# Patient Record
Sex: FEMALE | Race: WHITE | NOT HISPANIC OR LATINO | Employment: FULL TIME | URBAN - METROPOLITAN AREA
[De-identification: names, ages, dates, MRNs, and addresses within clinical notes are randomized per-mention and may not be internally consistent; named-entity substitution may affect disease eponyms.]

---

## 2019-04-15 ENCOUNTER — APPOINTMENT (OUTPATIENT)
Dept: PREADMISSION TESTING | Facility: HOSPITAL | Age: 59
End: 2019-04-15

## 2019-04-15 VITALS
BODY MASS INDEX: 34.98 KG/M2 | RESPIRATION RATE: 16 BRPM | OXYGEN SATURATION: 96 % | HEIGHT: 61 IN | TEMPERATURE: 97.8 F | WEIGHT: 185.25 LBS | HEART RATE: 74 BPM | SYSTOLIC BLOOD PRESSURE: 151 MMHG | DIASTOLIC BLOOD PRESSURE: 95 MMHG

## 2019-04-15 LAB
ALBUMIN SERPL-MCNC: 4.4 G/DL (ref 3.5–5.2)
ALBUMIN/GLOB SERPL: 1.6 G/DL
ALP SERPL-CCNC: 67 U/L (ref 39–117)
ALT SERPL W P-5'-P-CCNC: 11 U/L (ref 1–33)
ANION GAP SERPL CALCULATED.3IONS-SCNC: 13.1 MMOL/L
AST SERPL-CCNC: 10 U/L (ref 1–32)
BILIRUB SERPL-MCNC: 0.3 MG/DL (ref 0.2–1.2)
BUN BLD-MCNC: 11 MG/DL (ref 8–23)
BUN/CREAT SERPL: 15.5 (ref 7–25)
CALCIUM SPEC-SCNC: 9.4 MG/DL (ref 8.6–10.5)
CHLORIDE SERPL-SCNC: 102 MMOL/L (ref 98–107)
CO2 SERPL-SCNC: 27.9 MMOL/L (ref 22–29)
CREAT BLD-MCNC: 0.71 MG/DL (ref 0.57–1)
DEPRECATED RDW RBC AUTO: 38.9 FL (ref 37–54)
ERYTHROCYTE [DISTWIDTH] IN BLOOD BY AUTOMATED COUNT: 12.1 % (ref 12.3–15.4)
GFR SERPL CREATININE-BSD FRML MDRD: 84 ML/MIN/1.73
GLOBULIN UR ELPH-MCNC: 2.7 GM/DL
GLUCOSE BLD-MCNC: 110 MG/DL (ref 65–99)
HCT VFR BLD AUTO: 43.8 % (ref 34–46.6)
HGB BLD-MCNC: 14.7 G/DL (ref 12–15.9)
MCH RBC QN AUTO: 29.6 PG (ref 26.6–33)
MCHC RBC AUTO-ENTMCNC: 33.6 G/DL (ref 31.5–35.7)
MCV RBC AUTO: 88.1 FL (ref 79–97)
PLATELET # BLD AUTO: 195 10*3/MM3 (ref 140–450)
PMV BLD AUTO: 11.8 FL (ref 6–12)
POTASSIUM BLD-SCNC: 3.9 MMOL/L (ref 3.5–5.2)
PROT SERPL-MCNC: 7.1 G/DL (ref 6–8.5)
RBC # BLD AUTO: 4.97 10*6/MM3 (ref 3.77–5.28)
SODIUM BLD-SCNC: 143 MMOL/L (ref 136–145)
WBC NRBC COR # BLD: 7.91 10*3/MM3 (ref 3.4–10.8)

## 2019-04-15 PROCEDURE — 93010 ELECTROCARDIOGRAM REPORT: CPT | Performed by: INTERNAL MEDICINE

## 2019-04-15 PROCEDURE — 93005 ELECTROCARDIOGRAM TRACING: CPT

## 2019-04-15 PROCEDURE — 80053 COMPREHEN METABOLIC PANEL: CPT | Performed by: ORTHOPAEDIC SURGERY

## 2019-04-15 PROCEDURE — 85027 COMPLETE CBC AUTOMATED: CPT | Performed by: ORTHOPAEDIC SURGERY

## 2019-04-15 PROCEDURE — 36415 COLL VENOUS BLD VENIPUNCTURE: CPT

## 2019-04-15 RX ORDER — MELOXICAM 15 MG/1
15 TABLET ORAL EVERY MORNING
COMMUNITY

## 2019-04-15 RX ORDER — BENAZEPRIL HYDROCHLORIDE 40 MG/1
40 TABLET, FILM COATED ORAL EVERY MORNING
COMMUNITY

## 2019-04-15 RX ORDER — ACETAMINOPHEN 500 MG
500 TABLET ORAL EVERY 6 HOURS PRN
COMMUNITY

## 2019-04-15 NOTE — DISCHARGE INSTRUCTIONS
Take the following medications the morning of surgery with a small sip of water:    LOTENSIN    General Instructions:  • Do not eat solid food after midnight the night before surgery.  • You may drink clear liquids day of surgery but must stop at least one hour before your hospital arrival time.  • It is beneficial for you to have a clear drink that contains carbohydrates the day of surgery.  We suggest a 12 to 20 ounce bottle of Gatorade or Powerade for non-diabetic patients     Clear liquids are liquids you can see through.  Nothing red in color.     Plain water                               Sports drinks  Sodas                                   Gelatin (Jell-O)  Fruit juices without pulp such as white grape juice and apple juice  Popsicles that contain no fruit or yogurt  Tea or coffee (no cream or milk added)  Gatorade / Powerade  G2 / Powerade Zero    • Bring any papers given to you in the doctor’s office.  • Wear clean comfortable clothes and socks.  • Do not wear contact lenses, false eyelashes or make-up.  Bring a case for your glasses.   • Bring crutches or walker if applicable.  • Remove all piercings.  Leave jewelry and any other valuables at home.  • Hair extensions with metal clips must be removed prior to surgery.  • The Pre-Admission Testing nurse will instruct you to bring medications if unable to obtain an accurate list in Pre-Admission Testing.  • REPORT TO OUTPATIENT SURGERY ON 4-17-19          Preventing a Surgical Site Infection:  • For 2 to 3 days before surgery, avoid shaving with a razor because the razor can irritate skin and make it easier to develop an infection.    • Any areas of open skin can increase the risk of a post-operative wound infection by allowing bacteria to enter and travel throughout the body.  Notify your surgeon if you have any skin wounds / rashes even if it is not near the expected surgical site.  The area will need assessed to determine if surgery should be delayed  until it is healed.  • The night prior to surgery sleep in a clean bed with clean clothing.  Do not allow pets to sleep with you.  • Shower on the morning of surgery using a fresh bar of anti-bacterial soap (such as Dial) and clean washcloth.  Dry with a clean towel and dress in clean clothing.  • Ask your surgeon if you will be receiving antibiotics prior to surgery.  • Make sure you, your family, and all healthcare providers clean their hands with soap and water or an alcohol based hand  before caring for you or your wound.    Day of surgery:  Upon arrival, a Pre-op nurse and Anesthesiologist will review your health history, obtain vital signs, and answer questions you may have.  The only belongings needed at this time will be your home medications and if applicable your C-PAP/BI-PAP machine.  If you are staying overnight your family can leave the rest of your belongings in the car and bring them to your room later.  A Pre-op nurse will start an IV and you may receive medication in preparation for surgery, including something to help you relax.  Your family will be able to see you in the Pre-op area.  While you are in surgery your family should notify the waiting room  if they leave the waiting room area and provide a contact phone number.    Please be aware that surgery does come with discomfort.  We want to make every effort to control your discomfort so please discuss any uncontrolled symptoms with your nurse.   Your doctor will most likely have prescribed pain medications.      If you are going home after surgery you will receive individualized written care instructions before being discharged.  A responsible adult must drive you to and from the hospital on the day of your surgery and stay with you for 24 hours.        You have received a list of surgical assistants for your reference.  If you have any questions please call Pre-Admission Testing at [phone].  Deductibles and co-payments  are collected on the day of service. Please be prepared to pay the required co-pay, deductible or deposit on the day of service as defined by your plan.

## 2019-04-17 ENCOUNTER — APPOINTMENT (OUTPATIENT)
Dept: GENERAL RADIOLOGY | Facility: HOSPITAL | Age: 59
End: 2019-04-17

## 2019-04-17 ENCOUNTER — ANESTHESIA (OUTPATIENT)
Dept: PERIOP | Facility: HOSPITAL | Age: 59
End: 2019-04-17

## 2019-04-17 ENCOUNTER — ANESTHESIA EVENT (OUTPATIENT)
Dept: PERIOP | Facility: HOSPITAL | Age: 59
End: 2019-04-17

## 2019-04-17 ENCOUNTER — HOSPITAL ENCOUNTER (OUTPATIENT)
Facility: HOSPITAL | Age: 59
Setting detail: HOSPITAL OUTPATIENT SURGERY
Discharge: HOME | End: 2019-04-17
Attending: ORTHOPAEDIC SURGERY | Admitting: ORTHOPAEDIC SURGERY

## 2019-04-17 VITALS
RESPIRATION RATE: 16 BRPM | DIASTOLIC BLOOD PRESSURE: 75 MMHG | TEMPERATURE: 98 F | OXYGEN SATURATION: 94 % | HEART RATE: 81 BPM | SYSTOLIC BLOOD PRESSURE: 114 MMHG

## 2019-04-17 PROCEDURE — 25010000002 DEXAMETHASONE PER 1 MG: Performed by: NURSE ANESTHETIST, CERTIFIED REGISTERED

## 2019-04-17 PROCEDURE — 25010000002 FENTANYL CITRATE (PF) 100 MCG/2ML SOLUTION: Performed by: NURSE ANESTHETIST, CERTIFIED REGISTERED

## 2019-04-17 PROCEDURE — 76000 FLUOROSCOPY <1 HR PHYS/QHP: CPT

## 2019-04-17 PROCEDURE — 25010000002 ONDANSETRON PER 1 MG: Performed by: NURSE ANESTHETIST, CERTIFIED REGISTERED

## 2019-04-17 PROCEDURE — 25010000002 PROPOFOL 10 MG/ML EMULSION: Performed by: NURSE ANESTHETIST, CERTIFIED REGISTERED

## 2019-04-17 PROCEDURE — C1713 ANCHOR/SCREW BN/BN,TIS/BN: HCPCS | Performed by: ORTHOPAEDIC SURGERY

## 2019-04-17 PROCEDURE — 25010000002 MIDAZOLAM PER 1 MG: Performed by: ANESTHESIOLOGY

## 2019-04-17 PROCEDURE — 25010000002 FENTANYL CITRATE (PF) 100 MCG/2ML SOLUTION: Performed by: ANESTHESIOLOGY

## 2019-04-17 PROCEDURE — 73560 X-RAY EXAM OF KNEE 1 OR 2: CPT

## 2019-04-17 PROCEDURE — 25010000002 FENTANYL CITRATE (PF) 100 MCG/2ML SOLUTION

## 2019-04-17 PROCEDURE — 25010000003 CEFAZOLIN IN DEXTROSE 2-4 GM/100ML-% SOLUTION: Performed by: ORTHOPAEDIC SURGERY

## 2019-04-17 PROCEDURE — 25010000002 KETOROLAC TROMETHAMINE PER 15 MG: Performed by: NURSE ANESTHETIST, CERTIFIED REGISTERED

## 2019-04-17 DEVICE — CMT BONE 5CC: Type: IMPLANTABLE DEVICE | Site: KNEE | Status: FUNCTIONAL

## 2019-04-17 RX ORDER — PROMETHAZINE HYDROCHLORIDE 25 MG/ML
6.25 INJECTION, SOLUTION INTRAMUSCULAR; INTRAVENOUS ONCE AS NEEDED
Status: DISCONTINUED | OUTPATIENT
Start: 2019-04-17 | End: 2019-04-17 | Stop reason: HOSPADM

## 2019-04-17 RX ORDER — EPHEDRINE SULFATE 50 MG/ML
5 INJECTION, SOLUTION INTRAVENOUS ONCE AS NEEDED
Status: DISCONTINUED | OUTPATIENT
Start: 2019-04-17 | End: 2019-04-17 | Stop reason: HOSPADM

## 2019-04-17 RX ORDER — KETOROLAC TROMETHAMINE 30 MG/ML
INJECTION, SOLUTION INTRAMUSCULAR; INTRAVENOUS AS NEEDED
Status: DISCONTINUED | OUTPATIENT
Start: 2019-04-17 | End: 2019-04-17 | Stop reason: SURG

## 2019-04-17 RX ORDER — PROMETHAZINE HYDROCHLORIDE 25 MG/1
25 SUPPOSITORY RECTAL ONCE AS NEEDED
Status: DISCONTINUED | OUTPATIENT
Start: 2019-04-17 | End: 2019-04-17 | Stop reason: HOSPADM

## 2019-04-17 RX ORDER — FENTANYL CITRATE 50 UG/ML
INJECTION, SOLUTION INTRAMUSCULAR; INTRAVENOUS
Status: COMPLETED
Start: 2019-04-17 | End: 2019-04-17

## 2019-04-17 RX ORDER — HYDROCODONE BITARTRATE AND ACETAMINOPHEN 7.5; 325 MG/1; MG/1
TABLET ORAL
Status: COMPLETED
Start: 2019-04-17 | End: 2019-04-17

## 2019-04-17 RX ORDER — DEXAMETHASONE SODIUM PHOSPHATE 10 MG/ML
INJECTION INTRAMUSCULAR; INTRAVENOUS AS NEEDED
Status: DISCONTINUED | OUTPATIENT
Start: 2019-04-17 | End: 2019-04-17 | Stop reason: SURG

## 2019-04-17 RX ORDER — PROMETHAZINE HYDROCHLORIDE 25 MG/1
25 TABLET ORAL ONCE AS NEEDED
Status: DISCONTINUED | OUTPATIENT
Start: 2019-04-17 | End: 2019-04-17 | Stop reason: HOSPADM

## 2019-04-17 RX ORDER — SODIUM CHLORIDE 0.9 % (FLUSH) 0.9 %
1-10 SYRINGE (ML) INJECTION AS NEEDED
Status: DISCONTINUED | OUTPATIENT
Start: 2019-04-17 | End: 2019-04-17 | Stop reason: HOSPADM

## 2019-04-17 RX ORDER — PROPOFOL 10 MG/ML
VIAL (ML) INTRAVENOUS AS NEEDED
Status: DISCONTINUED | OUTPATIENT
Start: 2019-04-17 | End: 2019-04-17 | Stop reason: SURG

## 2019-04-17 RX ORDER — SODIUM CHLORIDE, SODIUM LACTATE, POTASSIUM CHLORIDE, CALCIUM CHLORIDE 600; 310; 30; 20 MG/100ML; MG/100ML; MG/100ML; MG/100ML
9 INJECTION, SOLUTION INTRAVENOUS CONTINUOUS
Status: DISCONTINUED | OUTPATIENT
Start: 2019-04-17 | End: 2019-04-17 | Stop reason: HOSPADM

## 2019-04-17 RX ORDER — LIDOCAINE HYDROCHLORIDE 10 MG/ML
0.5 INJECTION, SOLUTION EPIDURAL; INFILTRATION; INTRACAUDAL; PERINEURAL ONCE AS NEEDED
Status: COMPLETED | OUTPATIENT
Start: 2019-04-17 | End: 2019-04-17

## 2019-04-17 RX ORDER — LIDOCAINE HYDROCHLORIDE 20 MG/ML
INJECTION, SOLUTION INFILTRATION; PERINEURAL AS NEEDED
Status: DISCONTINUED | OUTPATIENT
Start: 2019-04-17 | End: 2019-04-17 | Stop reason: SURG

## 2019-04-17 RX ORDER — SODIUM CHLORIDE, SODIUM LACTATE, POTASSIUM CHLORIDE, AND CALCIUM CHLORIDE .6; .31; .03; .02 G/100ML; G/100ML; G/100ML; G/100ML
IRRIGANT IRRIGATION AS NEEDED
Status: DISCONTINUED | OUTPATIENT
Start: 2019-04-17 | End: 2019-04-17 | Stop reason: HOSPADM

## 2019-04-17 RX ORDER — HYDROMORPHONE HYDROCHLORIDE 1 MG/ML
0.5 INJECTION, SOLUTION INTRAMUSCULAR; INTRAVENOUS; SUBCUTANEOUS
Status: DISCONTINUED | OUTPATIENT
Start: 2019-04-17 | End: 2019-04-17 | Stop reason: HOSPADM

## 2019-04-17 RX ORDER — ONDANSETRON 2 MG/ML
INJECTION INTRAMUSCULAR; INTRAVENOUS AS NEEDED
Status: DISCONTINUED | OUTPATIENT
Start: 2019-04-17 | End: 2019-04-17 | Stop reason: SURG

## 2019-04-17 RX ORDER — FAMOTIDINE 10 MG/ML
20 INJECTION, SOLUTION INTRAVENOUS ONCE
Status: COMPLETED | OUTPATIENT
Start: 2019-04-17 | End: 2019-04-17

## 2019-04-17 RX ORDER — FENTANYL CITRATE 50 UG/ML
100 INJECTION, SOLUTION INTRAMUSCULAR; INTRAVENOUS
Status: DISCONTINUED | OUTPATIENT
Start: 2019-04-17 | End: 2019-04-17 | Stop reason: HOSPADM

## 2019-04-17 RX ORDER — SODIUM CHLORIDE 0.9 % (FLUSH) 0.9 %
3 SYRINGE (ML) INJECTION EVERY 12 HOURS SCHEDULED
Status: DISCONTINUED | OUTPATIENT
Start: 2019-04-17 | End: 2019-04-17 | Stop reason: HOSPADM

## 2019-04-17 RX ORDER — ONDANSETRON 2 MG/ML
4 INJECTION INTRAMUSCULAR; INTRAVENOUS ONCE AS NEEDED
Status: DISCONTINUED | OUTPATIENT
Start: 2019-04-17 | End: 2019-04-17 | Stop reason: HOSPADM

## 2019-04-17 RX ORDER — FENTANYL CITRATE 50 UG/ML
INJECTION, SOLUTION INTRAMUSCULAR; INTRAVENOUS AS NEEDED
Status: DISCONTINUED | OUTPATIENT
Start: 2019-04-17 | End: 2019-04-17 | Stop reason: SURG

## 2019-04-17 RX ORDER — MIDAZOLAM HYDROCHLORIDE 1 MG/ML
2 INJECTION INTRAMUSCULAR; INTRAVENOUS
Status: DISCONTINUED | OUTPATIENT
Start: 2019-04-17 | End: 2019-04-17 | Stop reason: HOSPADM

## 2019-04-17 RX ORDER — HYDROCODONE BITARTRATE AND ACETAMINOPHEN 7.5; 325 MG/1; MG/1
1 TABLET ORAL ONCE AS NEEDED
Status: COMPLETED | OUTPATIENT
Start: 2019-04-17 | End: 2019-04-17

## 2019-04-17 RX ORDER — OXYCODONE AND ACETAMINOPHEN 7.5; 325 MG/1; MG/1
1 TABLET ORAL EVERY 4 HOURS PRN
Status: DISCONTINUED | OUTPATIENT
Start: 2019-04-17 | End: 2019-04-17 | Stop reason: HOSPADM

## 2019-04-17 RX ORDER — FLUMAZENIL 0.1 MG/ML
0.2 INJECTION INTRAVENOUS AS NEEDED
Status: DISCONTINUED | OUTPATIENT
Start: 2019-04-17 | End: 2019-04-17 | Stop reason: HOSPADM

## 2019-04-17 RX ORDER — CEFAZOLIN SODIUM 2 G/100ML
2 INJECTION, SOLUTION INTRAVENOUS ONCE
Status: COMPLETED | OUTPATIENT
Start: 2019-04-17 | End: 2019-04-17

## 2019-04-17 RX ORDER — MIDAZOLAM HYDROCHLORIDE 1 MG/ML
1 INJECTION INTRAMUSCULAR; INTRAVENOUS
Status: DISCONTINUED | OUTPATIENT
Start: 2019-04-17 | End: 2019-04-17 | Stop reason: HOSPADM

## 2019-04-17 RX ORDER — EPHEDRINE SULFATE 50 MG/ML
INJECTION, SOLUTION INTRAVENOUS AS NEEDED
Status: DISCONTINUED | OUTPATIENT
Start: 2019-04-17 | End: 2019-04-17 | Stop reason: SURG

## 2019-04-17 RX ORDER — FENTANYL CITRATE 50 UG/ML
50 INJECTION, SOLUTION INTRAMUSCULAR; INTRAVENOUS
Status: DISCONTINUED | OUTPATIENT
Start: 2019-04-17 | End: 2019-04-17 | Stop reason: HOSPADM

## 2019-04-17 RX ORDER — HYDROCODONE BITARTRATE AND ACETAMINOPHEN 5; 325 MG/1; MG/1
1-2 TABLET ORAL EVERY 4 HOURS PRN
Qty: 40 TABLET | Refills: 0 | Status: SHIPPED | OUTPATIENT
Start: 2019-04-17 | End: 2019-09-24

## 2019-04-17 RX ORDER — BUPIVACAINE HYDROCHLORIDE AND EPINEPHRINE 5; 5 MG/ML; UG/ML
INJECTION, SOLUTION PERINEURAL AS NEEDED
Status: DISCONTINUED | OUTPATIENT
Start: 2019-04-17 | End: 2019-04-17 | Stop reason: HOSPADM

## 2019-04-17 RX ADMIN — FENTANYL CITRATE 50 MCG: 50 INJECTION, SOLUTION INTRAMUSCULAR; INTRAVENOUS at 09:36

## 2019-04-17 RX ADMIN — LIDOCAINE HYDROCHLORIDE 0.5 ML: 10 INJECTION, SOLUTION EPIDURAL; INFILTRATION; INTRACAUDAL; PERINEURAL at 07:16

## 2019-04-17 RX ADMIN — KETOROLAC TROMETHAMINE 30 MG: 30 INJECTION, SOLUTION INTRAMUSCULAR; INTRAVENOUS at 08:40

## 2019-04-17 RX ADMIN — PROPOFOL 200 MG: 10 INJECTION, EMULSION INTRAVENOUS at 08:02

## 2019-04-17 RX ADMIN — SODIUM CHLORIDE, POTASSIUM CHLORIDE, SODIUM LACTATE AND CALCIUM CHLORIDE 9 ML/HR: 600; 310; 30; 20 INJECTION, SOLUTION INTRAVENOUS at 07:16

## 2019-04-17 RX ADMIN — MIDAZOLAM 2 MG: 1 INJECTION INTRAMUSCULAR; INTRAVENOUS at 07:29

## 2019-04-17 RX ADMIN — FAMOTIDINE 20 MG: 10 INJECTION INTRAVENOUS at 07:28

## 2019-04-17 RX ADMIN — FENTANYL CITRATE 50 MCG: 50 INJECTION, SOLUTION INTRAMUSCULAR; INTRAVENOUS at 09:31

## 2019-04-17 RX ADMIN — ONDANSETRON 4 MG: 2 INJECTION INTRAMUSCULAR; INTRAVENOUS at 08:40

## 2019-04-17 RX ADMIN — HYDROCODONE BITARTRATE AND ACETAMINOPHEN 1 TABLET: 7.5; 325 TABLET ORAL at 09:32

## 2019-04-17 RX ADMIN — FENTANYL CITRATE 50 MCG: 50 INJECTION INTRAMUSCULAR; INTRAVENOUS at 08:01

## 2019-04-17 RX ADMIN — DEXAMETHASONE SODIUM PHOSPHATE 8 MG: 10 INJECTION INTRAMUSCULAR; INTRAVENOUS at 08:10

## 2019-04-17 RX ADMIN — LIDOCAINE HYDROCHLORIDE 100 MG: 20 INJECTION, SOLUTION INFILTRATION; PERINEURAL at 08:02

## 2019-04-17 RX ADMIN — CEFAZOLIN SODIUM 2 G: 2 INJECTION, SOLUTION INTRAVENOUS at 08:10

## 2019-04-17 RX ADMIN — EPHEDRINE SULFATE 10 MG: 50 INJECTION INTRAMUSCULAR; INTRAVENOUS; SUBCUTANEOUS at 08:22

## 2019-04-17 NOTE — OP NOTE
KNEE ARTHROSCOPY  Procedure Note    Moriah Weeks  4/17/2019    Pre-op Diagnosis:   1.  Left medial and lateral meniscal tears  2.  Insufficiency fracture of left medial tibial plateau  3.     Post-op Diagnosis:     1.  Same  2.   3.     Procedure(s):  1.  Left knee arthroscopy with partial medial and lateral meniscectomy  2.  Arthroscopic chondroplasty  3.  Internal fixation of insufficiency fracture of medial tibial plateau  4.     Surgeon(s):  Aaron Fleming MD    Anesthesia: General  Anesthesiologist: Jeremiah Valles MD  CRNA: Jeana Connor CRNA    Staff:   Circulator: Beth Dupree RN; Tressa Degroot RN  Radiology Technologist: Mattingly-Epley, Alyna D.  Scrub Person: Essence Davis  Vendor Representative: Steve Chambers      Drains: None    Estimated Blood Loss: minimal    Specimen: * No orders in the log *    Description of Procedure:  I.  Following induction of anesthesia a tourniquet was placed on the left upper thigh and the leg was placed in the leg collado.  The leg was then prepped and draped in a sterile fashion.  I wrapped the leg with the elastic bandage and inflated the tourniquet to 250 mmHg pressure.  We first proceed with internal fixation of the fracture.  Using the C arm in the AP and lateral plane I located a position on the skin just overlying the medial tibial plateau.  I made a small stab incision and placed the trocar into the plateau around 2 cm distal to the joint line.  I then injected a total of 3 cc of bone cement  rotating the trocar 90 degrees between each of the 3 injections.  C-arm was used to confirm the position of the cement.  There was no extravasation.    II.  I then created the lateral arthroscopic portal and inserted the cannula into the joint.  She was found to have a very fibrotic medial plical shelf and chondromalacia of the patella.  I created the medial portal and inserted the shaver and performed extensive debridement of the plica as well as  a chondroplasty of the patella and femur.  She was also noted to have significant tearing of both the medial and lateral meniscus.  I used a small incising forcep to morselized unstable inner margin of the medial meniscus then used a shaver to vacuum out these fragments were further contoured back to stable tissue.  I then used the same biter to morselized unstable inner margin of the lateral meniscus and used a shaver to vacuum out these fragments and further contoured back to stable tissue.  We then flushed the knee and removed our cannulas and closed the portals with a 3-0 nylon.  I then injected the knee with a solution of Marcaine and morphine.  She was then placed into a soft sterile dressing.  She will be discharged home following recovery and prognosis is good.    Findings: See Dictation    Complications: None      Aaron Fleming MD     Date: 4/17/2019  Time: 8:48 AM

## 2019-04-17 NOTE — ANESTHESIA POSTPROCEDURE EVALUATION
Patient: Moriah Wekes    Procedure Summary     Date:  04/17/19 Room / Location:  [facility]  /  [facility]    Anesthesia Start:  0759 Anesthesia Stop:  0859    Procedure:  LEFT KNEE ARTHROSCOPY WITH PARTIAL MEDIAL MENISCETOMY, CHONDROPLASTY AND INTERNAL FIXATION TIBIAL PLATEAU INSUFFICIENCY FRACTURE (Left Knee) Diagnosis:      Surgeon:  Aaron Fleming MD Provider:  Jeremiah Valles MD    Anesthesia Type:  general ASA Status:  3          Anesthesia Type: general  Last vitals  BP   106/75 (04/17/19 1005)   Temp   36.7 °C (98 °F) (04/17/19 1005)   Pulse   88 (04/17/19 1005)   Resp   16 (04/17/19 1005)     SpO2   98 % (04/17/19 1005)     Post Anesthesia Care and Evaluation    Patient location during evaluation: bedside  Patient participation: complete - patient participated  Level of consciousness: awake  Pain score: 1  Pain management: adequate  Airway patency: patent  Anesthetic complications: No anesthetic complications    Cardiovascular status: acceptable  Respiratory status: acceptable  Hydration status: acceptable    Comments: -------------------------              04/17/19                    1005        -------------------------   BP:         106/75        Pulse:        88          Resp:         16          Temp:   36.7 °C (98 °F)   SpO2:         98%        -------------------------

## 2019-04-17 NOTE — ANESTHESIA PREPROCEDURE EVALUATION
Anesthesia Evaluation     Patient summary reviewed and Nursing notes reviewed   NPO Solid Status: > 8 hours             Airway   Mallampati: II  TM distance: >3 FB  Neck ROM: full  no difficulty expected  Dental - normal exam     Pulmonary - normal exam   (+) sleep apnea,   Cardiovascular - normal exam    (+) hypertension,       Neuro/Psych  (+) psychiatric history Depression,     GI/Hepatic/Renal/Endo - negative ROS     Musculoskeletal (-) negative ROS    Abdominal  - normal exam   Substance History - negative use     OB/GYN negative ob/gyn ROS         Other                        Anesthesia Plan    ASA 3     general     intravenous induction   Anesthetic plan, all risks, benefits, and alternatives have been provided, discussed and informed consent has been obtained with: patient.    Plan discussed with CRNA.

## 2019-04-17 NOTE — ANESTHESIA PROCEDURE NOTES
Airway  Urgency: elective    Airway not difficult    General Information and Staff    Patient location during procedure: OR  Anesthesiologist: Jeremiah Valles MD  CRNA: Jeana Connor CRNA    Indications and Patient Condition  Indications for airway management: airway protection    Preoxygenated: yes (Pt pre-O2 with 100% O2)  Mask difficulty assessment: 0 - not attempted    Final Airway Details  Final airway type: supraglottic airway      Successful airway: classic  Size 4    Number of attempts at approach: 1    Additional Comments  Appears ATOLMA x1. No change in dentition. + ETCO2. Airway seal pressure <20cm H2O.

## 2019-09-24 ENCOUNTER — APPOINTMENT (OUTPATIENT)
Dept: PREADMISSION TESTING | Facility: HOSPITAL | Age: 59
End: 2019-09-24

## 2019-09-24 ENCOUNTER — HOSPITAL ENCOUNTER (OUTPATIENT)
Dept: GENERAL RADIOLOGY | Facility: HOSPITAL | Age: 59
Discharge: HOME | End: 2019-09-24

## 2019-09-24 ENCOUNTER — HOSPITAL ENCOUNTER (OUTPATIENT)
Dept: GENERAL RADIOLOGY | Facility: HOSPITAL | Age: 59
Discharge: HOME | End: 2019-09-24
Admitting: ORTHOPAEDIC SURGERY

## 2019-09-24 ENCOUNTER — TRANSCRIBE ORDERS (OUTPATIENT)
Dept: ADMINISTRATIVE | Facility: HOSPITAL | Age: 59
End: 2019-09-24

## 2019-09-24 ENCOUNTER — LAB (OUTPATIENT)
Dept: LAB | Facility: HOSPITAL | Age: 59
End: 2019-09-24

## 2019-09-24 VITALS
HEART RATE: 59 BPM | RESPIRATION RATE: 20 BRPM | DIASTOLIC BLOOD PRESSURE: 89 MMHG | BODY MASS INDEX: 35.95 KG/M2 | OXYGEN SATURATION: 96 % | HEIGHT: 61 IN | SYSTOLIC BLOOD PRESSURE: 132 MMHG | WEIGHT: 190.4 LBS | TEMPERATURE: 98.1 F

## 2019-09-24 DIAGNOSIS — Z00.00 ROUTINE GENERAL MEDICAL EXAMINATION AT A HEALTH CARE FACILITY: ICD-10-CM

## 2019-09-24 DIAGNOSIS — Z00.00 ROUTINE GENERAL MEDICAL EXAMINATION AT A HEALTH CARE FACILITY: Primary | ICD-10-CM

## 2019-09-24 LAB
ALBUMIN SERPL-MCNC: 4.7 G/DL (ref 3.5–5.2)
ALBUMIN/GLOB SERPL: 1.6 G/DL
ALP SERPL-CCNC: 71 U/L (ref 39–117)
ALT SERPL W P-5'-P-CCNC: 12 U/L (ref 1–33)
ANION GAP SERPL CALCULATED.3IONS-SCNC: 11.5 MMOL/L (ref 5–15)
APTT PPP: 29.5 SECONDS (ref 22.7–35.4)
AST SERPL-CCNC: 13 U/L (ref 1–32)
BACTERIA UR QL AUTO: ABNORMAL /HPF
BASOPHILS # BLD AUTO: 0.04 10*3/MM3 (ref 0–0.2)
BASOPHILS NFR BLD AUTO: 0.4 % (ref 0–1.5)
BILIRUB SERPL-MCNC: 0.4 MG/DL (ref 0.2–1.2)
BILIRUB UR QL STRIP: NEGATIVE
BUN BLD-MCNC: 11 MG/DL (ref 8–23)
BUN/CREAT SERPL: 17.2 (ref 7–25)
CALCIUM SPEC-SCNC: 9.8 MG/DL (ref 8.6–10.5)
CHLORIDE SERPL-SCNC: 101 MMOL/L (ref 98–107)
CHOLEST SERPL-MCNC: 182 MG/DL (ref 0–200)
CLARITY UR: ABNORMAL
CO2 SERPL-SCNC: 31.5 MMOL/L (ref 22–29)
COLOR UR: YELLOW
CREAT BLD-MCNC: 0.64 MG/DL (ref 0.57–1)
DEPRECATED RDW RBC AUTO: 40.5 FL (ref 37–54)
EOSINOPHIL # BLD AUTO: 0.11 10*3/MM3 (ref 0–0.4)
EOSINOPHIL NFR BLD AUTO: 1.2 % (ref 0.3–6.2)
ERYTHROCYTE [DISTWIDTH] IN BLOOD BY AUTOMATED COUNT: 12.8 % (ref 12.3–15.4)
GFR SERPL CREATININE-BSD FRML MDRD: 95 ML/MIN/1.73
GLOBULIN UR ELPH-MCNC: 2.9 GM/DL
GLUCOSE BLD-MCNC: 99 MG/DL (ref 65–99)
GLUCOSE UR STRIP-MCNC: NEGATIVE MG/DL
HCT VFR BLD AUTO: 46.1 % (ref 34–46.6)
HDLC SERPL-MCNC: 71 MG/DL (ref 40–60)
HGB BLD-MCNC: 15.5 G/DL (ref 12–15.9)
HGB UR QL STRIP.AUTO: NEGATIVE
HYALINE CASTS UR QL AUTO: ABNORMAL /LPF
IMM GRANULOCYTES # BLD AUTO: 0.05 10*3/MM3 (ref 0–0.05)
IMM GRANULOCYTES NFR BLD AUTO: 0.5 % (ref 0–0.5)
INR PPP: 1.12 (ref 0.9–1.1)
KETONES UR QL STRIP: NEGATIVE
LDLC SERPL CALC-MCNC: 90 MG/DL (ref 0–100)
LDLC/HDLC SERPL: 1.27 {RATIO}
LEUKOCYTE ESTERASE UR QL STRIP.AUTO: ABNORMAL
LYMPHOCYTES # BLD AUTO: 1.87 10*3/MM3 (ref 0.7–3.1)
LYMPHOCYTES NFR BLD AUTO: 19.9 % (ref 19.6–45.3)
MCH RBC QN AUTO: 29.4 PG (ref 26.6–33)
MCHC RBC AUTO-ENTMCNC: 33.6 G/DL (ref 31.5–35.7)
MCV RBC AUTO: 87.3 FL (ref 79–97)
MONOCYTES # BLD AUTO: 0.4 10*3/MM3 (ref 0.1–0.9)
MONOCYTES NFR BLD AUTO: 4.3 % (ref 5–12)
NEUTROPHILS # BLD AUTO: 6.92 10*3/MM3 (ref 1.7–7)
NEUTROPHILS NFR BLD AUTO: 73.7 % (ref 42.7–76)
NITRITE UR QL STRIP: NEGATIVE
NRBC BLD AUTO-RTO: 0 /100 WBC (ref 0–0.2)
PH UR STRIP.AUTO: <=5 [PH] (ref 5–8)
PLATELET # BLD AUTO: 247 10*3/MM3 (ref 140–450)
PMV BLD AUTO: 11.7 FL (ref 6–12)
POTASSIUM BLD-SCNC: 4.1 MMOL/L (ref 3.5–5.2)
PROT SERPL-MCNC: 7.6 G/DL (ref 6–8.5)
PROT UR QL STRIP: NEGATIVE
PROTHROMBIN TIME: 14.1 SECONDS (ref 11.7–14.2)
RBC # BLD AUTO: 5.28 10*6/MM3 (ref 3.77–5.28)
RBC # UR: ABNORMAL /HPF
REF LAB TEST METHOD: ABNORMAL
SODIUM BLD-SCNC: 144 MMOL/L (ref 136–145)
SP GR UR STRIP: 1.01 (ref 1–1.03)
SQUAMOUS #/AREA URNS HPF: ABNORMAL /HPF
TRIGL SERPL-MCNC: 103 MG/DL (ref 0–150)
TSH SERPL DL<=0.05 MIU/L-ACNC: 1.11 UIU/ML (ref 0.27–4.2)
UROBILINOGEN UR QL STRIP: ABNORMAL
VLDLC SERPL-MCNC: 20.6 MG/DL (ref 5–40)
WBC NRBC COR # BLD: 9.39 10*3/MM3 (ref 3.4–10.8)
WBC UR QL AUTO: ABNORMAL /HPF

## 2019-09-24 PROCEDURE — 73560 X-RAY EXAM OF KNEE 1 OR 2: CPT

## 2019-09-24 PROCEDURE — 71046 X-RAY EXAM CHEST 2 VIEWS: CPT

## 2019-09-24 PROCEDURE — 93010 ELECTROCARDIOGRAM REPORT: CPT | Performed by: INTERNAL MEDICINE

## 2019-09-24 PROCEDURE — 80053 COMPREHEN METABOLIC PANEL: CPT | Performed by: ORTHOPAEDIC SURGERY

## 2019-09-24 PROCEDURE — 36415 COLL VENOUS BLD VENIPUNCTURE: CPT

## 2019-09-24 PROCEDURE — 85025 COMPLETE CBC W/AUTO DIFF WBC: CPT | Performed by: ORTHOPAEDIC SURGERY

## 2019-09-24 PROCEDURE — 85730 THROMBOPLASTIN TIME PARTIAL: CPT | Performed by: ORTHOPAEDIC SURGERY

## 2019-09-24 PROCEDURE — 81001 URINALYSIS AUTO W/SCOPE: CPT | Performed by: ORTHOPAEDIC SURGERY

## 2019-09-24 PROCEDURE — 93005 ELECTROCARDIOGRAM TRACING: CPT

## 2019-09-24 PROCEDURE — 80061 LIPID PANEL: CPT

## 2019-09-24 PROCEDURE — 85610 PROTHROMBIN TIME: CPT | Performed by: ORTHOPAEDIC SURGERY

## 2019-09-24 PROCEDURE — 84443 ASSAY THYROID STIM HORMONE: CPT

## 2019-09-24 RX ORDER — CHLORHEXIDINE GLUCONATE 500 MG/1
CLOTH TOPICAL
Status: ON HOLD | COMMUNITY
End: 2019-10-11

## 2019-09-24 ASSESSMENT — KOOS JR
KOOS JR SCORE: 61.583
KOOS JR SCORE: 10

## 2019-10-07 NOTE — PROGRESS NOTES
Pre Op Ortho Assessment    [facility]     Patient Name: Moriah Weeks  MRN: [id number]  Today's Date: 10/7/2019        PRE-OPERATIVE ORTHOPEDIC ASSESSMENT     Row Name 10/07/19 1644       Question    What is your age group?  2    Gender?  1    How far on average can you walk? (a block is 200 meters)  1    Which gait aid do you use? (more often than not)  2    Do you use community supports: (home help, meals on wheels, district nursing)  1    Will you live with someone who can care for you after your operation?  3    Your Score (out of 12)  10       Discharge Disposition/Planning:    Discussed the predicted discharge disposition needed based on RAPT Assessment with the patient  Yes    Patient Selected Discharge Disposition:  Home 1 STEPS INTO THE HOUSE W/O RIAL AND NONE INSIDE    Outpatient Rehabilitation Facility of Choice:  other *see comment NO CHOICE YET    Home Health Services Preferred:  Other *see comment [facility]    Post-operative Caregiver Name and Phone Number  Spouse david - [phone]       Home Equipment    Does patient have a walker for home use?  Yes    Does patient have a 3 in 1 commode for home use?  No    Does patient have a shower chair for home use?  Yes    Does patient have an elevated commode seat for home use?  No    Does patient have a cane for home use?  Yes    Is there any other medical equipment in the home?  No       Pre-Operative Class Attendance    Attended or scheduled to attend the pre-operative class within 1 year of total joint replacement?  Yes       Patient Education    Expected time of discharge discussed  Yes    Encouraged to attend pre-operative class  Yes    Education regarding predicted discharge disposition based on RAPT score  Yes    Patient receptive and voiced understanding of information given  Yes            Gibran Mcfarland LPN

## 2019-10-08 NOTE — H&P
TRIP MCKEON is a Pleasant 59-year-old female who comes into the office today for evaluation and management of her left knee pain.  The patient's history is significant for progressively worsening pain in the left knee for the past 6 months.  Initially she presented to see Dr. Aaron Fleming in our office with onset of pain in the left knee which was worse with kneeling walking taking stairs and turning over in bed.  She was evaluated with an MRI.  Initially she was treated with intra-articular injections which did not give her any significant relief.  Subsequently she underwent a left knee arthroscopic surgery with Dr. Fleming for insufficiency fracture of the left medial tibial plateau and also arthroscopic partial medial and lateral meniscectomy.  This was on April 17, 2019.  Subsequently the patient continued to have increasing pain.  She had a repeat MRI.  She presents to the office today for options for possible knee replacement surgery.  She did receive further intra-articular injections after the arthroscopic surgery with Kenalog but these are not helping.  She is accompanied by her family member to the office visit.  She does mostly desk job.  She has difficulty with activities of daily living.  She complains of stiffness and limping.  She has difficulty squatting, walking distances.  She never had any injury to the knee.  She denies any groin pain.  She has also tried meloxicam this is not helping.  She denies any  MRSA, DVT, cardiac problems.  She does have a history of depression, sleep apnea.  Review of Systems:  Positive for: Decreased Motion, Depression and Joint Pain.    Patient denies: Abdominal Pain, Bleeding, Chest Pain, Convulsions/Seizure, Difficulty Swallowing, Easy Bruisability, Emotional Disturbances, Eyes or Vision Problems, Fecal Incontinence, Fever/Chills, Headaches, Increased Thirst, Increased Hunger, Insomnia, Nausea/Vomiting, Night Sweats, Poor Balance, Persistent Cough, Rash,  Shortness of Breath, Shortness of Breath While Lying down, Skin Problems, Urinary Retention and Weakness.  Allergies:  * no known allergies  Medications:  norco 5-325 mg oral tablet (hydrocodone-acetaminophen) 1-2 tid  norco 5-325 mg oral tablet (hydrocodone-acetaminophen) 1-2 tid  citalopram hydrobromide tablet (citalopram hydrobromide tabs)   Patient History of:  DEPRESSION  SLEEP APNEA/CPAP/BIPAP  BLOOD CLOTS/EMBOLISM - NEGATIVE  HYPERTENSION  Surgical History:  Hysterectomy-Total-[[id number]]   Known Family History of:  Negative  Social History:  Social history taken on 08/21/2019 states LUTHER CLARK is a  59 year old female.  She has never used tobacco products.      Past medical, social, family histories and ROS reviewed today with the patient and changes documented in the chart (08/21/2019).    Previous doctor's notes and surgery reports were reviewed.    Physical Exam  Height:  61 in.    Weight:  188 lbs.     BMI:  35.65  Pulse:  74  Blood pressure:  120 / 78 mm Hg    Gait: antalgic                     Mental/HEENT/Cardio/Skin  The patient's general appearance is well developed, well nourished, no acute distress.  Orientation is alert and oriented x 3.  The patient's mood is normal.  A head exam reveals normocephalic/atraumatic.  An eye exam reveals pupils equal.  Pulmonary exam shows normal air exchange, no labored breathing, or shortness of breath.  A skin exam shows normal temperature and color in the area of examination.      Right Knee      Left Knee  Skin is normal.  There is quad atrophy.  Effusion is 1+.  No warmth.  No erythema.  Normal sensation.  Capillary refill is normal.  Reflexes are normal.  Range of motion of the knee is 5 to <120 degrees of flexion.  There is moderate tenderness in the medial patella femoral.  Moderate patellar crepitation.  The leg lengths are equal.  Pes planus is negative.  PTTI is negative.        Imaging/Diagnostic Studies  X-rays of the Left Knee [standing  AP;Lateral;[place];PA] were ordered and reviewed today.      X-rays of the left kneeX-rays show there is severe narrowing of the medial joint spaces.  bone on bone arthrosis.  Medial spurring.  Alignment is 0-5 degrees varus.    Left Knee MRI: 08/13/2019  Facility: [facility]  Radiologist report states: Medial compartment osteoarthritis appears progressive when compared to the prior exam 03/26/2019.  There is increased reactive marrow edema and stress reaction within the medial femoral condyle with up to full-thickness articular cartilage loss weightbearing surface.  Persistent  though improved marrow edema/stress reaction medial tibial plateau post subchondroplasty.  Radial tear/root avulsion posterior root medial meniscus without change.  Joint effusion with Baker's cyst and extracapsular extension of fluid along the posteromedial knee.       Impression  Left knee primary osteoarthritis    Plan  Options and alternatives were discussed in detail with the patient.  Repeat MRI for long leg alignment.  The patient has reached a point of disability and has failed nonoperative management.  The patient is indicated for a LEFT  total knee arthroplasty.  Likely,  Risks and benefits of the procedure including but not limited to infection, DVT, pulmonary embolism, future loosening of the implants, possibility of injury to nerves vessels and tendons, periprosthetic fractures have been discussed in detail.  Despite the risks involved,  The patient would like to proceed.  The patient  is being scheduled for a LEFT total knee arthroplasty at [facility] on Oct 11, 2019.   I will request for Medical and cardiac clearance from Dr. Eric Lemos MD.  Postoperative DVT prophylaxis - Patient has no high risk factors  Plan for Aspirin.   Preoperative antibiotic prophylaxis - Plan for SCIP protocol with Cephazolin weight based. Will give Vancomycin in addition due to history of recent surgeries.   Surgery  will be scheduled for  inpatient status due to medical comorbidities.     We discussed the benefits of surgical intervention, as well as alternative treatments.  Potential surgical risks and complications include but are not limited to DVT, infection, neurovascular injury, fracture, implant wear, failure, possible need for revision surgery, loss of motion, dislocation, limb length changes.  Sufficient opportunity was given to discuss the condition and treatment plan with the doctor, and all questions were answered for the patient.  Nonsurgical measures such as injections, medications, or physical therapy may not offer significant relief to this patient.  The discussion lasted 30 minutes.  MORIAH agreed to proceed with the surgery.      Moriah should follow up with LAMONTE DAWSON MD post op.

## 2019-10-10 PROBLEM — M17.12 PRIMARY OSTEOARTHRITIS OF LEFT KNEE: Chronic | Status: ACTIVE | Noted: 2019-10-10

## 2019-10-11 ENCOUNTER — APPOINTMENT (OUTPATIENT)
Dept: GENERAL RADIOLOGY | Facility: HOSPITAL | Age: 59
End: 2019-10-11

## 2019-10-11 ENCOUNTER — ANESTHESIA (OUTPATIENT)
Dept: PERIOP | Facility: HOSPITAL | Age: 59
End: 2019-10-11

## 2019-10-11 ENCOUNTER — ANESTHESIA EVENT (OUTPATIENT)
Dept: PERIOP | Facility: HOSPITAL | Age: 59
End: 2019-10-11

## 2019-10-11 ENCOUNTER — HOSPITAL ENCOUNTER (INPATIENT)
Facility: HOSPITAL | Age: 59
LOS: 1 days | Discharge: HOME HEALTH | End: 2019-10-12
Attending: ORTHOPAEDIC SURGERY | Admitting: ORTHOPAEDIC SURGERY

## 2019-10-11 DIAGNOSIS — Z96.652 STATUS POST LEFT KNEE REPLACEMENT: Primary | ICD-10-CM

## 2019-10-11 PROBLEM — F32.A ANXIETY AND DEPRESSION: Status: ACTIVE | Noted: 2019-10-11

## 2019-10-11 PROBLEM — M17.9 OSTEOARTHRITIS, KNEE: Status: ACTIVE | Noted: 2019-10-11

## 2019-10-11 PROBLEM — G47.30 SLEEP APNEA: Status: ACTIVE | Noted: 2019-10-11

## 2019-10-11 PROBLEM — I10 HYPERTENSION: Status: ACTIVE | Noted: 2019-10-11

## 2019-10-11 PROBLEM — M17.12 PRIMARY OSTEOARTHRITIS OF LEFT KNEE: Chronic | Status: RESOLVED | Noted: 2019-10-10 | Resolved: 2019-10-11

## 2019-10-11 PROBLEM — M17.9 OSTEOARTHRITIS, KNEE: Status: RESOLVED | Noted: 2019-10-11 | Resolved: 2019-10-11

## 2019-10-11 PROBLEM — M19.90 ARTHRITIS: Status: ACTIVE | Noted: 2019-10-11

## 2019-10-11 PROBLEM — F41.9 ANXIETY AND DEPRESSION: Status: ACTIVE | Noted: 2019-10-11

## 2019-10-11 PROCEDURE — 25010000002 NEOSTIGMINE PER 0.5 MG: Performed by: NURSE ANESTHETIST, CERTIFIED REGISTERED

## 2019-10-11 PROCEDURE — C1776 JOINT DEVICE (IMPLANTABLE): HCPCS | Performed by: ORTHOPAEDIC SURGERY

## 2019-10-11 PROCEDURE — 25010000002 PHENYLEPHRINE PER 1 ML: Performed by: NURSE ANESTHETIST, CERTIFIED REGISTERED

## 2019-10-11 PROCEDURE — 0SRD0J9 REPLACEMENT OF LEFT KNEE JOINT WITH SYNTHETIC SUBSTITUTE, CEMENTED, OPEN APPROACH: ICD-10-PCS | Performed by: ORTHOPAEDIC SURGERY

## 2019-10-11 PROCEDURE — 25010000003 CEFAZOLIN IN DEXTROSE 2-4 GM/100ML-% SOLUTION: Performed by: ORTHOPAEDIC SURGERY

## 2019-10-11 PROCEDURE — 97162 PT EVAL MOD COMPLEX 30 MIN: CPT

## 2019-10-11 PROCEDURE — 25010000002 FENTANYL CITRATE (PF) 100 MCG/2ML SOLUTION: Performed by: ANESTHESIOLOGY

## 2019-10-11 PROCEDURE — C9290 INJ, BUPIVACAINE LIPOSOME: HCPCS | Performed by: ORTHOPAEDIC SURGERY

## 2019-10-11 PROCEDURE — 73560 X-RAY EXAM OF KNEE 1 OR 2: CPT

## 2019-10-11 PROCEDURE — C1713 ANCHOR/SCREW BN/BN,TIS/BN: HCPCS | Performed by: ORTHOPAEDIC SURGERY

## 2019-10-11 PROCEDURE — 97110 THERAPEUTIC EXERCISES: CPT

## 2019-10-11 PROCEDURE — 25010000002 MIDAZOLAM PER 1 MG: Performed by: ANESTHESIOLOGY

## 2019-10-11 PROCEDURE — 25010000002 ROPIVACAINE PER 1 MG: Performed by: ANESTHESIOLOGY

## 2019-10-11 PROCEDURE — 25010000003 BUPIVACAINE LIPOSOME 1.3 % SUSPENSION 20 ML VIAL: Performed by: ORTHOPAEDIC SURGERY

## 2019-10-11 PROCEDURE — 25010000002 FENTANYL CITRATE (PF) 100 MCG/2ML SOLUTION: Performed by: NURSE ANESTHETIST, CERTIFIED REGISTERED

## 2019-10-11 PROCEDURE — 25010000002 PROPOFOL 10 MG/ML EMULSION: Performed by: NURSE ANESTHETIST, CERTIFIED REGISTERED

## 2019-10-11 PROCEDURE — 25010000002 ONDANSETRON PER 1 MG: Performed by: NURSE ANESTHETIST, CERTIFIED REGISTERED

## 2019-10-11 PROCEDURE — 97116 GAIT TRAINING THERAPY: CPT

## 2019-10-11 PROCEDURE — 25010000002 DEXAMETHASONE PER 1 MG: Performed by: NURSE ANESTHETIST, CERTIFIED REGISTERED

## 2019-10-11 PROCEDURE — 25010000002 VANCOMYCIN 10 G RECONSTITUTED SOLUTION: Performed by: ORTHOPAEDIC SURGERY

## 2019-10-11 DEVICE — CMT BONE SIMPLEX/P 1/2DOSE 10PK: Type: IMPLANTABLE DEVICE | Site: KNEE | Status: FUNCTIONAL

## 2019-10-11 DEVICE — CAP TOTL KN POROUS/HYBRID PRIMARY: Type: IMPLANTABLE DEVICE | Site: KNEE | Status: FUNCTIONAL

## 2019-10-11 DEVICE — CAP BEAR KN VE UPCHRG: Type: IMPLANTABLE DEVICE | Site: KNEE | Status: FUNCTIONAL

## 2019-10-11 DEVICE — PAT KN PERSONA VE CRS/LNK CMT 8X29MM: Type: IMPLANTABLE DEVICE | Site: KNEE | Status: FUNCTIONAL

## 2019-10-11 DEVICE — STEM TIB/KN PERSONA TRABECULAR 2PEG SZD LT: Type: IMPLANTABLE DEVICE | Site: KNEE | Status: FUNCTIONAL

## 2019-10-11 DEVICE — CAP GUIDE PSI/SIGNATURE/I-ASSIST: Type: IMPLANTABLE DEVICE | Status: FUNCTIONAL

## 2019-10-11 DEVICE — CAP PAT KN VE/TM UPCHRG: Type: IMPLANTABLE DEVICE | Site: KNEE | Status: FUNCTIONAL

## 2019-10-11 DEVICE — ART/SRF KN PERSONA/VE PS CD/CR 6TO7 10MM LT: Type: IMPLANTABLE DEVICE | Site: KNEE | Status: FUNCTIONAL

## 2019-10-11 DEVICE — IMPLANTABLE DEVICE: Type: IMPLANTABLE DEVICE | Site: KNEE | Status: FUNCTIONAL

## 2019-10-11 RX ORDER — HYDROCODONE BITARTRATE AND ACETAMINOPHEN 7.5; 325 MG/1; MG/1
1 TABLET ORAL EVERY 4 HOURS PRN
Status: DISCONTINUED | OUTPATIENT
Start: 2019-10-11 | End: 2019-10-12 | Stop reason: HOSPADM

## 2019-10-11 RX ORDER — FENTANYL CITRATE 50 UG/ML
50 INJECTION, SOLUTION INTRAMUSCULAR; INTRAVENOUS
Status: DISCONTINUED | OUTPATIENT
Start: 2019-10-11 | End: 2019-10-11 | Stop reason: HOSPADM

## 2019-10-11 RX ORDER — BUPIVACAINE HYDROCHLORIDE 5 MG/ML
INJECTION, SOLUTION EPIDURAL; INTRACAUDAL
Status: COMPLETED | OUTPATIENT
Start: 2019-10-11 | End: 2019-10-11

## 2019-10-11 RX ORDER — ONDANSETRON 2 MG/ML
4 INJECTION INTRAMUSCULAR; INTRAVENOUS EVERY 6 HOURS PRN
Status: DISCONTINUED | OUTPATIENT
Start: 2019-10-11 | End: 2019-10-12 | Stop reason: HOSPADM

## 2019-10-11 RX ORDER — BISACODYL 10 MG
10 SUPPOSITORY, RECTAL RECTAL DAILY PRN
Status: DISCONTINUED | OUTPATIENT
Start: 2019-10-11 | End: 2019-10-12 | Stop reason: HOSPADM

## 2019-10-11 RX ORDER — HYDROCODONE BITARTRATE AND ACETAMINOPHEN 7.5; 325 MG/1; MG/1
2 TABLET ORAL EVERY 4 HOURS PRN
Status: DISCONTINUED | OUTPATIENT
Start: 2019-10-11 | End: 2019-10-12 | Stop reason: HOSPADM

## 2019-10-11 RX ORDER — FLUMAZENIL 0.1 MG/ML
0.2 INJECTION INTRAVENOUS AS NEEDED
Status: DISCONTINUED | OUTPATIENT
Start: 2019-10-11 | End: 2019-10-11 | Stop reason: HOSPADM

## 2019-10-11 RX ORDER — NALOXONE HCL 0.4 MG/ML
0.2 VIAL (ML) INJECTION AS NEEDED
Status: DISCONTINUED | OUTPATIENT
Start: 2019-10-11 | End: 2019-10-11 | Stop reason: HOSPADM

## 2019-10-11 RX ORDER — DOCUSATE SODIUM 100 MG/1
100 CAPSULE, LIQUID FILLED ORAL 2 TIMES DAILY
Status: DISCONTINUED | OUTPATIENT
Start: 2019-10-11 | End: 2019-10-12 | Stop reason: HOSPADM

## 2019-10-11 RX ORDER — PROMETHAZINE HYDROCHLORIDE 25 MG/ML
6.25 INJECTION, SOLUTION INTRAMUSCULAR; INTRAVENOUS
Status: DISCONTINUED | OUTPATIENT
Start: 2019-10-11 | End: 2019-10-11 | Stop reason: HOSPADM

## 2019-10-11 RX ORDER — LISINOPRIL 5 MG/1
5 TABLET ORAL
Status: DISCONTINUED | OUTPATIENT
Start: 2019-10-11 | End: 2019-10-12 | Stop reason: HOSPADM

## 2019-10-11 RX ORDER — MELOXICAM 15 MG/1
15 TABLET ORAL EVERY MORNING
Status: DISCONTINUED | OUTPATIENT
Start: 2019-10-11 | End: 2019-10-12 | Stop reason: HOSPADM

## 2019-10-11 RX ORDER — CITALOPRAM 20 MG/1
20 TABLET ORAL EVERY MORNING
Status: DISCONTINUED | OUTPATIENT
Start: 2019-10-11 | End: 2019-10-12 | Stop reason: HOSPADM

## 2019-10-11 RX ORDER — CEFAZOLIN SODIUM 2 G/100ML
2 INJECTION, SOLUTION INTRAVENOUS EVERY 8 HOURS
Status: COMPLETED | OUTPATIENT
Start: 2019-10-11 | End: 2019-10-11

## 2019-10-11 RX ORDER — NALOXONE HCL 0.4 MG/ML
0.1 VIAL (ML) INJECTION
Status: DISCONTINUED | OUTPATIENT
Start: 2019-10-11 | End: 2019-10-12 | Stop reason: HOSPADM

## 2019-10-11 RX ORDER — LABETALOL HYDROCHLORIDE 5 MG/ML
5 INJECTION, SOLUTION INTRAVENOUS
Status: DISCONTINUED | OUTPATIENT
Start: 2019-10-11 | End: 2019-10-11 | Stop reason: HOSPADM

## 2019-10-11 RX ORDER — BISACODYL 5 MG/1
10 TABLET, DELAYED RELEASE ORAL DAILY PRN
Status: DISCONTINUED | OUTPATIENT
Start: 2019-10-11 | End: 2019-10-12 | Stop reason: HOSPADM

## 2019-10-11 RX ORDER — MIDAZOLAM HYDROCHLORIDE 1 MG/ML
1 INJECTION INTRAMUSCULAR; INTRAVENOUS
Status: DISCONTINUED | OUTPATIENT
Start: 2019-10-11 | End: 2019-10-11 | Stop reason: HOSPADM

## 2019-10-11 RX ORDER — OXYCODONE HCL 10 MG/1
20 TABLET, FILM COATED, EXTENDED RELEASE ORAL ONCE
Status: COMPLETED | OUTPATIENT
Start: 2019-10-11 | End: 2019-10-11

## 2019-10-11 RX ORDER — ONDANSETRON 4 MG/1
4 TABLET, FILM COATED ORAL EVERY 6 HOURS PRN
Status: DISCONTINUED | OUTPATIENT
Start: 2019-10-11 | End: 2019-10-12 | Stop reason: HOSPADM

## 2019-10-11 RX ORDER — MIDAZOLAM HYDROCHLORIDE 1 MG/ML
2 INJECTION INTRAMUSCULAR; INTRAVENOUS
Status: DISCONTINUED | OUTPATIENT
Start: 2019-10-11 | End: 2019-10-11 | Stop reason: HOSPADM

## 2019-10-11 RX ORDER — SODIUM CHLORIDE 0.9 % (FLUSH) 0.9 %
3-10 SYRINGE (ML) INJECTION AS NEEDED
Status: DISCONTINUED | OUTPATIENT
Start: 2019-10-11 | End: 2019-10-11 | Stop reason: HOSPADM

## 2019-10-11 RX ORDER — ROPIVACAINE HYDROCHLORIDE 5 MG/ML
INJECTION, SOLUTION EPIDURAL; INFILTRATION; PERINEURAL
Status: COMPLETED | OUTPATIENT
Start: 2019-10-11 | End: 2019-10-11

## 2019-10-11 RX ORDER — EPHEDRINE SULFATE 50 MG/ML
5 INJECTION, SOLUTION INTRAVENOUS ONCE AS NEEDED
Status: DISCONTINUED | OUTPATIENT
Start: 2019-10-11 | End: 2019-10-11 | Stop reason: HOSPADM

## 2019-10-11 RX ORDER — TRANEXAMIC ACID 100 MG/ML
INJECTION, SOLUTION INTRAVENOUS AS NEEDED
Status: DISCONTINUED | OUTPATIENT
Start: 2019-10-11 | End: 2019-10-11 | Stop reason: SURG

## 2019-10-11 RX ORDER — PROMETHAZINE HYDROCHLORIDE 25 MG/ML
12.5 INJECTION, SOLUTION INTRAMUSCULAR; INTRAVENOUS ONCE AS NEEDED
Status: DISCONTINUED | OUTPATIENT
Start: 2019-10-11 | End: 2019-10-11 | Stop reason: HOSPADM

## 2019-10-11 RX ORDER — CEFAZOLIN SODIUM 2 G/100ML
2 INJECTION, SOLUTION INTRAVENOUS ONCE
Status: COMPLETED | OUTPATIENT
Start: 2019-10-11 | End: 2019-10-11

## 2019-10-11 RX ORDER — DIPHENHYDRAMINE HCL 25 MG
25 CAPSULE ORAL
Status: DISCONTINUED | OUTPATIENT
Start: 2019-10-11 | End: 2019-10-11 | Stop reason: HOSPADM

## 2019-10-11 RX ORDER — LIDOCAINE HYDROCHLORIDE 10 MG/ML
0.5 INJECTION, SOLUTION EPIDURAL; INFILTRATION; INTRACAUDAL; PERINEURAL ONCE AS NEEDED
Status: DISCONTINUED | OUTPATIENT
Start: 2019-10-11 | End: 2019-10-11 | Stop reason: HOSPADM

## 2019-10-11 RX ORDER — HYDROMORPHONE HYDROCHLORIDE 1 MG/ML
0.5 INJECTION, SOLUTION INTRAMUSCULAR; INTRAVENOUS; SUBCUTANEOUS
Status: DISCONTINUED | OUTPATIENT
Start: 2019-10-11 | End: 2019-10-11 | Stop reason: HOSPADM

## 2019-10-11 RX ORDER — PROMETHAZINE HYDROCHLORIDE 25 MG/1
25 TABLET ORAL ONCE AS NEEDED
Status: DISCONTINUED | OUTPATIENT
Start: 2019-10-11 | End: 2019-10-11 | Stop reason: HOSPADM

## 2019-10-11 RX ORDER — HYDRALAZINE HYDROCHLORIDE 20 MG/ML
5 INJECTION INTRAMUSCULAR; INTRAVENOUS
Status: DISCONTINUED | OUTPATIENT
Start: 2019-10-11 | End: 2019-10-11 | Stop reason: HOSPADM

## 2019-10-11 RX ORDER — UREA 10 %
1 LOTION (ML) TOPICAL NIGHTLY PRN
Status: DISCONTINUED | OUTPATIENT
Start: 2019-10-11 | End: 2019-10-12 | Stop reason: HOSPADM

## 2019-10-11 RX ORDER — ASPIRIN 81 MG/1
81 TABLET ORAL EVERY 12 HOURS SCHEDULED
Status: DISCONTINUED | OUTPATIENT
Start: 2019-10-12 | End: 2019-10-12 | Stop reason: HOSPADM

## 2019-10-11 RX ORDER — ONDANSETRON 2 MG/ML
4 INJECTION INTRAMUSCULAR; INTRAVENOUS ONCE AS NEEDED
Status: DISCONTINUED | OUTPATIENT
Start: 2019-10-11 | End: 2019-10-11 | Stop reason: HOSPADM

## 2019-10-11 RX ORDER — PROPOFOL 10 MG/ML
VIAL (ML) INTRAVENOUS AS NEEDED
Status: DISCONTINUED | OUTPATIENT
Start: 2019-10-11 | End: 2019-10-11 | Stop reason: SURG

## 2019-10-11 RX ORDER — ONDANSETRON 2 MG/ML
INJECTION INTRAMUSCULAR; INTRAVENOUS AS NEEDED
Status: DISCONTINUED | OUTPATIENT
Start: 2019-10-11 | End: 2019-10-11 | Stop reason: SURG

## 2019-10-11 RX ORDER — DIPHENHYDRAMINE HYDROCHLORIDE 50 MG/ML
12.5 INJECTION INTRAMUSCULAR; INTRAVENOUS
Status: DISCONTINUED | OUTPATIENT
Start: 2019-10-11 | End: 2019-10-11 | Stop reason: HOSPADM

## 2019-10-11 RX ORDER — ACETAMINOPHEN 325 MG/1
650 TABLET ORAL ONCE AS NEEDED
Status: DISCONTINUED | OUTPATIENT
Start: 2019-10-11 | End: 2019-10-11 | Stop reason: HOSPADM

## 2019-10-11 RX ORDER — SODIUM CHLORIDE 9 MG/ML
100 INJECTION, SOLUTION INTRAVENOUS CONTINUOUS
Status: ACTIVE | OUTPATIENT
Start: 2019-10-11 | End: 2019-10-12

## 2019-10-11 RX ORDER — GLYCOPYRROLATE 0.2 MG/ML
INJECTION INTRAMUSCULAR; INTRAVENOUS AS NEEDED
Status: DISCONTINUED | OUTPATIENT
Start: 2019-10-11 | End: 2019-10-11 | Stop reason: SURG

## 2019-10-11 RX ORDER — FENTANYL CITRATE 50 UG/ML
INJECTION, SOLUTION INTRAMUSCULAR; INTRAVENOUS AS NEEDED
Status: DISCONTINUED | OUTPATIENT
Start: 2019-10-11 | End: 2019-10-11 | Stop reason: SURG

## 2019-10-11 RX ORDER — ROCURONIUM BROMIDE 10 MG/ML
INJECTION, SOLUTION INTRAVENOUS AS NEEDED
Status: DISCONTINUED | OUTPATIENT
Start: 2019-10-11 | End: 2019-10-11 | Stop reason: SURG

## 2019-10-11 RX ORDER — HYDROMORPHONE HYDROCHLORIDE 1 MG/ML
0.5 INJECTION, SOLUTION INTRAMUSCULAR; INTRAVENOUS; SUBCUTANEOUS
Status: DISCONTINUED | OUTPATIENT
Start: 2019-10-11 | End: 2019-10-12 | Stop reason: HOSPADM

## 2019-10-11 RX ORDER — ACETAMINOPHEN 325 MG/1
325 TABLET ORAL EVERY 4 HOURS PRN
Status: DISCONTINUED | OUTPATIENT
Start: 2019-10-11 | End: 2019-10-12 | Stop reason: HOSPADM

## 2019-10-11 RX ORDER — OXYCODONE AND ACETAMINOPHEN 7.5; 325 MG/1; MG/1
1 TABLET ORAL ONCE AS NEEDED
Status: DISCONTINUED | OUTPATIENT
Start: 2019-10-11 | End: 2019-10-11 | Stop reason: HOSPADM

## 2019-10-11 RX ORDER — DEXAMETHASONE SODIUM PHOSPHATE 10 MG/ML
INJECTION INTRAMUSCULAR; INTRAVENOUS AS NEEDED
Status: DISCONTINUED | OUTPATIENT
Start: 2019-10-11 | End: 2019-10-11 | Stop reason: SURG

## 2019-10-11 RX ORDER — SODIUM CHLORIDE, SODIUM LACTATE, POTASSIUM CHLORIDE, CALCIUM CHLORIDE 600; 310; 30; 20 MG/100ML; MG/100ML; MG/100ML; MG/100ML
9 INJECTION, SOLUTION INTRAVENOUS CONTINUOUS
Status: DISCONTINUED | OUTPATIENT
Start: 2019-10-11 | End: 2019-10-11 | Stop reason: HOSPADM

## 2019-10-11 RX ORDER — SODIUM CHLORIDE 0.9 % (FLUSH) 0.9 %
3 SYRINGE (ML) INJECTION EVERY 12 HOURS SCHEDULED
Status: DISCONTINUED | OUTPATIENT
Start: 2019-10-11 | End: 2019-10-11 | Stop reason: HOSPADM

## 2019-10-11 RX ORDER — LIDOCAINE HYDROCHLORIDE 20 MG/ML
INJECTION, SOLUTION INFILTRATION; PERINEURAL AS NEEDED
Status: DISCONTINUED | OUTPATIENT
Start: 2019-10-11 | End: 2019-10-11 | Stop reason: SURG

## 2019-10-11 RX ORDER — EPHEDRINE SULFATE 50 MG/ML
INJECTION, SOLUTION INTRAVENOUS AS NEEDED
Status: DISCONTINUED | OUTPATIENT
Start: 2019-10-11 | End: 2019-10-11 | Stop reason: SURG

## 2019-10-11 RX ORDER — PROMETHAZINE HYDROCHLORIDE 25 MG/1
25 SUPPOSITORY RECTAL ONCE AS NEEDED
Status: DISCONTINUED | OUTPATIENT
Start: 2019-10-11 | End: 2019-10-11 | Stop reason: HOSPADM

## 2019-10-11 RX ORDER — ACETAMINOPHEN 500 MG
1000 TABLET ORAL ONCE
Status: COMPLETED | OUTPATIENT
Start: 2019-10-11 | End: 2019-10-11

## 2019-10-11 RX ORDER — FAMOTIDINE 10 MG/ML
20 INJECTION, SOLUTION INTRAVENOUS ONCE
Status: COMPLETED | OUTPATIENT
Start: 2019-10-11 | End: 2019-10-11

## 2019-10-11 RX ORDER — HYDROCODONE BITARTRATE AND ACETAMINOPHEN 7.5; 325 MG/1; MG/1
1 TABLET ORAL ONCE AS NEEDED
Status: DISCONTINUED | OUTPATIENT
Start: 2019-10-11 | End: 2019-10-11 | Stop reason: HOSPADM

## 2019-10-11 RX ADMIN — HYDROCODONE BITARTRATE AND ACETAMINOPHEN 2 TABLET: 7.5; 325 TABLET ORAL at 13:53

## 2019-10-11 RX ADMIN — FENTANYL CITRATE 50 MCG: 50 INJECTION INTRAMUSCULAR; INTRAVENOUS at 07:49

## 2019-10-11 RX ADMIN — MIDAZOLAM 1 MG: 1 INJECTION INTRAMUSCULAR; INTRAVENOUS at 06:24

## 2019-10-11 RX ADMIN — ROPIVACAINE HYDROCHLORIDE 30 ML: 5 INJECTION, SOLUTION EPIDURAL; INFILTRATION; PERINEURAL at 06:48

## 2019-10-11 RX ADMIN — FAMOTIDINE 20 MG: 10 INJECTION INTRAVENOUS at 06:24

## 2019-10-11 RX ADMIN — MIDAZOLAM 1 MG: 1 INJECTION INTRAMUSCULAR; INTRAVENOUS at 06:27

## 2019-10-11 RX ADMIN — NEOSTIGMINE METHYLSULFATE 3 MG: 1 INJECTION INTRAMUSCULAR; INTRAVENOUS; SUBCUTANEOUS at 09:05

## 2019-10-11 RX ADMIN — PROPOFOL 150 MG: 10 INJECTION, EMULSION INTRAVENOUS at 07:06

## 2019-10-11 RX ADMIN — CITALOPRAM 20 MG: 20 TABLET, FILM COATED ORAL at 13:53

## 2019-10-11 RX ADMIN — CEFAZOLIN SODIUM 2 G: 2 INJECTION, SOLUTION INTRAVENOUS at 23:19

## 2019-10-11 RX ADMIN — FENTANYL CITRATE 50 MCG: 50 INJECTION INTRAMUSCULAR; INTRAVENOUS at 07:06

## 2019-10-11 RX ADMIN — SODIUM CHLORIDE, POTASSIUM CHLORIDE, SODIUM LACTATE AND CALCIUM CHLORIDE 9 ML/HR: 600; 310; 30; 20 INJECTION, SOLUTION INTRAVENOUS at 06:25

## 2019-10-11 RX ADMIN — SODIUM CHLORIDE 1250 MG: 9 INJECTION, SOLUTION INTRAVENOUS at 05:53

## 2019-10-11 RX ADMIN — HYDROCODONE BITARTRATE AND ACETAMINOPHEN 2 TABLET: 7.5; 325 TABLET ORAL at 20:41

## 2019-10-11 RX ADMIN — EPHEDRINE SULFATE 10 MG: 50 INJECTION INTRAMUSCULAR; INTRAVENOUS; SUBCUTANEOUS at 07:29

## 2019-10-11 RX ADMIN — BUPIVACAINE HYDROCHLORIDE 30 ML: 5 INJECTION, SOLUTION EPIDURAL; INTRACAUDAL; PERINEURAL at 06:36

## 2019-10-11 RX ADMIN — OXYCODONE HYDROCHLORIDE 20 MG: 10 TABLET, FILM COATED, EXTENDED RELEASE ORAL at 05:54

## 2019-10-11 RX ADMIN — LIDOCAINE HYDROCHLORIDE 100 MG: 20 INJECTION, SOLUTION INFILTRATION; PERINEURAL at 07:06

## 2019-10-11 RX ADMIN — SODIUM CHLORIDE, POTASSIUM CHLORIDE, SODIUM LACTATE AND CALCIUM CHLORIDE: 600; 310; 30; 20 INJECTION, SOLUTION INTRAVENOUS at 09:17

## 2019-10-11 RX ADMIN — ONDANSETRON 4 MG: 2 INJECTION INTRAMUSCULAR; INTRAVENOUS at 08:42

## 2019-10-11 RX ADMIN — FENTANYL CITRATE 50 MCG: 50 INJECTION, SOLUTION INTRAMUSCULAR; INTRAVENOUS at 06:28

## 2019-10-11 RX ADMIN — ACETAMINOPHEN 1000 MG: 500 TABLET, FILM COATED ORAL at 05:53

## 2019-10-11 RX ADMIN — FENTANYL CITRATE 50 MCG: 50 INJECTION, SOLUTION INTRAMUSCULAR; INTRAVENOUS at 06:24

## 2019-10-11 RX ADMIN — CEFAZOLIN SODIUM 2 G: 2 INJECTION, SOLUTION INTRAVENOUS at 07:06

## 2019-10-11 RX ADMIN — PHENYLEPHRINE HYDROCHLORIDE 100 MCG: 10 INJECTION INTRAVENOUS at 07:16

## 2019-10-11 RX ADMIN — ROCURONIUM BROMIDE 50 MG: 10 INJECTION INTRAVENOUS at 07:06

## 2019-10-11 RX ADMIN — CEFAZOLIN SODIUM 2 G: 2 INJECTION, SOLUTION INTRAVENOUS at 16:02

## 2019-10-11 RX ADMIN — EPHEDRINE SULFATE 10 MG: 50 INJECTION INTRAMUSCULAR; INTRAVENOUS; SUBCUTANEOUS at 07:12

## 2019-10-11 RX ADMIN — MELOXICAM 15 MG: 15 TABLET ORAL at 13:53

## 2019-10-11 RX ADMIN — PHENYLEPHRINE HYDROCHLORIDE 100 MCG: 10 INJECTION INTRAVENOUS at 07:14

## 2019-10-11 RX ADMIN — GLYCOPYRROLATE 0.5 MG: 0.2 INJECTION INTRAMUSCULAR; INTRAVENOUS at 09:05

## 2019-10-11 RX ADMIN — DOCUSATE SODIUM 100 MG: 100 CAPSULE, LIQUID FILLED ORAL at 13:54

## 2019-10-11 RX ADMIN — TRANEXAMIC ACID 1000 MG: 100 INJECTION, SOLUTION INTRAVENOUS at 07:16

## 2019-10-11 RX ADMIN — LISINOPRIL 5 MG: 5 TABLET ORAL at 20:40

## 2019-10-11 RX ADMIN — DEXAMETHASONE SODIUM PHOSPHATE 8 MG: 10 INJECTION INTRAMUSCULAR; INTRAVENOUS at 07:15

## 2019-10-11 NOTE — ANESTHESIA PROCEDURE NOTES
Peripheral Block      Patient reassessed immediately prior to procedure    Patient location during procedure: holding area  Start time: 10/11/2019 6:37 AM  Stop time: 10/11/2019 6:49 AM  Reason for block: procedure for pain, at surgeon's request and post-op pain management  Performed by  Anesthesiologist: Jeremiah Pinto MD  Preanesthetic Checklist  Completed: patient identified, site marked, surgical consent, pre-op evaluation, timeout performed, IV checked, risks and benefits discussed and monitors and equipment checked  Prep:  Pt Position: supine  Sterile barriers:cap, gloves, sterile barriers and mask  Prep: ChloraPrep  Patient monitoring: blood pressure monitoring, continuous pulse oximetry and EKG  Procedure  Sedation:yes  Performed under: local infiltration  Guidance:ultrasound guided  ULTRASOUND INTERPRETATION. Using ultrasound guidance a 21 G gauge needle was placed in close proximity to the nerve, at which point, under ultrasound guidance anesthetic was injected in the area of the nerve and spread of the anesthesia was seen on ultrasound in close proximity thereto.  There were no abnormalities seen on ultrasound; a digital image was taken; and the patient tolerated the procedure with no complications. Images:still images obtained, printed/placed on chart    Laterality:left  Block Type:iPack  Injection Technique:single-shot  Needle Type:echogenic  Needle Gauge:21 G  Resistance on Injection: none    Medications Used: ropivacaine (NAROPIN) 0.5 % injection, 30 mL  Med admintered at 10/11/2019 6:48 AM      Post Assessment  Injection Assessment: negative aspiration for heme, no paresthesia on injection and incremental injection  Patient Tolerance:comfortable throughout block  Complications:no

## 2019-10-11 NOTE — CONSULTS
CONSULT NOTE    INTERNAL MEDICINE   [facility]       Patient Identification:  Name: Moriah Weeks  Age: 59 y.o.  Sex: female  :  1960  MRN: [id number]             Date of Consultation:  10/11/2019       Primary Care Physician: Eric Lemos MD                               Requesting Physician: Dr Juarez  Reason for Consultation:medical management    Chief Complaint:  Knee pain    History of Present Illness:        The patient is a 59-year-old white female with history of anxiety depression, arthritis, hypertension sleep apnea who is admitted with left total knee replacement for arthritis.  Medicine was consulted for general medical management.  Postoperative the patient's doing well and denied having any chest pain or shortness of air.  She has not had any nausea or vomiting.  She has been required some oxygen per nasal cannula.      Past Medical History:  Past Medical History:   Diagnosis Date   • Anxiety and depression    • Arthritis    • Hypertension    • Knee pain, left    • Sleep apnea     DOES NOT WEAR CPAP      Past Surgical History:  Past Surgical History:   Procedure Laterality Date   • CARPAL TUNNEL RELEASE Right    • CARPAL TUNNEL RELEASE Left    • COLONOSCOPY     • ENDOMETRIAL ABLATION     • HYSTERECTOMY     • KNEE ARTHROSCOPY Left 2019    Procedure: LEFT KNEE ARTHROSCOPY WITH PARTIAL MEDIAL MENISCETOMY, CHONDROPLASTY AND INTERNAL FIXATION TIBIAL PLATEAU INSUFFICIENCY FRACTURE;  Surgeon: Aaron Fleming MD;  Location: [facility] OR [facility];  Service: Orthopedics      Home Meds:  Medications Prior to Admission   Medication Sig Dispense Refill Last Dose   • acetaminophen (TYLENOL) 500 MG tablet Take 500 mg by mouth Every 6 (Six) Hours As Needed for Mild Pain .   Past Month at Unknown time   • benazepril (LOTENSIN) 40 MG tablet Take 40 mg by mouth Every Morning.   10/10/2019 at 0800   • citalopram (CeleXA) 20 MG tablet Take 20 mg by mouth Every Morning.   10/10/2019 at  0800   • meloxicam (MOBIC) 15 MG tablet Take 15 mg by mouth Every Morning. HELD FOR OR   9/20/2019 at 0800     Current Meds:     Current Facility-Administered Medications:   •  acetaminophen (TYLENOL) tablet 325 mg, 325 mg, Oral, Q4H PRN, China Juarez MD  •  [START ON 10/12/2019] aspirin EC tablet 81 mg, 81 mg, Oral, Q12H, China Juarez MD  •  bisacodyl (DULCOLAX) EC tablet 10 mg, 10 mg, Oral, Daily PRN, China Juarez MD  •  bisacodyl (DULCOLAX) suppository 10 mg, 10 mg, Rectal, Daily PRN, China Juarez MD  •  ceFAZolin in dextrose (ANCEF) IVPB solution 2 g, 2 g, Intravenous, Q8H, China Juarez MD, 2 g at 10/11/19 1602  •  citalopram (CeleXA) tablet 20 mg, 20 mg, Oral, QAM, China Juarez MD, 20 mg at 10/11/19 1353  •  docusate sodium (COLACE) capsule 100 mg, 100 mg, Oral, BID, China Juarez MD, 100 mg at 10/11/19 1354  •  HYDROcodone-acetaminophen (NORCO) 7.5-325 MG per tablet 1 tablet, 1 tablet, Oral, Q4H PRN, China Juarez MD  •  HYDROcodone-acetaminophen (NORCO) 7.5-325 MG per tablet 2 tablet, 2 tablet, Oral, Q4H PRN, China Juarez MD, 2 tablet at 10/11/19 1353  •  HYDROmorphone (DILAUDID) injection 0.5 mg, 0.5 mg, Intravenous, Q2H PRN **AND** naloxone (NARCAN) injection 0.1 mg, 0.1 mg, Intravenous, Q5 Min PRN, China Juarze MD  •  lisinopril (PRINIVIL,ZESTRIL) tablet 5 mg, 5 mg, Oral, Q24H, China Juarez MD  •  melatonin tablet 1 mg, 1 mg, Oral, Nightly PRN, China Juarez MD  •  meloxicam (MOBIC) tablet 15 mg, 15 mg, Oral, QAM, China Juarez MD, 15 mg at 10/11/19 1353  •  ondansetron (ZOFRAN) tablet 4 mg, 4 mg, Oral, Q6H PRN **OR** ondansetron (ZOFRAN) injection 4 mg, 4 mg, Intravenous, Q6H PRN, China Juarez MD  •  sodium chloride 0.9 % infusion, 100 mL/hr, Intravenous, Continuous, China Juarez MD  Allergies:  No Known Allergies  Social History:  "  Social History     Socioeconomic History   • Marital status:      Spouse name: Not on file   • Number of children: Not on file   • Years of education: Not on file   • Highest education level: Not on file   Tobacco Use   • Smoking status: Former Smoker     Years: 10.00     Types: Cigarettes     Last attempt to quit: 2004     Years since quitting: 15.7   • Smokeless tobacco: Never Used   • Tobacco comment: 2-3 CIGS PER DAY    Substance and Sexual Activity   • Alcohol use: Yes     Comment: RARE   • Drug use: Yes     Types: Marijuana     Comment: DAILY    • Sexual activity: Defer     Family History:  Family History   Problem Relation Age of Onset   • Hypertension Mother    • Hypertension Father    • Malig Hyperthermia Neg Hx           Review of Systems  See history of present illness and past medical history.  Patient denies headache, dizziness, syncope, falls, trauma, change in vision, change in hearing, change in taste, changes in weight, changes in appetite, focal weakness, numbness, or paresthesia.  Patient denies chest pain, palpitations, dyspnea, orthopnea, PND, cough, sinus pressure, rhinorrhea, epistaxis, hemoptysis, nausea, vomiting,hematemesis, diarrhea, constipation or hematchezia.  Denies cold or heat intolerance, polydipsia, polyuria, polyphagia. Denies hematuria, pyuria, dysuria, hesitancy, frequency or urgency.  Denies fever, chills, sweats, night sweats.  Denies missing any routine medications. Remainder of ROS is negative.      Vitals:   /86 (BP Location: Right arm, Patient Position: Lying)   Pulse 98   Temp 97.9 °F (36.6 °C)   Resp 16   Ht 154.9 cm (61\")   Wt 87.1 kg (192 lb 0.3 oz)   SpO2 93%   BMI 36.28 kg/m²   I/O:     Intake/Output Summary (Last 24 hours) at 10/11/2019 1718  Last data filed at 10/11/2019 1338  Gross per 24 hour   Intake 1240 ml   Output 100 ml   Net 1140 ml     Exam:  General Appearance:    Alert, cooperative, no distress, appears stated age   Head:    " Normocephalic, without obvious abnormality, atraumatic   Eyes:    PERRL, conjunctiva/corneas clear, EOM's intact, both eyes   Ears:    Normal external ear canals, both ears   Nose:   Nares normal, septum midline, mucosa normal, no drainage    or sinus tenderness   Throat:   Lips, tongue, gums normal; oral mucosa pink and moist   Neck:   Supple, symmetrical, trachea midline, no adenopathy;     thyroid:  no enlargement/tenderness/nodules; no carotid    bruit or JVD   Back:     Symmetric, no curvature, ROM normal, no CVA tenderness   Lungs:     Clear to auscultation bilaterally, respirations unlabored   Chest Wall:    No tenderness or deformity    Heart:    Regular rate and rhythm, S1 and S2 normal, no murmur, rub   or gallop   Abdomen:     Soft, non-tender, bowel sounds active all four quadrants,     no masses, no hepatomegaly, no splenomegaly   Extremities:   Extremities normal, left knee surgical changes, no cyanosis or edema   Pulses:   Pulses palpable in all extremities; symmetric all extremities   Skin:   Skin color normal, Skin is warm and dry,  no rashes or palpable lesions   Neurologic:   CNII-XII intact, motor strength grossly intact, sensation grossly intact to light touch, no focal deficits noted       Data Review:  No results found for: WBC, HGB, HCT, PLT  No results found for: NA, K, CL, CO2, BUN, CREATININE, GLUCOSE  No results found for: CALCIUM, MG, PHOS  No results found for: AST, ALT, ALKPHOS  No results found for: APTT, INR  No results found for: COLORU, CLARITYU, SPECGRAV, PHUR, PROTEINUR, GLUCOSEU, KETONESU, BLOODU, NITRITE, LEUKOCYTESUR, BILIRUBINUR, UROBILINOGEN, RBCUA, WBCUA, BACTERIA, UACOMMENT  No results found for: TROPONINT, TROPONINI, BNP            Imaging Results (last 7 days)     Procedure Component Value Units Date/Time    XR Knee 1 or 2 View Left [[id number]] Collected:  10/11/19 0945     Updated:  10/11/19 0948    Narrative:       PORTABLE JOINT X-RAY     HISTORY: Left knee arthritis  and pain, postop arthroplasty.     Portable x-ray of the left knee is provided.     FINDINGS:  There is arthroplasty hardware, positioned as expected.  No  periprosthetic fracture is identified.  There are expected post  operative   changes in the soft tissues.       Impression:       Left knee arthroplasty as expected.     This report was finalized on 10/11/2019 9:45 AM by Dr. Jeremiah Ventura M.D.       SCANNED - IMAGING [[id number]] Resulted:  10/11/19      Updated:  10/10/19 0911        Past Medical History:   Diagnosis Date   • Anxiety and depression    • Arthritis    • Hypertension    • Knee pain, left    • Sleep apnea     DOES NOT WEAR CPAP        Assessment:  Active Hospital Problems    Diagnosis  POA   • **Status post left knee replacement [Z96.652]  Not Applicable   • Anxiety and depression [F41.9, F32.9]  Unknown   • Arthritis [M19.90]  Unknown   • Hypertension [I10]  Unknown   • Sleep apnea [G47.30]  Unknown      Resolved Hospital Problems    Diagnosis Date Resolved POA   • Osteoarthritis, knee [M17.10] 10/11/2019 Yes   • Primary osteoarthritis of left knee [M17.12] 10/11/2019 Yes       Plan:  The patient appears to be medically stable postop and is requiring some oxygen.  Medicine will follow for general medical management.    Pastor Charles MD   10/11/2019  5:18 PM

## 2019-10-11 NOTE — DISCHARGE PLACEMENT REQUEST
"Moriah Weeks (59 y.o. Female)     Date of Birth Social Security Number Address Home Phone MRN    1960  [id number] [address] [phone] [id number]    Mu-ism Marital Status          Jew        Admission Date Admission Type Admitting Provider Attending Provider Department, Room/Bed    10/11/19 Elective China Juarez MD Yakkanti, Madhusudhan R, MD [address], P898/1    Discharge Date Discharge Disposition Discharge Destination                       Attending Provider:  China Juarez MD    Allergies:  No Known Allergies    Isolation:  None   Infection:  None   Code Status:  CPR    Ht:  154.9 cm (61\")   Wt:  87.1 kg (192 lb 0.3 oz)    Admission Cmt:  None   Principal Problem:  Primary osteoarthritis of left knee [M17.12]                 Active Insurance as of 10/11/2019     Primary Coverage     Payor Plan Insurance Group Employer/Plan Group    [employer] EMPLOYEE [id number]     Payor Plan Address Payor Plan Phone Number Payor Plan Fax Number Effective Dates    [address] [phone]  1/1/2016 - None Entered    [facility] [address]       Subscriber Name Subscriber Birth Date Member ID       MORIAH WEEKS 1960 [id number]                 Emergency Contacts      (Rel.) Home Phone Work Phone Mobile Phone    MickyYifan (Spouse) [phone] -- --        "

## 2019-10-11 NOTE — PLAN OF CARE
Problem: Patient Care Overview  Goal: Plan of Care Review  Outcome: Ongoing (interventions implemented as appropriate)   10/11/19 1410   OTHER   Outcome Summary Pt is a 58 yo F who underwent L TKA. Pt reports PLOF as I. Pt lives with  in [facility] with 2 steps and no rails to enter. Pt did not use any AD prior for ambulation however owns FWW and SPC. Pt presents with L knee pain, generalized weakness, and gait/balance impairments. Pt was CGA for transfers and gait up to 60 ft with FWW. No DME needs prior to DC. Additional therapy would be beneficial to address impairments listed above. Current DC recs pending stair training include home with HHPT.

## 2019-10-11 NOTE — THERAPY EVALUATION
Patient Name: Moriah Weeks  : 1960    MRN: [id number]                              Today's Date: 10/11/2019       Admit Date: 10/11/2019    Visit Dx: No diagnosis found.  Patient Active Problem List   Diagnosis   • Status post left knee replacement     Past Medical History:   Diagnosis Date   • Anxiety and depression    • Arthritis    • Hypertension    • Knee pain, left    • Sleep apnea     DOES NOT WEAR CPAP      Past Surgical History:   Procedure Laterality Date   • CARPAL TUNNEL RELEASE Right    • CARPAL TUNNEL RELEASE Left    • COLONOSCOPY     • ENDOMETRIAL ABLATION     • HYSTERECTOMY     • KNEE ARTHROSCOPY Left 2019    Procedure: LEFT KNEE ARTHROSCOPY WITH PARTIAL MEDIAL MENISCETOMY, CHONDROPLASTY AND INTERNAL FIXATION TIBIAL PLATEAU INSUFFICIENCY FRACTURE;  Surgeon: Aaron Fleming MD;  [place] [facility] [place] [facility]  Service: Orthopedics     General Information     Row Name 10/11/19 1349          PT Evaluation Time/Intention    Document Type  evaluation  -AE     Mode of Treatment  physical therapy  -AE     Row Name 10/11/19 1349          General Information    Patient Profile Reviewed?  yes  -AE     Prior Level of Function  independent:  -AE     Existing Precautions/Restrictions  fall  -AE     Row Name 10/11/19 1349          Relationship/Environment    Lives With  spouse  -AE     Row Name 10/11/19 1349          Resource/Environmental Concerns    Current Living Arrangements  home/apartment/condo  -AE     Row Name 10/11/19 1349          Home Main Entrance    Number of Stairs, Main Entrance  two  -AE     Stair Railings, Main Entrance  none  -AE     Row Name 10/11/19 1349          Stairs Within Home, Primary    Number of Stairs, Within Home, Primary  none  -AE     Row Name 10/11/19 1349          Cognitive Assessment/Intervention- PT/OT    Orientation Status (Cognition)  oriented x 3  -AE     Row Name 10/11/19 1349          Safety Issues, Functional Mobility    Safety Issues Affecting Function  (Mobility)  positioning of assistive device;steps too close to assistive device  -AE     Impairments Affecting Function (Mobility)  balance;endurance/activity tolerance;shortness of breath;strength;range of motion (ROM);pain  -AE       User Key  (r) = Recorded By, (t) = Taken By, (c) = Cosigned By    Initials Name Provider Type    AE Kiana Dimas PT Physical Therapist        Mobility     Row Name 10/11/19 1350          Bed Mobility Assessment/Treatment    Bed Mobility Assessment/Treatment  bed mobility (all) activities  -AE     [place] Level (Bed Mobility)  supervision  -AE     Assistive Device (Bed Mobility)  bed rails  -AE     Row Name 10/11/19 1350          Transfer Assessment/Treatment    Comment (Transfers)  pt performed all transfers with CGA  -AE     Row Name 10/11/19 1350          Bed-Chair Transfer    Bed-Chair [place] (Transfers)  contact guard  -AE     Assistive Device (Bed-Chair Transfers)  walker, front-wheeled  -AE     Row Name 10/11/19 1350          Sit-Stand Transfer    Sit-Stand [place] (Transfers)  contact guard  -AE     Assistive Device (Sit-Stand Transfers)  walker, front-wheeled  -AE     Row Name 10/11/19 1359          Gait/Stairs Assessment/Training    Gait/Stairs Assessment/Training  gait/ambulation independence;gait/ambulation assistive device  -AE     [place] Level (Gait)  contact guard  -AE     Assistive Device (Gait)  walker, front-wheeled  -AE     Distance in Feet (Gait)  60 ft  -AE     Pattern (Gait)  step-to  -AE     Deviations/Abnormal Patterns (Gait)  gait speed decreased;jory decreased  -AE     Bilateral Gait Deviations  weight shift ability decreased  -AE       User Key  (r) = Recorded By, (t) = Taken By, (c) = Cosigned By    Initials Name Provider Type    AE Kiana Dimas PT Physical Therapist        Obj/Interventions     Row Name 10/11/19 1351          General ROM    GENERAL ROM COMMENTS  L knee AROM 75%; limited due to pain  -AE     Row Name  10/11/19 1351          MMT (Manual Muscle Testing)    General MMT Comments  generalized weakness  -AE     Row Name 10/11/19 1351          Static Sitting Balance    Level of [place] (Unsupported Sitting, Static Balance)  conditional independence  -AE     Row Name 10/11/19 [facility]          Static Standing Balance    Level of [place] (Supported Standing, Static Balance)  supervision  -AE       User Key  (r) = Recorded By, (t) = Taken By, (c) = Cosigned By    Initials Name Provider Type    AE Kiana Dimas, PT Physical Therapist        Goals/Plan     Row Name 10/11/19 1353          Transfer Goal 1 (PT)    Activity/Assistive Device (Transfer Goal 1, PT)  transfers, all  -AE     [place] Level/Cues Needed (Transfer Goal 1, PT)  conditional independence  -AE     Time Frame (Transfer Goal 1, PT)  1 week  -AE     Progress/Outcome (Transfer Goal 1, PT)  continuing progress toward goal  -AE     [facility] Name 10/11/19 [facility]          Gait Training Goal 1 (PT)    Activity/Assistive Device (Gait Training Goal 1, PT)  gait (walking locomotion);assistive device use  -AE     [place] Level (Gait Training Goal 1, PT)  conditional independence  -AE     Distance (Gait Goal 1, PT)  150 ft  -AE     Time Frame (Gait Training Goal 1, PT)  1 week  -AE     Progress/Outcome (Gait Training Goal 1, PT)  continuing progress toward goal  -AE     [facility] Name 10/11/19 [facility]          ROM Goal 1 (PT)    ROM Goal 1 (PT)  5-90 deg L knee flex AROM  -AE     Time Frame (ROM Goal 1, PT)  1 week  -AE     Progress/Outcome (ROM Goal 1, PT)  continuing progress toward goal  -AE     [facility] Name 10/11/19 [facility]          Stairs Goal 1 (PT)    Activity/Assistive Device (Stairs Goal 1, PT)  stairs, all skills  -AE     [place] Level/Cues Needed (Stairs Goal 1, PT)  contact guard assist  -AE     Number of Stairs (Stairs Goal 1, PT)  2 steps no rails  -AE     Time Frame (Stairs Goal 1, PT)  1 week  -AE     Progress/Outcome (Stairs Goal 1, PT)  continuing  progress toward goal  -AE       User Key  (r) = Recorded By, (t) = Taken By, (c) = Cosigned By    Initials Name Provider Type    AE Kiana Dimas PT Physical Therapist        Clinical Impression     Row Name 10/11/19 1352          Pain Assessment    Additional Documentation  Pain Scale: Numbers Pre/Post-Treatment (Group)  -AE     Row Name 10/11/19 1352          Pain Scale: Numbers Pre/Post-Treatment    Pain Scale: Numbers, Pretreatment  0/10 - no pain  -AE     Pain Scale: Numbers, Post-Treatment  7/10  -AE     Pain Location - Side  Left  -AE     Pain Location - Orientation  incisional  -AE     Pain Location  knee  -AE     Pain Intervention(s)  Home medication;Cold applied;Ambulation/increased activity;Elevated;Rest  -AE     Row Name 10/11/19 1352          Plan of Care Review    Plan of Care Reviewed With  patient;spouse  -AE     Row Name 10/11/19 1352          Physical Therapy Clinical Impression    Patient/Family Goals Statement (PT Clinical Impression)  Pt wants to return home  -AE     Criteria for Skilled Interventions Met (PT Clinical Impression)  yes;treatment indicated  -AE     Rehab Potential (PT Clinical Summary)  good, to achieve stated therapy goals  -AE     Row Name 10/11/19 1352          Positioning and Restraints    Pre-Treatment Position  in bed  -AE     Post Treatment Position  chair  -AE     In Chair  reclined;call light within reach;encouraged to call for assist;exit alarm on;with family/caregiver;LLE elevated  -AE       User Key  (r) = Recorded By, (t) = Taken By, (c) = Cosigned By    Initials Name Provider Type    AE Kiana Dimas, RONIT Physical Therapist        Outcome Measures     Row Name 10/11/19 0362          How much help from another person do you currently need...    Turning from your back to your side while in flat bed without using bedrails?  4  -AE     Moving from lying on back to sitting on the side of a flat bed without bedrails?  3  -AE     Moving to and from a bed to a chair  (including a wheelchair)?  3  -AE     Standing up from a chair using your arms (e.g., wheelchair, bedside chair)?  3  -AE     Climbing 3-5 steps with a railing?  2  -AE     To walk in hospital room?  3  -AE     AM-PAC 6 Clicks Score (PT)  18  -AE     Row Name 10/11/19 1412          Functional Assessment    Outcome Measure Options  AM-PAC 6 Clicks Basic Mobility (PT)  -AE       User Key  (r) = Recorded By, (t) = Taken By, (c) = Cosigned By    Initials Name Provider Type    AE Kiana Dimas PT Physical Therapist        Physical Therapy Education     Title: PT OT SLP Therapies (Done)     Topic: Physical Therapy (Done)     Point: Mobility training (Done)     Learning Progress Summary           Patient Eager, E,TB, VU,DU by AE at 10/11/2019  2:09 PM                   Point: Home exercise program (Done)     Learning Progress Summary           Patient Eager, E,TB, VU,DU by AE at 10/11/2019  2:09 PM                   Point: Body mechanics (Done)     Learning Progress Summary           Patient Eager, E,TB, VU,DU by AE at 10/11/2019  2:09 PM                   Point: Precautions (Done)     Learning Progress Summary           Patient Eager, E,TB, VU,DU by AE at 10/11/2019  2:09 PM                               User Key     Initials Effective Dates Name Provider Type Discipline    AE 09/04/19 -  Kiana Dimas PT Physical Therapist PT              PT Recommendation and Plan  Planned Therapy Interventions (PT Eval): balance training, bed mobility training, gait training, home exercise program, ROM (range of motion), strengthening, stair training, transfer training  Plan of Care Reviewed With: patient, spouse  Outcome Summary: Pt is a 58 yo F who underwent L TKA. Pt reports PLOF as I. Pt lives with  in [facility] with 2 steps and no rails to enter. Pt did not use any AD prior for ambulation however owns FWW and SPC. Pt presents with L knee pain, generalized weakness, and gait/balance impairments. Pt was CGA for transfers  and gait up to 60 ft with FWW. No DME needs prior to DC. Additional therapy would be beneficial to address impairments listed above. Current DC recs pending stair training include home with HHPT.      Time Calculation:   PT Charges     Row Name 10/11/19 1413             Time Calculation    Start Time  1315  -AE      Stop Time  1340  -AE      Time Calculation (min)  25 min  -AE      PT Received On  10/11/19  -AE      PT - Next Appointment  10/12/19  -AE      PT Goal Re-Cert Due Date  10/18/19  -AE         Time Calculation- PT    Total Timed Code Minutes- PT  23 minute(s)  -AE        User Key  (r) = Recorded By, (t) = Taken By, (c) = Cosigned By    Initials Name Provider Type    AE Kiana Dimas, RONIT Physical Therapist        Therapy Charges for Today     Code Description Service Date Service Provider Modifiers Qty    84928429526 HC PT EVAL MOD COMPLEXITY 2 10/11/2019 Kiana Dimas, PT GP 1    02199670893 HC GAIT TRAINING EA 15 MIN 10/11/2019 Kiana Dimas, PT GP 1    21166363869 HC PT THER PROC EA 15 MIN 10/11/2019 Kiana Dimas, PT GP 1          PT G-Codes  Outcome Measure Options: AM-PAC 6 Clicks Basic Mobility (PT)  AM-PAC 6 Clicks Score (PT): 18    Kiana Dimas PT  10/11/2019

## 2019-10-11 NOTE — OP NOTE
Patient: Moriah Weeks  YOB: 1960  Medical Record Number: [id number]  Attending Physician: China Juarez,*  Primary Care Physician: Eric Lemos MD    Date of Service: 10/11/2019    Surgeon: China Juarez MD        DATE OF PROCEDURE: 10/11/2019    Pre-op Diagnosis:   Primary osteoarthritis of left knee    Post-Op Diagnosis Codes:  Primary osteoarthritis of left knee [M17.12]    PROCEDURE PERFORMED: LEFT TOTAL KNEE ARTHROPLASTY  PSI CR PROTOCOL by utilizing a Medial parapatellar Approach  Memie Persona , PSI   Natural tibia TM two peg fixed bearing base plate size # D,   Cruciate Retaining femur TM Narrow size # 7,   Tibial-bearing insert E Highly cross linked poly Medial Congruent size # D, 10 mm thick   All poly patella size # 29, 8 mm thickness  (Emmie).     The tibia , femur were press fit and the patella were cemented in utilizing Simplex  cement.     Implant Name Type Inv. Item Serial No.  Lot No. LRB No. Used   CMT BONE SIMPLEX/P 1/2DOSE 10PK - [id number] Implant CMT BONE SIMPLEX/P 1/2DOSE 10PK  [employer] [id number] Left 1   STEM TIB PERSONA TRABECULAR 2PEG SZD LT - [id number] Implant STEM TIB PERSONA TRABECULAR 2PEG SZD LT  [employer] [id number] Left 1   PAT KN PERSONA VE CRS/LNK CMT 29MM - [id number] Implant PAT KN PERSONA VE CRS/LNK CMT 29MM  [employer] [id number] Left 1   ART/SRF KN PERSONA/VE PS CD/CR 6TO7 10MM LT - [id number] Implant ART/SRF KN PERSONA/VE PS CD/CR 6TO7 10MM LT  [employer] [id number] Left 1       SURGEON: China Juarez MD     ASSISTANT: Justin Lopez MD Fellow     DIEGO Sanchez    The services of a skilled  first assist were necessary for performing the procedure safely and expeditiously.  The first assist was present for the entire duration of the case and helped with positioning, retraction and closure of the incision.      ANESTHESIA: General  Anesthesiologist: Jeremiah Pinto MD  CRNA: Maira Levy  CRNA  General anaesthesia with a regional adductor canal block  and intraoperative periarticular  Exparel and marcaine injection.    Estimated Blood Loss: 100ml    Specimens: * No orders in the log *    COMPLICATIONS: Nil.     DRAINS:      INDICATIONS: The patient is a 59 y.o. female  who is known to me from progressively  worsening  knee pain  over the past several months. The patient has reached the point of disability and is having difficulty with activities of daily living including, but not limited to: difficulty getting up from a chair, difficulty ambulating distances, difficulty with stairs, and complains of night pain . Having failed nonoperative management, treatment options and alternatives were discussed in detail with the patient who is indicated for a total knee arthroplasty.     Likely risks and benefits of the procedure, including but not limited to infection, DVT, pulmonary embolism, stiffness,  future loosening of the implants, possibility of injury to tendons, ligaments, nerves or vessels, numbness , footdrop and periprosthetic fractures have been discussed in detail. Despite the risks involved, the patient elected to proceed and informed consent was obtained and the patient was scheduled for surgery. The patient was seen in the preoperative holding area and the operative site was marked.     She failed a sub chondroplasty left knee.     DESCRIPTION OF PROCEDURE:   The patient was transferred to [facility] operating room. Preoperative antibiotics in the form of Vancomycin and Kefzol  intravenously was infused prior to the incision and prior to the tourniquet placement according to the SCIP protocol. A surgical time out was done with the team and the correct patient, surgical side and site were identified. After achieving adequate general anesthesia, a well-padded tourniquet was placed over the proximal aspect of the operative thigh. The operative leg was prepped and draped in the  usual sterile fashion. Tourniquet was elevated to a pressure of 250 mmHg.     Trannexamic acid was given intravenously prior to incision.      A skin incision was made vertically oriented centering over the patella anteriorly. Skin and subcutaneous tissue were incised and a medial parapatellar approach was developed. There was a large effusion. The patella was subluxed and the knee was inspected. There was complete loss of articular cartilage on the medial distal femoral condyle with corresponding areas of loss of articular cartilage in the medial tibial plateau. There were osteophytes in the notch and also medial tibia. There were extensive osteophytes around the patella and there was complete loss of articular cartilage in the trochlear groove of the femur and also corresponding areas of the patella.       The patella measured 24 mm in thickness; 8 mm of patella was resected and lug holes were drilled for a symmetric patella 29 mm diameter, 8 mm thickness. The trial button was found to be seated satisfactorily.    A distal femoral cut was completed utilizing a PSI block resecting 10 mm of distal femur with 0 degrees of valgus, mechanical alignment. The distal femoral cut was found to be satisfactory.     Attention was then directed to the proximal tibia. PSI block was utilized and medial tibial thickness of 6 mm   was utilized amounting to 9 mm on the lateral side. The proximal tibial cut was made with 5° posterior slope and neutral varus valgus. The proximal tibial cut was found to be satisfactory.     Attention was again directed to the distal femur. The distal femur was sized maintaining a posterior referencing. I have used an extension block initially followed by a verification of the flexion gap. I have   used the tensor with Emmie Fuzion sizer. This was done along with verification of  Matthew’s line and epicondylar axis maintaining correct rotational alignment and a 4-in-1 cut was completed for the  distal femur utilizing a size # 7 cutting block. Anterior, posterior and chamfer cuts were completed.     The PCL was  robust  intact and supple and it was planned to retain it.      Posterior osteophytes were resected from the distal femur. Medial and lateral meniscectomy was completed. Proximal tibia was sized and a trial reduction was performed. Reduction was found to be satisfactory with range of motion from 0° to 120° with the patella tracking well.     Having been satisfied with the trials, the bony surfaces were prepared for cementation after punching the proximal tibia, maintaining the correct rotational alignment. As the bone quality was satisfactory. I have elected to proceed with press-fit components. Exparel was infiltrated into the posterior capsule medially and laterally. Followed by this, the tibial component was seated into position followed by the femoral component, followed by seating of the 10 mm thick liner.      I elected to proceed with cemented patella half batch of Simplex cement was utilized and the patella component was cemented in. Excess cement was removed. The component was held without any movement. The rest of the Exparel was infiltrated into the periarticular tissue.     Again, range of motion was checked and the range was satisfactory from 0° to 120° with excellent stability throughout the range of motion. Good medial and lateral tissue tension, and soft tissue balancing. The patella was tracking well. Having been satisfied with this, the joint was thoroughly irrigated with saline. Soft tissue hemostasis was secured.     The sponge count and needle count was correct.  Arthrotomy was closed with Ethibond sutures followed by closure of the incision in layers with Vicryl sutures and staples. Sterile dressings were placed and the patient was transferred to the recovery room in stable condition.     The patient tolerated the procedure well and is being admitted for postoperative  antibiotics according to the SCIP protocol for 2 more doses in the first twenty four hours.   Aspirin will be started from postop day # 0 for DVT prophylaxis.  The patient will be mobilized in am with physical therapy.    I discussed the satisfactory performance of the procedure with the patient's family and discussed with them The postoperative management.    China Juarez M.D.    10/11/2019    CC: Eric Lemos MD; MD Larry Guzman Madhusudhan R,*

## 2019-10-11 NOTE — PROGRESS NOTES
Continued Stay Note  [facility]     Patient Name: Moriah Weeks  MRN: [id number]  Today's Date: 10/11/2019    Admit Date: 10/11/2019    Discharge Plan     Row Name 10/11/19 3860       Plan    Plan  [facility]    Patient/Family in Agreement with Plan  yes    Plan Comments  Spoke with pt, verified correct information on facesheet and explained the role of CCP. Pt would like to d/c home with [facility], referral given to Mari with [facility] who states they are able to accept. Plan will be to d/c home with [facility] and family support. No other needs identified.         Discharge Codes    No documentation.             Jaimee Deluca RN

## 2019-10-11 NOTE — ANESTHESIA PROCEDURE NOTES
Peripheral Block      Patient reassessed immediately prior to procedure    Patient location during procedure: holding area  Start time: 10/11/2019 6:23 AM  Stop time: 10/11/2019 6:37 AM  Reason for block: procedure for pain, at surgeon's request and post-op pain management  Performed by  Anesthesiologist: Jeremiah Pinto MD  Preanesthetic Checklist  Completed: patient identified, site marked, surgical consent, pre-op evaluation, timeout performed, IV checked, risks and benefits discussed and monitors and equipment checked  Prep:  Pt Position: supine  Sterile barriers:cap, gloves, sterile barriers and mask  Prep: ChloraPrep  Patient monitoring: blood pressure monitoring, continuous pulse oximetry and EKG  Procedure  Sedation:yes  Performed under: local infiltration  Guidance:ultrasound guided  ULTRASOUND INTERPRETATION. Using ultrasound guidance a 21 G gauge needle was placed in close proximity to the nerve, at which point, under ultrasound guidance anesthetic was injected in the area of the nerve and spread of the anesthesia was seen on ultrasound in close proximity thereto.  There were no abnormalities seen on ultrasound; a digital image was taken; and the patient tolerated the procedure with no complications. Images:still images obtained, printed/placed on chart    Laterality:left  Block Type:adductor canal block  Injection Technique:single-shot  Needle Type:echogenic  Needle Gauge:21 G  Resistance on Injection: none    Medications Used: bupivacaine (PF) (MARCAINE) 0.5 % injection, 30 mL  Med admintered at 10/11/2019 6:36 AM      Post Assessment  Injection Assessment: negative aspiration for heme, no paresthesia on injection and incremental injection  Patient Tolerance:comfortable throughout block  Complications:no

## 2019-10-11 NOTE — ANESTHESIA PROCEDURE NOTES
Airway  Urgency: elective    Date/Time: 10/11/2019 7:10 AM  Airway not difficult    General Information and Staff    Patient location during procedure: OR  Anesthesiologist: Jeremiah Pinto MD  CRNA: Maira Levy CRNA    Indications and Patient Condition  Indications for airway management: airway protection    Preoxygenated: yes  MILS maintained throughout  Mask difficulty assessment: 2 - vent by mask + OA or adjuvant +/- NMBA    Final Airway Details  Final airway type: endotracheal airway      Successful airway: ETT  Cuffed: yes   Successful intubation technique: direct laryngoscopy  Facilitating devices/methods: intubating stylet  Endotracheal tube insertion site: oral  Blade: CMAC  Blade size: D  ETT size (mm): 7.0  Cormack-Lehane Classification: grade III - view of epiglottis only  Placement verified by: chest auscultation and capnometry   Cuff volume (mL): 8  Measured from: lips  ETT/EBT  to lips (cm): 21  Number of attempts at approach: 2  Assessment: lips, teeth, and gum same as pre-op and atraumatic intubation    Additional Comments  Patient in OR. Monitors on. BLVS. Pre 02 100%. Caps noted. Short TMD. SIVI. DL x1 with mac 3. Grade 3 view.  Decision to use cmac. DL x1 with D blade. DVVC. Atraumatic placement of ETT. Placement verified with ETC02 and BBS. ETT secured. Teeth/lips in pre-op condition.

## 2019-10-11 NOTE — ANESTHESIA PREPROCEDURE EVALUATION
Anesthesia Evaluation     Patient summary reviewed and Nursing notes reviewed   no history of anesthetic complications:  NPO Solid Status: > 8 hours  NPO Liquid Status: > 8 hours           Airway   Mallampati: II  TM distance: >3 FB  Neck ROM: full  No difficulty expected  Dental - normal exam     Pulmonary    (+) a smoker Former, sleep apnea,   (-) asthma, rhonchi, decreased breath sounds, wheezes  Cardiovascular   Exercise tolerance: good (4-7 METS)    ECG reviewed  Rhythm: regular  Rate: normal    (+) hypertension,   (-) CAD, dysrhythmias, angina, SALGUERO, murmur    ROS comment: 9/24 NSR rt 68    Neuro/Psych  (-) seizures, CVA  GI/Hepatic/Renal/Endo    (+) obesity,     (-) no renal disease, diabetes    Musculoskeletal     Abdominal     Abdomen: soft.   Substance History      OB/GYN          Other   (+) arthritis                     Anesthesia Plan    ASA 3     general with block   (Left Adductor canal and IPACK planned )  intravenous induction   Anesthetic plan, all risks, benefits, and alternatives have been provided, discussed and informed consent has been obtained with: patient.

## 2019-10-11 NOTE — ANESTHESIA POSTPROCEDURE EVALUATION
"Patient: Moriah Weeks    Procedure Summary     Date:  10/11/19 Room / Location:  [facility] OR [address] [place] [address] [facility]    Anesthesia Start:  0700 Anesthesia Stop:  0929    Procedure:  TOTAL KNEE ARTHROPLASTY  PSI CR PROTOCOL (Left Knee) Diagnosis:       Primary osteoarthritis of left knee      (Primary osteoarthritis of left knee)    Surgeon:  China Juarez MD Provider:  Jeremiah Pinto MD    Anesthesia Type:  general with block ASA Status:  3          Anesthesia Type: general with block  Last vitals  BP   119/76 (10/11/19 1048)   Temp   36.4 °C (97.5 °F) (10/11/19 1048)   Pulse   92 (10/11/19 1048)   Resp   16 (10/11/19 1048)     SpO2   95 % (10/11/19 1048)     Post Anesthesia Care and Evaluation    Patient location during evaluation: bedside  Patient participation: complete - patient participated  Level of consciousness: awake and alert  Pain management: adequate  Airway patency: patent  Anesthetic complications: No anesthetic complications    Cardiovascular status: acceptable  Respiratory status: acceptable  Hydration status: acceptable    Comments: /76 (BP Location: Left arm, Patient Position: Lying)   Pulse 92   Temp 36.4 °C (97.5 °F) (Oral)   Resp 16   Ht 154.9 cm (61\")   Wt 87.1 kg (192 lb 0.3 oz)   SpO2 95%   BMI 36.28 kg/m²       "

## 2019-10-12 VITALS
DIASTOLIC BLOOD PRESSURE: 85 MMHG | HEART RATE: 84 BPM | WEIGHT: 192.02 LBS | SYSTOLIC BLOOD PRESSURE: 138 MMHG | HEIGHT: 61 IN | BODY MASS INDEX: 36.25 KG/M2 | OXYGEN SATURATION: 96 % | RESPIRATION RATE: 16 BRPM | TEMPERATURE: 98.9 F

## 2019-10-12 LAB
ANION GAP SERPL CALCULATED.3IONS-SCNC: 9.6 MMOL/L (ref 5–15)
BASOPHILS # BLD AUTO: 0.01 10*3/MM3 (ref 0–0.2)
BASOPHILS NFR BLD AUTO: 0.1 % (ref 0–1.5)
BUN BLD-MCNC: 12 MG/DL (ref 8–23)
BUN/CREAT SERPL: 17.6 (ref 7–25)
CALCIUM SPEC-SCNC: 8.8 MG/DL (ref 8.6–10.5)
CHLORIDE SERPL-SCNC: 101 MMOL/L (ref 98–107)
CO2 SERPL-SCNC: 29.4 MMOL/L (ref 22–29)
CREAT BLD-MCNC: 0.68 MG/DL (ref 0.57–1)
DEPRECATED RDW RBC AUTO: 40.1 FL (ref 37–54)
EOSINOPHIL # BLD AUTO: 0 10*3/MM3 (ref 0–0.4)
EOSINOPHIL NFR BLD AUTO: 0 % (ref 0.3–6.2)
ERYTHROCYTE [DISTWIDTH] IN BLOOD BY AUTOMATED COUNT: 12.3 % (ref 12.3–15.4)
GFR SERPL CREATININE-BSD FRML MDRD: 88 ML/MIN/1.73
GLUCOSE BLD-MCNC: 124 MG/DL (ref 65–99)
HCT VFR BLD AUTO: 36.2 % (ref 34–46.6)
HGB BLD-MCNC: 12.3 G/DL (ref 12–15.9)
IMM GRANULOCYTES # BLD AUTO: 0.06 10*3/MM3 (ref 0–0.05)
IMM GRANULOCYTES NFR BLD AUTO: 0.6 % (ref 0–0.5)
LYMPHOCYTES # BLD AUTO: 1.19 10*3/MM3 (ref 0.7–3.1)
LYMPHOCYTES NFR BLD AUTO: 11.4 % (ref 19.6–45.3)
MCH RBC QN AUTO: 30.5 PG (ref 26.6–33)
MCHC RBC AUTO-ENTMCNC: 34 G/DL (ref 31.5–35.7)
MCV RBC AUTO: 89.8 FL (ref 79–97)
MONOCYTES # BLD AUTO: 0.75 10*3/MM3 (ref 0.1–0.9)
MONOCYTES NFR BLD AUTO: 7.2 % (ref 5–12)
NEUTROPHILS # BLD AUTO: 8.41 10*3/MM3 (ref 1.7–7)
NEUTROPHILS NFR BLD AUTO: 80.7 % (ref 42.7–76)
NRBC BLD AUTO-RTO: 0 /100 WBC (ref 0–0.2)
PLATELET # BLD AUTO: 200 10*3/MM3 (ref 140–450)
PMV BLD AUTO: 11.6 FL (ref 6–12)
POTASSIUM BLD-SCNC: 4.3 MMOL/L (ref 3.5–5.2)
RBC # BLD AUTO: 4.03 10*6/MM3 (ref 3.77–5.28)
SODIUM BLD-SCNC: 140 MMOL/L (ref 136–145)
WBC NRBC COR # BLD: 10.42 10*3/MM3 (ref 3.4–10.8)

## 2019-10-12 PROCEDURE — 85025 COMPLETE CBC W/AUTO DIFF WBC: CPT | Performed by: ORTHOPAEDIC SURGERY

## 2019-10-12 PROCEDURE — 90686 IIV4 VACC NO PRSV 0.5 ML IM: CPT | Performed by: ORTHOPAEDIC SURGERY

## 2019-10-12 PROCEDURE — G0008 ADMIN INFLUENZA VIRUS VAC: HCPCS | Performed by: ORTHOPAEDIC SURGERY

## 2019-10-12 PROCEDURE — 97150 GROUP THERAPEUTIC PROCEDURES: CPT

## 2019-10-12 PROCEDURE — 80048 BASIC METABOLIC PNL TOTAL CA: CPT | Performed by: ORTHOPAEDIC SURGERY

## 2019-10-12 PROCEDURE — 97110 THERAPEUTIC EXERCISES: CPT

## 2019-10-12 PROCEDURE — 25010000002 INFLUENZA VAC SPLIT QUAD 0.5 ML SUSPENSION PREFILLED SYRINGE: Performed by: ORTHOPAEDIC SURGERY

## 2019-10-12 RX ORDER — BISACODYL 5 MG/1
10 TABLET, DELAYED RELEASE ORAL DAILY PRN
Qty: 10 TABLET | Refills: 0 | Status: SHIPPED | OUTPATIENT
Start: 2019-10-12

## 2019-10-12 RX ORDER — HYDROCODONE BITARTRATE AND ACETAMINOPHEN 7.5; 325 MG/1; MG/1
1 TABLET ORAL EVERY 4 HOURS PRN
Qty: 42 TABLET | Refills: 0 | Status: SHIPPED | OUTPATIENT
Start: 2019-10-12 | End: 2019-10-21

## 2019-10-12 RX ORDER — ONDANSETRON 4 MG/1
4 TABLET, FILM COATED ORAL EVERY 6 HOURS PRN
Qty: 30 TABLET | Refills: 0 | Status: SHIPPED | OUTPATIENT
Start: 2019-10-12

## 2019-10-12 RX ORDER — DOCUSATE SODIUM 100 MG/1
100 CAPSULE, LIQUID FILLED ORAL 2 TIMES DAILY
Qty: 28 CAPSULE | Refills: 0 | Status: SHIPPED | OUTPATIENT
Start: 2019-10-12 | End: 2019-10-26

## 2019-10-12 RX ORDER — ASPIRIN 81 MG/1
81 TABLET ORAL EVERY 12 HOURS SCHEDULED
Qty: 41 TABLET | Refills: 0 | Status: SHIPPED | OUTPATIENT
Start: 2019-10-12 | End: 2019-11-02

## 2019-10-12 RX ADMIN — DOCUSATE SODIUM 100 MG: 100 CAPSULE, LIQUID FILLED ORAL at 09:51

## 2019-10-12 RX ADMIN — CITALOPRAM 20 MG: 20 TABLET, FILM COATED ORAL at 09:51

## 2019-10-12 RX ADMIN — INFLUENZA A VIRUS A/BRISBANE/02/2018 IVR-190 (H1N1) ANTIGEN (PROPIOLACTONE INACTIVATED), INFLUENZA A VIRUS A/KANSAS/14/2017 X-327 (H3N2) ANTIGEN (PROPIOLACTONE INACTIVATED), INFLUENZA B VIRUS B/MARYLAND/15/2016 ANTIGEN (PROPIOLACTONE INACTIVATED), INFLUENZA B VIRUS B/PHUKET/3073/2013 BVR-1B ANTIGEN (PROPIOLACTONE INACTIVATED) 0.5 ML: 15; 15; 15; 15 INJECTION, SUSPENSION INTRAMUSCULAR at 13:16

## 2019-10-12 RX ADMIN — MELOXICAM 15 MG: 15 TABLET ORAL at 09:50

## 2019-10-12 RX ADMIN — HYDROCODONE BITARTRATE AND ACETAMINOPHEN 2 TABLET: 7.5; 325 TABLET ORAL at 11:40

## 2019-10-12 RX ADMIN — HYDROCODONE BITARTRATE AND ACETAMINOPHEN 2 TABLET: 7.5; 325 TABLET ORAL at 07:35

## 2019-10-12 RX ADMIN — ASPIRIN 81 MG: 81 TABLET, COATED ORAL at 09:51

## 2019-10-12 RX ADMIN — HYDROCODONE BITARTRATE AND ACETAMINOPHEN 2 TABLET: 7.5; 325 TABLET ORAL at 02:12

## 2019-10-12 RX ADMIN — LISINOPRIL 5 MG: 5 TABLET ORAL at 09:51

## 2019-10-12 NOTE — PLAN OF CARE
Problem: Patient Care Overview  Goal: Plan of Care Review  Outcome: Ongoing (interventions implemented as appropriate)   10/11/19 2001   OTHER   Outcome Summary Patient admitted from PACU in stable condition after undergoing L TKA. Vitals are stable and voiding function is intact. Pain is managed with po meds. Patient is ambulating using walker and x1 assist. Patient educated on bp monitoring, med management and monitoring and O2 sats postop, especially with sleep, d/t hx of AIMEE. Patient anticipates d/c home with hh tomorrow .   Coping/Psychosocial   Plan of Care Reviewed With patient   Plan of Care Review   Progress improving       Problem: Fall Risk (Adult)  Goal: Identify Related Risk Factors and Signs and Symptoms  Outcome: Outcome(s) achieved Date Met: 10/11/19   10/11/19 2001   Fall Risk (Adult)   Related Risk Factors (Fall Risk) environment unfamiliar;sensory deficits;gait/mobility problems;objects hard to reach;fatigue/slow reaction;slippery/uneven surfaces   Signs and Symptoms (Fall Risk) presence of risk factors     Goal: Absence of Fall  Outcome: Ongoing (interventions implemented as appropriate)   10/11/19 2001   Fall Risk (Adult)   Absence of Fall achieves outcome       Problem: Knee Arthroplasty (Total, Partial) (Adult)  Goal: Signs and Symptoms of Listed Potential Problems Will be Absent, Minimized or Managed (Knee Arthroplasty)  Outcome: Ongoing (interventions implemented as appropriate)   10/11/19 2001   Goal/Outcome Evaluation   Problems Assessed (Knee Arthroplasty) all   Problems Present (Knee Arthroplasty) functional deficit;pain;range of motion decreased     Goal: Anesthesia/Sedation Recovery  Outcome: Ongoing (interventions implemented as appropriate)   10/11/19 2001   Goal/Outcome Evaluation   Anesthesia/Sedation Recovery progressing toward baseline

## 2019-10-12 NOTE — PROGRESS NOTES
Patient: Moriah Weeks  YOB: 1960     Date of Admission: 10/11/2019  5:02 AM Medical Record Number: [id number]     Attending Physician: China Juarez,Elle    Procedure(s):  TOTAL KNEE ARTHROPLASTY  PSI CR PROTOCOL Post Operative Day Number: 1    Subjective : No new orthopaedic complaints     Pain Relief: some relief with present medication.     Systemic Complaints: No Complaints  Vitals:    10/11/19 1506 10/11/19 2040 10/11/19 2320 10/12/19 0248   BP: 122/86 150/100 120/81 116/81   BP Location: Right arm Left arm Right arm Right arm   Patient Position: Lying Lying Lying Lying   Pulse: 98 79 86 74   Resp: 16 16 16 16   Temp: 97.9 °F (36.6 °C) 97.4 °F (36.3 °C) 98.6 °F (37 °C) 97.9 °F (36.6 °C)   TempSrc:  Oral Oral Oral   SpO2: 93% 95% 94% 92%   Weight:       Height:           Physical Exam: 59 y.o. female    General Appearance:       Alert, cooperative, in no acute distress                  Extremities:    Dressing Clean, Dry and Intact         Incision healthy without signs or symptoms of infections         No clinical sign of DVT        Able to do good movements of digits    Pulses:   Pulses palpable and equal bilaterally           Diagnostic Tests:     Results from last 7 days   Lab Units 10/12/19  0425   WBC 10*3/mm3 10.42   HEMOGLOBIN g/dL 12.3   HEMATOCRIT % 36.2   PLATELETS 10*3/mm3 200     Results from last 7 days   Lab Units 10/12/19  0425   SODIUM mmol/L 140   POTASSIUM mmol/L 4.3   CHLORIDE mmol/L 101   CO2 mmol/L 29.4*   BUN mg/dL 12   CREATININE mg/dL 0.68   GLUCOSE mg/dL 124*   CALCIUM mg/dL 8.8         No results found for: CRP  No results found for: SEDRATE  No results found for: URICACID  No results found for: CRYSTAL  Microbiology Results (last 10 days)     ** No results found for the last 240 hours. **        Xr Knee 1 Or 2 View Left    Result Date: 10/11/2019  Left knee arthroplasty as expected.  This report was finalized on 10/11/2019 9:45 AM by Dr. Jeremiah Ventura,  M.D.              Current Medications:  Scheduled Meds:  aspirin 81 mg Oral Q12H   citalopram 20 mg Oral QAM   docusate sodium 100 mg Oral BID   influenza vaccine 0.5 mL Intramuscular Once   lisinopril 5 mg Oral Q24H   meloxicam 15 mg Oral QAM     Continuous Infusions:  sodium chloride 100 mL/hr     PRN Meds:.•  acetaminophen  •  bisacodyl  •  bisacodyl  •  HYDROcodone-acetaminophen  •  HYDROcodone-acetaminophen  •  HYDROmorphone **AND** naloxone  •  melatonin  •  ondansetron **OR** ondansetron    Assessment:    Procedure(s):  TOTAL KNEE ARTHROPLASTY  PSI CR PROTOCOL    Patient Active Problem List   Diagnosis   • Status post left knee replacement   • Anxiety and depression   • Arthritis   • Hypertension   • Sleep apnea       PLAN:   Continues current post-op course  Anticoagulation: Aspirin started  Dressing Change PRN  Mobilize with PT as tolerated per protocol  Wean from O2 as tolerated. Discussed proper use of IS and importance of pulmonary hygiene.     Weight Bearing: WBAT  Discharge Plan: OK to plan for discharge in  today to home and home health  from orthopadic perspective.      Elle Salter, APRN    Date: 10/12/2019    Time: 6:50 AM

## 2019-10-12 NOTE — DISCHARGE INSTRUCTIONS
[facility] Total Knee Replacement Discharge Instructions     I. ACTIVITIES:  1. Exercises:  ? Complete exercise program as taught by the hospital physical therapist 2 times per day  ? Exercise program will be advanced by your home health physical therapist  ? During the day be up ambulating every 2 hours (while awake) for short distances  ? Complete the ankle pump exercises at least 10 times per hour (while awake)  ? Elevate legs most of the day the first week post operatively and thereafter elevate legs when in bed and for at least 30 minutes during the day.   ? Caution must be taken to avoid pillow placement under the bend of the knee as this can led to flexion contractures of the knee. Pillow placement under the heel is encouraged.  ? Use cold packs 20-30 minutes approximately 5 times per day. This should be done before and after completing your exercises and at any time you are experiencing pain/ stiffness in your operative extremity.      2. Activities of Daily Living:  ? No tub baths, hot tubs, or swimming pools for 4 weeks  ? May shower and let water run over the incision on post-operative day #5 if no drainage. Do not scrub or rub the incision. Simply let the water run over the incision and pat dry.    II. Restrictions  ? Do not cross legs or kneel  ? Your surgeon will discuss with you when you will be able to drive again. Usual guidelines are you are to be off pain medications prior to driving.  ? Weight bearing is as tolerated  ? First week stay inside on even terrain. May go up and down stairs one stair at a time utilizing the hand rail.  ? After one week, you may venture outside.    III. Precautions:  ? Everyone that comes near you should wash their hands  ? No elective dental, genital-urinary, or colon procedures or surgical procedures for 12 weeks after surgery unless absolutely necessary.  ?  If dental work or surgical procedure is deemed absolutely necessary, you will need to contact your surgeon as  you will need to take antibiotics 1 hour prior to any dental work (including teeth cleanings).  ? Please discuss with your surgeon prophylactic antibiotics as the length of time this intervention will be necessary for you varies with each patient’s health history and situation.  ? Avoid sick people. If you must be around someone who is ill, they should wear a mask.  ? Avoid visits to the Emergency Room or Urgent Care. If you feel you need to go to the emergency room, please notify your surgeon.    ? Stockings are to be worn for one week after surgery and are to be placed on in the morning and removed at night. Observe your skin when stocking is removed for any problems. Monitor the stockings to ensure that any swelling is not causing the stockings to become too tight. In this case, remove stockings immediately.    IV. INCISION CARE:  ? Wash your hands prior to dressing changes  ? Change the dressing as needed to keep incision clean and dry. Utilize dry gauze and paper tape. Avoid touching the side of the gauze that goes against the incision with your hands.  ? No creams or ointments to the incision  ? May remove dressing once the incision is free of drainage  ? Do not touch or pick at the incision  ? Check incision every day and notify surgeon immediately if any of the following signs or symptoms are noted:  o Increase in redness  o Increase in swelling around the incision and of the entire extremity  o Increase in pain  o Drainage oozing from the incision  o Pulling apart of the edges of the incision  o Increase in overall body temperature (greater than 100.5 degrees)  ? Your  Staples will be removed between 10-14 days postoperation.  This may be done by either the home health nurse, rehabilitation nurse or during your return visit to Dr. Juarez's office.  You will then be instructed on how to care for the incision.  (Please call the office if your staples have not been removed within 14 days after  surgery).    V. Medications:   1. Anticoagulants: You will be discharged on an anticoagulant. This is a prophylactic medication that helps prevent blood clots during your post-operative period. You will be on Aspirin 81 mg Enteric Coated every 12 hours orally for 21 days. If you were on Aspirin 81 mg prior to surgery hold it and restart once your Aspirin 325 mg is completed.     ? While taking the anticoagulant, you should avoid taking any additional aspirin, ibuprofen (Advil or Motrin), Aleve (Naprosyn) or other non-steroidal anti-inflammatory medications.   ? Notify surgeon immediately if any kevin bleeding is noted in the urine, stool, emesis, or from the nose or the incision. Blood in the stool will often appear as black rather than red. Blood in urine may appear as pink. Blood in emesis may appear as brown/black like coffee grounds.  ? You will need to apply pressure for longer periods of time to any cuts or abrasions to stop bleeding  ? Avoid alcohol while taking anticoagulants    2. Stool Softeners: You will be at greater risk of constipation after surgery due to being less mobile and the pain medications.   ? Take stool softeners as instructed by your surgeon while on pain medications. Over the counter Colace 100 mg 1-2 capsules twice daily.   ? If stools become too loose or too frequent, please decreases the dosage or stop the stool softener.  ? If constipation occurs despite use of stool softeners, you are to continue the stool softeners and add a laxative (Milk of Magnesia 1 ounce daily as needed).  ? Dulcolax oral tabs or suppository, or a fleets enema can also be utilized for constipation and can be obtained over the counter.   ? If above interventions are unsuccessful in inducing bowel movements, please contact your surgeon's office / family physician's office.  ? Drink plenty of fluids, and eat fruits and vegetables during your recovery time    3. Pain Medications utilized after surgery are narcotics  and the law requires that the following information be given to all patients that are prescribed narcotics:  ? CLASSIFICATION: Pain medications are called Opioids and are narcotics  ? LEGALITIES: It is illegal to share narcotics with others and to drive within 24 hours of taking narcotics  ? POTENTIAL SIDE EFFECTS: Potential side effects of opioids include: nausea, vomiting, itching, dizziness, drowsiness, dry mouth, constipation, and difficulty urinating.  ? POTENTIAL ADVERSE EFFECTS:   o Opioid tolerance can develop with use of pain medications and this simply means that it requires more and more of the medication to control pain; however, this is seen more in patients that use opioids for longer periods of time.  o Opioid dependence can develop with use of Opioids and this simply means that to stop the medication can cause withdrawal symptoms; however, this is seen with patients that use Opioids for longer periods of time.  o Opioid addiction can develop with use of Opioids and the incidence of this is very unlikely in patients who take the medications as ordered and stop the medications as instructed.  o Opioid overdose can be dangerous, but is unlikely when the medication is taken as ordered and stopped when ordered. It is important not to mix opioids with alcohol or with and type of sedative such as Benadryl as this can lead to over sedation and respiratory difficulty.  ? DOSAGE:   o Pain medications will need to be taken consistently for the first week to decrease pain and promote adequate pain relief and participation in physical therapy.  o After the initial surgical pain begins to resolve, you may begin to decrease the pain medication. By the end of 6 weeks, you should be off of pain medications.  o Refills will not be given by the office during evening hours, on weekends, or after 6 weeks post-op.  o To seek refills on pain medications during the initial 6 week post-operative period, you must call the  office 48 hours in advance to request the refill. The office will then notify you when to  the prescription. DO NOT wait until you are out of the medication to request a refill.    V. FOLLOW-UP VISITS:  ? You will need to follow up in the office with your surgeon on October 28 ,2019. Please call this number [phone] to schedule this appointment.  ? If you have any concerns or suspected complications prior to your follow up visit, please call your surgeons office. Do not wait until your appointment time if you suspect complications. These will need to be addressed in the office promptly

## 2019-10-12 NOTE — DISCHARGE SUMMARY
Orthopedic Discharge Summary      Patient: Moriah Weeks   YOB: 1960    Medical Record Number: [id number]    Attending Physician: China Juarez,*    Consulting Physician(s):   Consulting Physician(s)     Provider Relationship Specialty    Pastor Charles MD Consulting Physician Hospitalist          Date of Admission: 10/11/2019  5:02 AM   Date of Discharge:      Admitting Diagnosis: Osteoarthritis, knee [M17.10]    Procedures Performed  Procedure(s):  TOTAL KNEE ARTHROPLASTY  PSI CR PROTOCOL       Patient Active Problem List   Diagnosis   • Status post left knee replacement   • Anxiety and depression   • Arthritis   • Hypertension   • Sleep apnea        No Known Allergies       Discharge Medications      New Medications      Instructions Start Date   aspirin 81 MG EC tablet   81 mg, Oral, Every 12 Hours Scheduled      bisacodyl 5 MG EC tablet  Commonly known as:  DULCOLAX   10 mg, Oral, Daily PRN      docusate sodium 100 MG capsule  Commonly known as:  COLACE   100 mg, Oral, 2 Times Daily      HYDROcodone-acetaminophen 7.5-325 MG per tablet  Commonly known as:  NORCO   1 tablet, Oral, Every 4 Hours PRN      ondansetron 4 MG tablet  Commonly known as:  ZOFRAN   4 mg, Oral, Every 6 Hours PRN         Continue These Medications      Instructions Start Date   acetaminophen 500 MG tablet  Commonly known as:  TYLENOL   500 mg, Oral, Every 6 Hours PRN      benazepril 40 MG tablet  Commonly known as:  LOTENSIN   40 mg, Oral, Every Morning      citalopram 20 MG tablet  Commonly known as:  CeleXA   20 mg, Oral, Every Morning      meloxicam 15 MG tablet  Commonly known as:  MOBIC   15 mg, Oral, Every Morning, HELD FOR OR                Past Medical History:   Diagnosis Date   • Anxiety and depression    • Arthritis    • Hypertension    • Knee pain, left    • Sleep apnea     DOES NOT WEAR CPAP         Past Surgical History:   Procedure Laterality Date   • CARPAL TUNNEL RELEASE Right    • CARPAL TUNNEL  RELEASE Left    • COLONOSCOPY     • ENDOMETRIAL ABLATION     • HYSTERECTOMY     • KNEE ARTHROSCOPY Left 4/17/2019    Procedure: LEFT KNEE ARTHROSCOPY WITH PARTIAL MEDIAL MENISCETOMY, CHONDROPLASTY AND INTERNAL FIXATION TIBIAL PLATEAU INSUFFICIENCY FRACTURE;  Surgeon: Aaron Fleming MD;  [place] [facility] [place] [facility] [place]  Service: [place]opedics        Social History     Occupational History   • Not on file   Tobacco Use   • Smoking status: Former Smoker     Years: 10.00     Types: Cigarettes     Last attempt to quit: 2004     Years since quitting: 15.7   • Smokeless tobacco: Never Used   • Tobacco comment: 2-3 CIGS PER DAY    Substance and Sexual Activity   • Alcohol use: Yes     Comment: RARE   • Drug use: Yes     Types: Marijuana     Comment: DAILY    • Sexual activity: Defer      Social History     Social History Narrative   • Not on file        Family History   Problem Relation Age of Onset   • Hypertension Mother    • Hypertension Father    • Malig Hyperthermia Neg Hx        Physical Exam: 59 y.o. female  General Appearance:    Alert, cooperative, in no acute distress                      Vitals:    10/11/19 2320 10/12/19 0248 10/12/19 0700 10/12/19 0955   BP: 120/81 116/81 119/78    BP Location: Right arm Right arm Right arm    Patient Position: Lying Lying Lying    Pulse: 86 74 86    Resp: 16 16 16    Temp: 98.6 °F (37 °C) 97.9 °F (36.6 °C) 98 °F (36.7 °C)    TempSrc: Oral Oral Oral    SpO2:  92% 94% 97%   Weight:       Height:            Hospital Course:  59 y.o. female admitted to [facility] to services of China Juarez,Elle with Osteoarthritis knee  on 10/11/2019 and underwent a LEFT total knee arthroplasty Per ASHA Nichole. Antibiotic and VTE prophylaxis were per SCIP protocols and included Kefzol  every 8 hours and Aspirin daily . Post-operatively the patient transferred to the post-operative floor where the patient underwent mobilization therapy that included active as well as passive ROM  exercises. Opioids were titrated to achieve appropriate pain management to allow for participation in mobilization exercises. Vital signs are now stable. The incision is intact without signs or symptoms of infection. Operative extremity neurovascular status remains intact.     Appropriate education re: incision care, activity levels, medications, and follow up visits was completed and all questions were answered. The patient is now deemed stable for discharge.      DISCHARGE DISPOSITION AND PLAN:  The  Patient is being discharged home with home health for PT  2-3 X per week for 2-3 weeks and nursing care as needed.     DIAGNOSTIC TESTS:     Admission on 10/11/2019   Component Date Value Ref Range Status   • Glucose 10/12/2019 124* 65 - 99 mg/dL Final   • BUN 10/12/2019 12  8 - 23 mg/dL Final   • Creatinine 10/12/2019 0.68  0.57 - 1.00 mg/dL Final   • Sodium 10/12/2019 140  136 - 145 mmol/L Final   • Potassium 10/12/2019 4.3  3.5 - 5.2 mmol/L Final   • Chloride 10/12/2019 101  98 - 107 mmol/L Final   • CO2 10/12/2019 29.4* 22.0 - 29.0 mmol/L Final   • Calcium 10/12/2019 8.8  8.6 - 10.5 mg/dL Final   • eGFR Non African Amer 10/12/2019 88  >60 mL/min/1.73 Final   • BUN/Creatinine Ratio 10/12/2019 17.6  7.0 - 25.0 Final   • Anion Gap 10/12/2019 9.6  5.0 - 15.0 mmol/L Final   • WBC 10/12/2019 10.42  3.40 - 10.80 10*3/mm3 Final   • RBC 10/12/2019 4.03  3.77 - 5.28 10*6/mm3 Final   • Hemoglobin 10/12/2019 12.3  12.0 - 15.9 g/dL Final   • Hematocrit 10/12/2019 36.2  34.0 - 46.6 % Final   • MCV 10/12/2019 89.8  79.0 - 97.0 fL Final   • MCH 10/12/2019 30.5  26.6 - 33.0 pg Final   • MCHC 10/12/2019 34.0  31.5 - 35.7 g/dL Final   • RDW 10/12/2019 12.3  12.3 - 15.4 % Final   • RDW-SD 10/12/2019 40.1  37.0 - 54.0 fl Final   • MPV 10/12/2019 11.6  6.0 - 12.0 fL Final   • Platelets 10/12/2019 200  140 - 450 10*3/mm3 Final   • Neutrophil % 10/12/2019 80.7* 42.7 - 76.0 % Final   • Lymphocyte % 10/12/2019 11.4* 19.6 - 45.3 % Final   •  Monocyte % 10/12/2019 7.2  5.0 - 12.0 % Final   • Eosinophil % 10/12/2019 0.0* 0.3 - 6.2 % Final   • Basophil % 10/12/2019 0.1  0.0 - 1.5 % Final   • Immature Grans % 10/12/2019 0.6* 0.0 - 0.5 % Final   • Neutrophils, Absolute 10/12/2019 8.41* 1.70 - 7.00 10*3/mm3 Final   • Lymphocytes, Absolute 10/12/2019 1.19  0.70 - 3.10 10*3/mm3 Final   • Monocytes, Absolute 10/12/2019 0.75  0.10 - 0.90 10*3/mm3 Final   • Eosinophils, Absolute 10/12/2019 0.00  0.00 - 0.40 10*3/mm3 Final   • Basophils, Absolute 10/12/2019 0.01  0.00 - 0.20 10*3/mm3 Final   • Immature Grans, Absolute 10/12/2019 0.06* 0.00 - 0.05 10*3/mm3 Final   • nRBC 10/12/2019 0.0  0.0 - 0.2 /100 WBC Final       No results found for: URICACID  No results found for: CRYSTAL  Microbiology Results (last 10 days)     ** No results found for the last 240 hours. **        Xr Knee 1 Or 2 View Left    Result Date: 10/11/2019  Left knee arthroplasty as expected.  This report was finalized on 10/11/2019 9:45 AM by Dr. Jeremiah Ventura M.D.        Discharge and Follow up Instructions:     Total Knee Joint Replacement Discharge Instructions:    I. ACTIVITIES:  1. Exercises:  ? Complete exercise program as taught by the hospital physical therapist 2 times per day  ? Exercise program will be advanced by your home health physical therapist  ? During the day be up ambulating every 2 hours (while awake) for short distances  ? Complete the ankle pump exercises at least 10 times per hour (while awake)  ? Elevate legs most of the day the first week post operatively and thereafter elevate legs when in bed and for at least 30 minutes during the day.   ? Caution must be taken to avoid pillow placement under the bend of the knee as this can led to flexion contractures of the knee. Pillow placement under the heel is encouraged.  ? Use cold packs 20-30 minutes approximately 5 times per day. This should be done before and after completing your exercises and at any time you are  experiencing pain/ stiffness in your operative extremity.      2. Activities of Daily Living:  ? No tub baths, hot tubs, or swimming pools for 4 weeks  ? May shower and let water run over the incision on post-operative day #5 if no drainage. Do not scrub or rub the incision. Simply let the water run over the incision and pat dry.    II. Restrictions  ? Do not cross legs or kneel  ? Your surgeon will discuss with you when you will be able to drive again. Usual guidelines are you are to be off pain medications prior to driving.  ? Weight bearing is as tolerated  ? First week stay inside on even terrain. May go up and down stairs one stair at a time utilizing the hand rail.  ? After one week, you may venture outside.    III. Precautions:  ? Everyone that comes near you should wash their hands  ? No elective dental, genital-urinary, or colon procedures or surgical procedures for 12 weeks after surgery unless absolutely necessary.  ?  If dental work or surgical procedure is deemed absolutely necessary, you will need to contact your surgeon as you will need to take antibiotics 1 hour prior to any dental work (including teeth cleanings).  ? Please discuss with your surgeon prophylactic antibiotics as the length of time this intervention will be necessary for you varies with each patient’s health history and situation.  ? Avoid sick people. If you must be around someone who is ill, they should wear a mask.  ? Avoid visits to the Emergency Room or Urgent Care. If you feel you need to go to the emergency room, please notify your surgeon.    ? Stockings are to be worn for one week after surgery and are to be placed on in the morning and removed at night. Observe your skin when stocking is removed for any problems. Monitor the stockings to ensure that any swelling is not causing the stockings to become too tight. In this case, remove stockings immediately.    IV. INCISION CARE:  ? Wash your hands prior to dressing  changes  ? Change the dressing as needed to keep incision clean and dry. Utilize dry gauze and paper tape. Avoid touching the side of the gauze that goes against the incision with your hands.  ? No creams or ointments to the incision  ? May remove dressing once the incision is free of drainage  ? Do not touch or pick at the incision  ? Check incision every day and notify surgeon immediately if any of the following signs or symptoms are noted:  o Increase in redness  o Increase in swelling around the incision and of the entire extremity  o Increase in pain  o Drainage oozing from the incision  o Pulling apart of the edges of the incision  o Increase in overall body temperature (greater than 100.5 degrees)    ?  You have absorbable sutures with steristrips, please do not remove the  steristrips for 14 days, you can shower on them 6 days after surgery.      V. Medications:   1. Anticoagulants: You will be discharged on an anticoagulant. This is a prophylactic medication that helps prevent blood clots during your post-operative period.  You will be on Unrftyv41 mg twice daily for 21 days. If you were on Aspirin 81 mg prior to surgery you can go back to home dose once the 21 days are completed.     ? While taking the anticoagulant, you should avoid taking any additional aspirin, ibuprofen (Advil or Motrin), Aleve (Naprosyn) or other non-steroidal anti-inflammatory medications.   ? Notify surgeon immediately if any kevin bleeding is noted in the urine, stool, emesis, or from the nose or the incision. Blood in the stool will often appear as black rather than red. Blood in urine may appear as pink. Blood in emesis may appear as brown/black like coffee grounds.  ? You will need to apply pressure for longer periods of time to any cuts or abrasions to stop bleeding  ? Avoid alcohol while taking anticoagulants    2. Stool Softeners: You will be at greater risk of constipation after surgery due to being less mobile and the pain  medications.   ? Take stool softeners as instructed by your surgeon while on pain medications. Over the counter Colace 100 mg 1-2 capsules twice daily.   ? If stools become too loose or too frequent, please decreases the dosage or stop the stool softener.  ? If constipation occurs despite use of stool softeners, you are to continue the stool softeners and add a laxative (Milk of Magnesia 1 ounce daily as needed).  ? Dulcolax oral tabs or suppository, or a fleets enema can also be utilized for constipation and can be obtained over the counter.   ? If above interventions are unsuccessful in inducing bowel movements, please contact your surgeon's office / family physician's office.  ? Drink plenty of fluids, and eat fruits and vegetables during your recovery time    3. Pain Medications utilized after surgery are narcotics and the law requires that the following information be given to all patients that are prescribed narcotics:  ? CLASSIFICATION: Pain medications are called Opioids and are narcotics  ? LEGALITIES: It is illegal to share narcotics with others and to drive within 24 hours of taking narcotics  ? POTENTIAL SIDE EFFECTS: Potential side effects of opioids include: nausea, vomiting, itching, dizziness, drowsiness, dry mouth, constipation, and difficulty urinating.  ? POTENTIAL ADVERSE EFFECTS:   o Opioid tolerance can develop with use of pain medications and this simply means that it requires more and more of the medication to control pain; however, this is seen more in patients that use opioids for longer periods of time.  o Opioid dependence can develop with use of Opioids and this simply means that to stop the medication can cause withdrawal symptoms; however, this is seen with patients that use Opioids for longer periods of time.  o Opioid addiction can develop with use of Opioids and the incidence of this is very unlikely in patients who take the medications as ordered and stop the medications as  instructed.  o Opioid overdose can be dangerous, but is unlikely when the medication is taken as ordered and stopped when ordered. It is important not to mix opioids with alcohol or with and type of sedative such as Benadryl as this can lead to over sedation and respiratory difficulty.  ? DOSAGE:   o Pain medications will need to be taken consistently for the first week to decrease pain and promote adequate pain relief and participation in physical therapy.  o After the initial surgical pain begins to resolve, you may begin to decrease the pain medication. By the end of 6 weeks, you should be off of pain medications.  o Refills will not be given by the office during evening hours, on weekends, or after 6 weeks post-op.  o To seek refills on pain medications during the initial 6 week post-operative period, you must call the office 48 hours in advance to request the refill. The office will then notify you when to  the prescription. DO NOT wait until you are out of the medication to request a refill.    V. FOLLOW-UP VISITS:  ? You will need to follow up in the office with your surgeon on October 28, 2019. Please call this number [phone] to schedule this appointment.  ? If you have any concerns or suspected complications prior to your follow up visit, please call your surgeons office. Do not wait until your appointment time if you suspect complications. These will need to be addressed in the office promptly.      Date:     ASHA Nichole    CC: Eric Lemos MD; ASHA Nichole Madhusudhan R,*

## 2019-10-12 NOTE — THERAPY TREATMENT NOTE
Patient Name: Moriah Weeks  : 1960    MRN: [id number]                              Today's Date: 10/12/2019       Admit Date: 10/11/2019    Visit Dx: No diagnosis found.  Patient Active Problem List   Diagnosis   • Status post left knee replacement   • Anxiety and depression   • Arthritis   • Hypertension   • Sleep apnea     Past Medical History:   Diagnosis Date   • Anxiety and depression    • Arthritis    • Hypertension    • Knee pain, left    • Sleep apnea     DOES NOT WEAR CPAP      Past Surgical History:   Procedure Laterality Date   • CARPAL TUNNEL RELEASE Right    • CARPAL TUNNEL RELEASE Left    • COLONOSCOPY     • ENDOMETRIAL ABLATION     • HYSTERECTOMY     • KNEE ARTHROSCOPY Left 2019    Procedure: LEFT KNEE ARTHROSCOPY WITH PARTIAL MEDIAL MENISCETOMY, CHONDROPLASTY AND INTERNAL FIXATION TIBIAL PLATEAU INSUFFICIENCY FRACTURE;  Surgeon: Aaron Fleming MD;  Location[place] [facility] [place] [facility] [place]  Service: Orthopedics     General Information     Row Name 10/12/19 1304          PT Evaluation Time/Intention    Document Type  therapy note (daily note)  -     Mode of Treatment  physical therapy;group therapy  -     Row Name 10/12/19 1304          General Information    Existing Precautions/Restrictions  fall  -     Row Name 10/12/19 1304          Cognitive Assessment/Intervention- PT/OT    Orientation Status (Cognition)  oriented x 3  -       User Key  (r) = Recorded By, (t) = Taken By, (c) = Cosigned By    Initials Name Provider Type     Angeli Moore PTA Physical Therapy Assistant        Mobility     Row Name 10/12/19 1304          Sit-Stand Transfer    Sit-Stand [place] (Transfers)  contact guard  -     Assistive Device (Sit-Stand Transfers)  walker, front-wheeled  -     Row Name 10/12/19 1304          Gait/Stairs Assessment/Training    [place] Level (Gait)  contact guard  -     Assistive Device (Gait)  walker, front-wheeled  -     Distance in Feet (Gait)  80  -EH     Pattern  (Gait)  step-to  -EH     Deviations/Abnormal Patterns (Gait)  gait speed decreased;jory decreased  -EH     Bilateral Gait Deviations  weight shift ability decreased  -     [place] Level (Stairs)  contact guard  -EH     Handrail Location (Stairs)  both sides  -EH     Number of Steps (Stairs)  4  -EH     Ascending Technique (Stairs)  step-to-step  -EH     Descending Technique (Stairs)  step-to-step  -EH       User Key  (r) = Recorded By, (t) = Taken By, (c) = Cosigned By    Initials Name Provider Type     Angeli Moore PTA Physical Therapy Assistant        Obj/Interventions     Row Name 10/12/19 1305          General ROM    GENERAL ROM COMMENTS  Left Knee AROM -8, 87  -     Row Name 10/12/19 1305          Therapeutic Exercise    Comment (Therapeutic Exercise)  Left TKA protocol X15 reps  -       User Key  (r) = Recorded By, (t) = Taken By, (c) = Cosigned By    Initials Name Provider Type     Angeli Moore PTA Physical Therapy Assistant        Goals/Plan    No documentation.       Clinical Impression     Row Name 10/12/19 1306          Pain Scale: Numbers Pre/Post-Treatment    Pain Location - Side  Left  -EH     Pain Location  knee  -EH     Pain Intervention(s)  Ambulation/increased activity;Repositioned  -     Row Name 10/12/19 1306          Plan of Care Review    Plan of Care Reviewed With  patient  -[facility] Name 10/12/19 1306          Positioning and Restraints    Pre-Treatment Position  sitting in chair/recliner  -     Post Treatment Position  chair  -EH     In Chair  reclined;call light within reach;encouraged to call for assist;exit alarm on  -     Row Name 10/12/19 1306          Pain Scale: Word Pre/Post-Treatment    Pain: Word Scale, Pretreatment  4 - moderate pain  -       User Key  (r) = Recorded By, (t) = Taken By, (c) = Cosigned By    Initials Name Provider Type     Angeli Moore PTA Physical Therapy Assistant        Outcome Measures     Row Name 10/12/19 1306          How  much help from another person do you currently need...    Turning from your back to your side while in flat bed without using bedrails?  4  -EH     Moving from lying on back to sitting on the side of a flat bed without bedrails?  3  -EH     Moving to and from a bed to a chair (including a wheelchair)?  3  -EH     Standing up from a chair using your arms (e.g., wheelchair, bedside chair)?  3  -EH     Climbing 3-5 steps with a railing?  3  -EH     To walk in hospital room?  3  -EH     AM-PAC 6 Clicks Score (PT)  19  -       User Key  (r) = Recorded By, (t) = Taken By, (c) = Cosigned By    Initials Name Provider Type     Angeli Moore PTA Physical Therapy Assistant        Physical Therapy Education     Title: PT OT SLP Therapies (Done)     Topic: Physical Therapy (Done)     Point: Mobility training (Done)     Learning Progress Summary           Patient Acceptance, E,TB,D, VU,DU by  at 10/12/2019  1:06 PM    Eager, E,TB, VU,DU by AE at 10/11/2019  2:09 PM                   Point: Home exercise program (Done)     Learning Progress Summary           Patient Acceptance, E,TB,D, VU,DU by  at 10/12/2019  1:06 PM    Eager, E,TB, VU,DU by AE at 10/11/2019  2:09 PM                   Point: Body mechanics (Done)     Learning Progress Summary           Patient Acceptance, E,TB,D, VU,DU by  at 10/12/2019  1:06 PM    Eager, E,TB, VU,DU by AE at 10/11/2019  2:09 PM                   Point: Precautions (Done)     Learning Progress Summary           Patient Acceptance, E,TB,D, VU,DU by  at 10/12/2019  1:06 PM    Eager, E,TB, VU,DU by AE at 10/11/2019  2:09 PM                               User Key     Initials Effective Dates Name Provider Type [facility] 08/19/18 -  Angeli Moore PTA Physical Therapy Assistant PT    AE 09/04/19 -  Kiana Dimas PT Physical Therapist PT              PT Recommendation and Plan     Plan of Care Reviewed With: patient  Progress: improving  Outcome Summary: Pt tolerated treatment  with c/o pain. Pt is progressing well with increased activity tolerance. Pt ambulated 80 feet with rwx, CGA. Pt demo understanding of ascend/descend 4 steps with CGA.      Time Calculation:   PT Charges     Row Name 10/12/19 1304             Time Calculation    Start Time  0832  -      Stop Time  0919  -      Time Calculation (min)  47 min  -      PT Received On  10/12/19  -      PT - Next Appointment  10/13/19  -         Time Calculation- PT    Total Timed Code Minutes- PT  35 minute(s)  -        User Key  (r) = Recorded By, (t) = Taken By, (c) = Cosigned By    Initials Name Provider Type     Angeli Moore PTA Physical Therapy Assistant        Therapy Charges for Today     Code Description Service Date Service Provider Modifiers Qty    79839389288 HC PT THER PROC EA 15 MIN 10/12/2019 Angeli Moore PTA GP 1    09413198056 HC PT THER PROC GROUP 10/12/2019 Angeli Moore PTA GP 1          PT G-Codes  Outcome Measure Options: AM-PAC 6 Clicks Basic Mobility (PT)  AM-PAC 6 Clicks Score (PT): 19    Angeli Moore PTA  10/12/2019

## 2019-10-12 NOTE — PLAN OF CARE
Problem: Patient Care Overview  Goal: Plan of Care Review  Outcome: Ongoing (interventions implemented as appropriate)   10/12/19 8784   OTHER   Outcome Summary VSS, unable to wean off 02, po pain meds effective, ambulating well, voiding, anticipating discharge home in am.     Goal: Individualization and Mutuality  Outcome: Ongoing (interventions implemented as appropriate)    Goal: Discharge Needs Assessment  Outcome: Ongoing (interventions implemented as appropriate)      Problem: Fall Risk (Adult)  Goal: Absence of Fall  Outcome: Ongoing (interventions implemented as appropriate)      Problem: Knee Arthroplasty (Total, Partial) (Adult)  Goal: Signs and Symptoms of Listed Potential Problems Will be Absent, Minimized or Managed (Knee Arthroplasty)  Outcome: Ongoing (interventions implemented as appropriate)

## 2019-10-12 NOTE — PLAN OF CARE
Problem: Patient Care Overview  Goal: Plan of Care Review  Outcome: Ongoing (interventions implemented as appropriate)   10/12/19 3969   OTHER   Outcome Summary Pt tolerated treatment with c/o pain. Pt is progressing well with increased activity tolerance. Pt ambulated 80 feet with rwx, CGA. Pt demo understanding of ascend/descend 4 steps with CGA.    Coping/Psychosocial   Plan of Care Reviewed With patient   Plan of Care Review   Progress improving

## 2019-10-12 NOTE — PROGRESS NOTES
"DAILY PROGRESS NOTE  [facility]    Patient Identification:  Name: Moriah Weeks  Age: 59 y.o.  Sex: female  :  1960  MRN: [id number]         Primary Care Physician: Eric Lemos MD    Subjective:  Interval History:She complains of pain.    Objective:    Scheduled Meds:  aspirin 81 mg Oral Q12H   citalopram 20 mg Oral QAM   docusate sodium 100 mg Oral BID   influenza vaccine 0.5 mL Intramuscular Once   lisinopril 5 mg Oral Q24H   meloxicam 15 mg Oral QAM     Continuous Infusions:  sodium chloride 100 mL/hr       Vital signs in last 24 hours:  Temp:  [97.4 °F (36.3 °C)-98.6 °F (37 °C)] 98 °F (36.7 °C)  Heart Rate:  [] 86  Resp:  [16] 16  BP: (116-150)/() 119/78    Intake/Output:    Intake/Output Summary (Last 24 hours) at 10/12/2019 1027  Last data filed at 10/12/2019 0700  Gross per 24 hour   Intake 958 ml   Output --   Net 958 ml       Exam:  /78 (BP Location: Right arm, Patient Position: Lying)   Pulse 86   Temp 98 °F (36.7 °C) (Oral)   Resp 16   Ht 154.9 cm (61\")   Wt 87.1 kg (192 lb 0.3 oz)   SpO2 97%   BMI 36.28 kg/m²     General Appearance:    Alert, cooperative, no distress   Head:    Normocephalic, without obvious abnormality, atraumatic   Eyes:       Throat:   Lips, tongue, gums normal   Neck:   Supple, symmetrical, trachea midline, no JVD   Lungs:     Clear to auscultation bilaterally, respirations unlabored   Chest Wall:    No tenderness or deformity    Heart:    Regular rate and rhythm, S1 and S2 normal, no murmur,no  Rub or gallop   Abdomen:     Soft, non-tender, bowel sounds active, no masses, no organomegaly    Extremities:   Extremities normal,left knee surgical changes, no cyanosis or edema   Pulses:      Skin:   Skin is warm and dry,  no rashes or palpable lesions   Neurologic:   no focal deficits noted      Lab Results (last 72 hours)     Procedure Component Value Units Date/Time    Basic Metabolic Panel [[id number]]  (Abnormal) Collected:  " 10/12/19 0425    Specimen:  Blood Updated:  10/12/19 0508     Glucose 124 mg/dL      BUN 12 mg/dL      Creatinine 0.68 mg/dL      Sodium 140 mmol/L      Potassium 4.3 mmol/L      Chloride 101 mmol/L      CO2 29.4 mmol/L      Calcium 8.8 mg/dL      eGFR Non African Amer 88 mL/min/1.73      BUN/Creatinine Ratio 17.6     Anion Gap 9.6 mmol/L     Narrative:       GFR Normal >60  Chronic Kidney Disease <60  Kidney Failure <15    CBC & Differential [[id number]] Collected:  10/12/19 0425    Specimen:  Blood Updated:  10/12/19 0445    Narrative:       The following orders were created for panel order CBC & Differential.  Procedure                               Abnormality         Status                     ---------                               -----------         ------                     CBC Auto Differential[[id number]]        Abnormal            Final result                 Please view results for these tests on the individual orders.    CBC Auto Differential [[id number]]  (Abnormal) Collected:  10/12/19 0425    Specimen:  Blood Updated:  10/12/19 0445     WBC 10.42 10*3/mm3      RBC 4.03 10*6/mm3      Hemoglobin 12.3 g/dL      Hematocrit 36.2 %      MCV 89.8 fL      MCH 30.5 pg      MCHC 34.0 g/dL      RDW 12.3 %      RDW-SD 40.1 fl      MPV 11.6 fL      Platelets 200 10*3/mm3      Neutrophil % 80.7 %      Lymphocyte % 11.4 %      Monocyte % 7.2 %      Eosinophil % 0.0 %      Basophil % 0.1 %      Immature Grans % 0.6 %      Neutrophils, Absolute 8.41 10*3/mm3      Lymphocytes, Absolute 1.19 10*3/mm3      Monocytes, Absolute 0.75 10*3/mm3      Eosinophils, Absolute 0.00 10*3/mm3      Basophils, Absolute 0.01 10*3/mm3      Immature Grans, Absolute 0.06 10*3/mm3      nRBC 0.0 /100 WBC     SCANNED - LABS [[id number]] Resulted:  10/11/19      Updated:  10/10/19 0915    SCANNED - LABS [[id number]] Resulted:  10/11/19      Updated:  10/10/19 0907        Data Review:  Results from last 7 days   Lab Units 10/12/19  0425   SODIUM  mmol/L 140   POTASSIUM mmol/L 4.3   CHLORIDE mmol/L 101   CO2 mmol/L 29.4*   BUN mg/dL 12   CREATININE mg/dL 0.68   GLUCOSE mg/dL 124*   CALCIUM mg/dL 8.8     Results from last 7 days   Lab Units 10/12/19  0425   WBC 10*3/mm3 10.42   HEMOGLOBIN g/dL 12.3   HEMATOCRIT % 36.2   PLATELETS 10*3/mm3 200             No results found for: TROPONINT            Invalid input(s): PROT, LABALBU          No results found for: POCGLU        Past Medical History:   Diagnosis Date   • Anxiety and depression    • Arthritis    • Hypertension    • Knee pain, left    • Sleep apnea     DOES NOT WEAR CPAP        Assessment:  Active Hospital Problems    Diagnosis  POA   • **Status post left knee replacement [Z96.652]  Not Applicable   • Anxiety and depression [F41.9, F32.9]  Unknown   • Arthritis [M19.90]  Unknown   • Hypertension [I10]  Unknown   • Sleep apnea [G47.30]  Unknown      Resolved Hospital Problems    Diagnosis Date Resolved POA   • Osteoarthritis, knee [M17.10] 10/11/2019 Yes   • Primary osteoarthritis of left knee [M17.12] 10/11/2019 Yes       Plan:  Stable with currrent RX. OK for DC.    Pastor Charles MD  10/12/2019  10:27 AM

## 2019-10-14 NOTE — PROGRESS NOTES
Case Management Discharge Note    Final Note: Home with [facility]    Destination      No service has been selected for the patient.      Durable Medical Equipment      No service has been selected for the patient.      Dialysis/Infusion      No service has been selected for the patient.      Home Medical Care - Selection Complete      Service Provider Request Status Selected Services Address Phone Number Fax Number    [facility] Selected Home Health Services [address] [address] [phone]      Therapy      No service has been selected for the patient.      Community Resources      No service has been selected for the patient.        Transportation Services  Other: Other    Final Discharge Disposition Code: 06 - home with home health care

## 2019-10-15 ENCOUNTER — HOSPITAL ENCOUNTER (INPATIENT)
Facility: HOSPITAL | Age: 59
LOS: 3 days | Discharge: HOME HEALTH | End: 2019-10-18
Attending: EMERGENCY MEDICINE | Admitting: HOSPITALIST

## 2019-10-15 ENCOUNTER — APPOINTMENT (OUTPATIENT)
Dept: GENERAL RADIOLOGY | Facility: HOSPITAL | Age: 59
End: 2019-10-15

## 2019-10-15 ENCOUNTER — APPOINTMENT (OUTPATIENT)
Dept: CT IMAGING | Facility: HOSPITAL | Age: 59
End: 2019-10-15

## 2019-10-15 DIAGNOSIS — R09.02 HYPOXIA: Primary | ICD-10-CM

## 2019-10-15 DIAGNOSIS — G47.33 OBSTRUCTIVE SLEEP APNEA SYNDROME: ICD-10-CM

## 2019-10-15 DIAGNOSIS — G47.33 OSA (OBSTRUCTIVE SLEEP APNEA): ICD-10-CM

## 2019-10-15 PROBLEM — K59.00 CONSTIPATION: Status: ACTIVE | Noted: 2019-10-15

## 2019-10-15 LAB
ALBUMIN SERPL-MCNC: 4.2 G/DL (ref 3.5–5.2)
ALBUMIN/GLOB SERPL: 1.6 G/DL
ALP SERPL-CCNC: 69 U/L (ref 39–117)
ALT SERPL W P-5'-P-CCNC: 21 U/L (ref 1–33)
ANION GAP SERPL CALCULATED.3IONS-SCNC: 12.3 MMOL/L (ref 5–15)
ARTERIAL PATENCY WRIST A: ABNORMAL
AST SERPL-CCNC: 17 U/L (ref 1–32)
ATMOSPHERIC PRESS: 742.9 MMHG
BASE EXCESS BLDA CALC-SCNC: 8.5 MMOL/L (ref 0–2)
BASOPHILS # BLD AUTO: 0.07 10*3/MM3 (ref 0–0.2)
BASOPHILS NFR BLD AUTO: 0.6 % (ref 0–1.5)
BDY SITE: ABNORMAL
BILIRUB SERPL-MCNC: 0.3 MG/DL (ref 0.2–1.2)
BUN BLD-MCNC: 20 MG/DL (ref 8–23)
BUN/CREAT SERPL: 29 (ref 7–25)
CALCIUM SPEC-SCNC: 9 MG/DL (ref 8.6–10.5)
CHLORIDE SERPL-SCNC: 98 MMOL/L (ref 98–107)
CO2 SERPL-SCNC: 33.7 MMOL/L (ref 22–29)
CREAT BLD-MCNC: 0.69 MG/DL (ref 0.57–1)
DEPRECATED RDW RBC AUTO: 41.5 FL (ref 37–54)
EOSINOPHIL # BLD AUTO: 0.14 10*3/MM3 (ref 0–0.4)
EOSINOPHIL NFR BLD AUTO: 1.2 % (ref 0.3–6.2)
ERYTHROCYTE [DISTWIDTH] IN BLOOD BY AUTOMATED COUNT: 12.9 % (ref 12.3–15.4)
GAS FLOW AIRWAY: 2 LPM
GFR SERPL CREATININE-BSD FRML MDRD: 87 ML/MIN/1.73
GLOBULIN UR ELPH-MCNC: 2.7 GM/DL
GLUCOSE BLD-MCNC: 108 MG/DL (ref 65–99)
HCO3 BLDA-SCNC: 35.6 MMOL/L (ref 22–28)
HCT VFR BLD AUTO: 39.5 % (ref 34–46.6)
HGB BLD-MCNC: 13.3 G/DL (ref 12–15.9)
IMM GRANULOCYTES # BLD AUTO: 0.08 10*3/MM3 (ref 0–0.05)
IMM GRANULOCYTES NFR BLD AUTO: 0.7 % (ref 0–0.5)
LYMPHOCYTES # BLD AUTO: 1.73 10*3/MM3 (ref 0.7–3.1)
LYMPHOCYTES NFR BLD AUTO: 15.2 % (ref 19.6–45.3)
MCH RBC QN AUTO: 30 PG (ref 26.6–33)
MCHC RBC AUTO-ENTMCNC: 33.7 G/DL (ref 31.5–35.7)
MCV RBC AUTO: 89.2 FL (ref 79–97)
MODALITY: ABNORMAL
MONOCYTES # BLD AUTO: 0.65 10*3/MM3 (ref 0.1–0.9)
MONOCYTES NFR BLD AUTO: 5.7 % (ref 5–12)
NEUTROPHILS # BLD AUTO: 8.68 10*3/MM3 (ref 1.7–7)
NEUTROPHILS NFR BLD AUTO: 76.6 % (ref 42.7–76)
NRBC BLD AUTO-RTO: 0.4 /100 WBC (ref 0–0.2)
PCO2 BLDA: 59.9 MM HG (ref 35–45)
PH BLDA: 7.38 PH UNITS (ref 7.35–7.45)
PLATELET # BLD AUTO: 256 10*3/MM3 (ref 140–450)
PMV BLD AUTO: 11.6 FL (ref 6–12)
PO2 BLDA: 63.3 MM HG (ref 80–100)
POTASSIUM BLD-SCNC: 3.7 MMOL/L (ref 3.5–5.2)
PROT SERPL-MCNC: 6.9 G/DL (ref 6–8.5)
RBC # BLD AUTO: 4.43 10*6/MM3 (ref 3.77–5.28)
SAO2 % BLDCOA: 90.6 % (ref 92–99)
SODIUM BLD-SCNC: 144 MMOL/L (ref 136–145)
TOTAL RATE: 20 BREATHS/MINUTE
TROPONIN T SERPL-MCNC: <0.01 NG/ML (ref 0–0.03)
WBC NRBC COR # BLD: 11.35 10*3/MM3 (ref 3.4–10.8)

## 2019-10-15 PROCEDURE — 99284 EMERGENCY DEPT VISIT MOD MDM: CPT

## 2019-10-15 PROCEDURE — G0378 HOSPITAL OBSERVATION PER HR: HCPCS

## 2019-10-15 PROCEDURE — 0 IOPAMIDOL PER 1 ML: Performed by: NURSE PRACTITIONER

## 2019-10-15 PROCEDURE — 93005 ELECTROCARDIOGRAM TRACING: CPT | Performed by: NURSE PRACTITIONER

## 2019-10-15 PROCEDURE — 94799 UNLISTED PULMONARY SVC/PX: CPT

## 2019-10-15 PROCEDURE — 80053 COMPREHEN METABOLIC PANEL: CPT | Performed by: NURSE PRACTITIONER

## 2019-10-15 PROCEDURE — 71045 X-RAY EXAM CHEST 1 VIEW: CPT

## 2019-10-15 PROCEDURE — 25010000002 ENOXAPARIN PER 10 MG: Performed by: HOSPITALIST

## 2019-10-15 PROCEDURE — 82803 BLOOD GASES ANY COMBINATION: CPT

## 2019-10-15 PROCEDURE — 93010 ELECTROCARDIOGRAM REPORT: CPT | Performed by: INTERNAL MEDICINE

## 2019-10-15 PROCEDURE — 94640 AIRWAY INHALATION TREATMENT: CPT

## 2019-10-15 PROCEDURE — 85025 COMPLETE CBC W/AUTO DIFF WBC: CPT | Performed by: NURSE PRACTITIONER

## 2019-10-15 PROCEDURE — 84484 ASSAY OF TROPONIN QUANT: CPT | Performed by: NURSE PRACTITIONER

## 2019-10-15 PROCEDURE — 36600 WITHDRAWAL OF ARTERIAL BLOOD: CPT

## 2019-10-15 PROCEDURE — 71275 CT ANGIOGRAPHY CHEST: CPT

## 2019-10-15 RX ORDER — IPRATROPIUM BROMIDE AND ALBUTEROL SULFATE 2.5; .5 MG/3ML; MG/3ML
3 SOLUTION RESPIRATORY (INHALATION) ONCE
Status: COMPLETED | OUTPATIENT
Start: 2019-10-15 | End: 2019-10-15

## 2019-10-15 RX ORDER — ACETAMINOPHEN 650 MG/1
650 SUPPOSITORY RECTAL EVERY 4 HOURS PRN
Status: DISCONTINUED | OUTPATIENT
Start: 2019-10-15 | End: 2019-10-16

## 2019-10-15 RX ORDER — MAGNESIUM CARB/ALUMINUM HYDROX 105-160MG
296 TABLET,CHEWABLE ORAL ONCE
Status: COMPLETED | OUTPATIENT
Start: 2019-10-15 | End: 2019-10-15

## 2019-10-15 RX ORDER — BISACODYL 5 MG/1
10 TABLET, DELAYED RELEASE ORAL DAILY PRN
Status: DISCONTINUED | OUTPATIENT
Start: 2019-10-15 | End: 2019-10-18 | Stop reason: HOSPADM

## 2019-10-15 RX ORDER — SODIUM CHLORIDE 0.9 % (FLUSH) 0.9 %
10 SYRINGE (ML) INJECTION AS NEEDED
Status: DISCONTINUED | OUTPATIENT
Start: 2019-10-15 | End: 2019-10-18 | Stop reason: HOSPADM

## 2019-10-15 RX ORDER — DOCUSATE SODIUM 100 MG/1
100 CAPSULE, LIQUID FILLED ORAL 2 TIMES DAILY
Status: DISCONTINUED | OUTPATIENT
Start: 2019-10-15 | End: 2019-10-18 | Stop reason: HOSPADM

## 2019-10-15 RX ORDER — HYDROCODONE BITARTRATE AND ACETAMINOPHEN 7.5; 325 MG/1; MG/1
1 TABLET ORAL EVERY 4 HOURS PRN
Status: DISCONTINUED | OUTPATIENT
Start: 2019-10-15 | End: 2019-10-16

## 2019-10-15 RX ORDER — ONDANSETRON 2 MG/ML
4 INJECTION INTRAMUSCULAR; INTRAVENOUS EVERY 6 HOURS PRN
Status: DISCONTINUED | OUTPATIENT
Start: 2019-10-15 | End: 2019-10-18 | Stop reason: HOSPADM

## 2019-10-15 RX ORDER — IPRATROPIUM BROMIDE AND ALBUTEROL SULFATE 2.5; .5 MG/3ML; MG/3ML
3 SOLUTION RESPIRATORY (INHALATION) EVERY 4 HOURS PRN
Status: DISCONTINUED | OUTPATIENT
Start: 2019-10-15 | End: 2019-10-18 | Stop reason: HOSPADM

## 2019-10-15 RX ORDER — ASPIRIN 81 MG/1
81 TABLET ORAL DAILY
Status: DISCONTINUED | OUTPATIENT
Start: 2019-10-16 | End: 2019-10-18 | Stop reason: HOSPADM

## 2019-10-15 RX ORDER — HYDROCODONE BITARTRATE AND ACETAMINOPHEN 7.5; 325 MG/1; MG/1
1 TABLET ORAL ONCE
Status: COMPLETED | OUTPATIENT
Start: 2019-10-15 | End: 2019-10-15

## 2019-10-15 RX ORDER — SODIUM CHLORIDE 0.9 % (FLUSH) 0.9 %
10 SYRINGE (ML) INJECTION EVERY 12 HOURS SCHEDULED
Status: DISCONTINUED | OUTPATIENT
Start: 2019-10-15 | End: 2019-10-18 | Stop reason: HOSPADM

## 2019-10-15 RX ORDER — ACETAMINOPHEN 160 MG/5ML
650 SOLUTION ORAL EVERY 4 HOURS PRN
Status: DISCONTINUED | OUTPATIENT
Start: 2019-10-15 | End: 2019-10-16

## 2019-10-15 RX ORDER — CITALOPRAM 20 MG/1
20 TABLET ORAL EVERY MORNING
Status: DISCONTINUED | OUTPATIENT
Start: 2019-10-16 | End: 2019-10-18 | Stop reason: HOSPADM

## 2019-10-15 RX ORDER — ONDANSETRON 4 MG/1
4 TABLET, FILM COATED ORAL EVERY 6 HOURS PRN
Status: DISCONTINUED | OUTPATIENT
Start: 2019-10-15 | End: 2019-10-18 | Stop reason: HOSPADM

## 2019-10-15 RX ORDER — ACETAMINOPHEN 325 MG/1
650 TABLET ORAL EVERY 4 HOURS PRN
Status: DISCONTINUED | OUTPATIENT
Start: 2019-10-15 | End: 2019-10-16

## 2019-10-15 RX ORDER — LISINOPRIL 40 MG/1
40 TABLET ORAL
Status: DISCONTINUED | OUTPATIENT
Start: 2019-10-16 | End: 2019-10-18 | Stop reason: HOSPADM

## 2019-10-15 RX ADMIN — ENOXAPARIN SODIUM 40 MG: 40 INJECTION SUBCUTANEOUS at 21:56

## 2019-10-15 RX ADMIN — HYDROCODONE BITARTRATE AND ACETAMINOPHEN 1 TABLET: 7.5; 325 TABLET ORAL at 21:56

## 2019-10-15 RX ADMIN — SODIUM CHLORIDE, PRESERVATIVE FREE 10 ML: 5 INJECTION INTRAVENOUS at 21:58

## 2019-10-15 RX ADMIN — IOPAMIDOL 95 ML: 755 INJECTION, SOLUTION INTRAVENOUS at 17:15

## 2019-10-15 RX ADMIN — Medication 296 ML: at 21:58

## 2019-10-15 RX ADMIN — DOCUSATE SODIUM 100 MG: 100 CAPSULE, LIQUID FILLED ORAL at 21:56

## 2019-10-15 RX ADMIN — HYDROCODONE BITARTRATE AND ACETAMINOPHEN 1 TABLET: 7.5; 325 TABLET ORAL at 18:46

## 2019-10-15 RX ADMIN — IPRATROPIUM BROMIDE AND ALBUTEROL SULFATE 3 ML: 2.5; .5 SOLUTION RESPIRATORY (INHALATION) at 18:37

## 2019-10-15 NOTE — H&P
HISTORY AND PHYSICAL   [facility]        Patient Identification:  Name: Moriah Weeks  Age: 59 y.o.  Sex: female  :  1960  MRN: [id number]                     Primary Care Physician: Eric Lemos MD    Chief Complaint: Short of air    History of Present Illness:        The patient is a 59-year-old white female with history of anxiety, depression, arthritis, hypertension, sleep apnea who is noncompliant with CPAP and history of recent left total knee replacement who was admitted with complaints of increasing shortness of air.  The patient was doing physical therapy for her recent left total knee replacement and her therapist noticed she was short of air and noted that her oxygen saturation was low.  She was told to go to the emergency room for further evaluation of this.  The patient was evaluated in ER and had CT of chest PE protocol which was negative for pulmonary emboli or any other pathology.  She did get a nebulizer treatment and did help some.  The patient's also complained of being very constipated and has not had a bowel movement for several days.  She has had some abdominal bloating.  She has not had any nausea or vomiting.  She denies any cough or cold symptoms.  She has not had any fever or chills.  She denies having any chest pain.  The patient was started on some oxygen at about 2 L and was feeling better with her sats above 90.  The patient is admitted for further evaluation treatment of her symptoms.    Past Medical History:  Past Medical History:   Diagnosis Date   • Anxiety and depression    • Arthritis    • Hypertension    • Knee pain, left    • Sleep apnea     DOES NOT WEAR CPAP      Past Surgical History:  Past Surgical History:   Procedure Laterality Date   • CARPAL TUNNEL RELEASE Right    • CARPAL TUNNEL RELEASE Left    • COLONOSCOPY     • ENDOMETRIAL ABLATION     • HYSTERECTOMY     • KNEE ARTHROSCOPY Left 2019    Procedure: LEFT KNEE ARTHROSCOPY WITH PARTIAL  MEDIAL MENISCETOMY, CHONDROPLASTY AND INTERNAL FIXATION TIBIAL PLATEAU INSUFFICIENCY FRACTURE;  Surgeon: Aaron Fleming MD;  Location: [facility] OR [facility] [place];  Service: Orthopedics      Home Meds:    (Not in a hospital admission)  Current meds  No current facility-administered medications for this encounter.     Current Outpatient Medications:   •  aspirin 81 MG EC tablet, Take 1 tablet by mouth Every 12 (Twelve) Hours for 41 doses., Disp: 41 tablet, Rfl: 0  •  benazepril (LOTENSIN) 40 MG tablet, Take 40 mg by mouth Every Morning., Disp: , Rfl:   •  bisacodyl (DULCOLAX) 5 MG EC tablet, Take 2 tablets by mouth Daily As Needed for Constipation., Disp: 10 tablet, Rfl: 0  •  citalopram (CeleXA) 20 MG tablet, Take 20 mg by mouth Every Morning., Disp: , Rfl:   •  docusate sodium (COLACE) 100 MG capsule, Take 1 capsule by mouth 2 (Two) Times a Day for 28 doses., Disp: 28 capsule, Rfl: 0  •  HYDROcodone-acetaminophen (NORCO) 7.5-325 MG per tablet, Take 1 tablet by mouth Every 4 (Four) Hours As Needed for Moderate Pain  for up to 9 days., Disp: 42 tablet, Rfl: 0  •  acetaminophen (TYLENOL) 500 MG tablet, Take 500 mg by mouth Every 6 (Six) Hours As Needed for Mild Pain ., Disp: , Rfl:   •  meloxicam (MOBIC) 15 MG tablet, Take 15 mg by mouth Every Morning. HELD FOR OR, Disp: , Rfl:   •  ondansetron (ZOFRAN) 4 MG tablet, Take 1 tablet by mouth Every 6 (Six) Hours As Needed for Nausea or Vomiting., Disp: 30 tablet, Rfl: 0  Allergies:  No Known Allergies  Immunizations:  Immunization History   Administered Date(s) Administered   • FLUARIX/FLUZONE/AFLURIA/FLULAVAL QUAD 10/12/2019     Social History:   Social History     Social History Narrative   • Not on file     Social History     Socioeconomic History   • Marital status:      Spouse name: Not on file   • Number of children: Not on file   • Years of education: Not on file   • Highest education level: Not on file   Tobacco Use   • Smoking status: Former Smoker     Years: 10.00  "    Types: Cigarettes     Last attempt to quit: 2004     Years since quitting: 15.7   • Smokeless tobacco: Never Used   • Tobacco comment: 2-3 CIGS PER DAY    Substance and Sexual Activity   • Alcohol use: Yes     Comment: RARE   • Drug use: Yes     Types: Marijuana     Comment: DAILY    • Sexual activity: Defer       Family History:  Family History   Problem Relation Age of Onset   • Hypertension Mother    • Hypertension Father    • Malig Hyperthermia Neg Hx         Review of Systems  See history of present illness and past medical history.  Patient denies headache, dizziness, syncope, falls, trauma, change in vision, change in hearing, change in taste, changes in weight, changes in appetite, focal weakness, numbness, or paresthesia.  Patient denies chest pain, palpitations,  orthopnea, PND, cough, sinus pressure, rhinorrhea, epistaxis, hemoptysis, nausea, vomiting, hematemesis, diarrhea,  or hematchezia.  Denies cold or heat intolerance, polydipsia, polyuria, polyphagia. Denies hematuria, pyuria, dysuria, hesitancy, frequency or urgency.  Denies fever, chills, sweats, night sweats.   Remainder of ROS is negative.    Objective:  tMax 24 hrs: Temp (24hrs), Av.9 °F (36.1 °C), Min:96.9 °F (36.1 °C), Max:96.9 °F (36.1 °C)    Vitals Ranges:   Temp:  [96.9 °F (36.1 °C)] 96.9 °F (36.1 °C)  Heart Rate:  [] 80  Resp:  [18-20] 20  BP: (118-147)/() 128/79      Exam:  /79   Pulse 80   Temp 96.9 °F (36.1 °C) (Tympanic)   Resp 20   Ht 154.9 cm (61\")   Wt 83.9 kg (185 lb)   SpO2 92%   BMI 34.96 kg/m²     General Appearance:    Alert, cooperative, no distress, appears stated age   Head:    Normocephalic, without obvious abnormality, atraumatic   Eyes:    PERRL, conjunctiva/corneas clear, EOM's intact, both eyes   Ears:    Normal external ear canals, both ears   Nose:   Nares normal, septum midline, mucosa normal, no drainage    or sinus tenderness   Throat:   Lips, mucosa, and tongue normal   Neck:   " Supple, symmetrical, trachea midline, no adenopathy;     thyroid:  no enlargement/tenderness/nodules; no carotid    bruit or JVD   Back:     Symmetric, no curvature, ROM normal, no CVA tenderness   Lungs:     Clear to auscultation bilaterally, respirations unlabored   Chest Wall:    No tenderness or deformity    Heart:    Regular rate and rhythm, S1 and S2 normal, no murmur, rub   or gallop   Abdomen:     Soft, non-tender, bowel sounds active all four quadrants,     no masses, no hepatomegaly, no splenomegaly   Extremities:   Extremities normal, atraumatic, no cyanosis or edema   Pulses:   2+ and symmetric all extremities   Skin:   Skin color, texture, turgor normal, no rashes or lesions   Lymph nodes:   Cervical, supraclavicular, and axillary nodes normal   Neurologic:   CNII-XII intact, normal strength, sensation intact throughout      .    Data Review:  Lab Results (last 72 hours)     Procedure Component Value Units Date/Time    Blood Gas, Arterial [[id number]]  (Abnormal) Collected:  10/15/19 1812    Specimen:  Arterial Blood Updated:  10/15/19 1816     Site Arterial: right brachial     [employer] Test N/A     pH, Arterial 7.382 pH units      pCO2, Arterial 59.9 mm Hg      Comment: Critical:Notify RN JUANCHO HENDRICKS RN (15-Oct-19 18:15:29)Read back ok        pO2, Arterial 63.3 mm Hg      HCO3, Arterial 35.6 mmol/L      Base Excess, Arterial 8.5 mmol/L      O2 Saturation Calculated 90.6 %      Barometric Pressure for Blood Gas 742.9 mmHg      Modality Cannula     Flow Rate 2 lpm      Rate 20 Breaths/minute     Comprehensive Metabolic Panel [[id number]]  (Abnormal) Collected:  10/15/19 1624    Specimen:  Blood Updated:  10/15/19 1659     Glucose 108 mg/dL      BUN 20 mg/dL      Creatinine 0.69 mg/dL      Sodium 144 mmol/L      Potassium 3.7 mmol/L      Chloride 98 mmol/L      CO2 33.7 mmol/L      Calcium 9.0 mg/dL      Total Protein 6.9 g/dL      Albumin 4.20 g/dL      ALT (SGPT) 21 U/L      AST (SGOT) 17 U/L       Alkaline Phosphatase 69 U/L      Total Bilirubin 0.3 mg/dL      eGFR Non African Amer 87 mL/min/1.73      Globulin 2.7 gm/dL      A/G Ratio 1.6 g/dL      BUN/Creatinine Ratio 29.0     Anion Gap 12.3 mmol/L     Narrative:       GFR Normal >60  Chronic Kidney Disease <60  Kidney Failure <15    Troponin [[id number]]  (Normal) Collected:  10/15/19 1624    Specimen:  Blood Updated:  10/15/19 1656     Troponin T <0.010 ng/mL     Narrative:       Troponin T Reference Range:  <= 0.03 ng/mL-   Negative for AMI  >0.03 ng/mL-     Abnormal for myocardial necrosis.  Clinicians would have to utilize clinical acumen, EKG, Troponin and serial changes to determine if it is an Acute Myocardial Infarction or myocardial injury due to an underlying chronic condition.     CBC & Differential [[id number]] Collected:  10/15/19 1624    Specimen:  Blood Updated:  10/15/19 1635    Narrative:       The following orders were created for panel order CBC & Differential.  Procedure                               Abnormality         Status                     ---------                               -----------         ------                     CBC Auto Differential[[id number]]        Abnormal            Final result                 Please view results for these tests on the individual orders.    CBC Auto Differential [[id number]]  (Abnormal) Collected:  10/15/19 1624    Specimen:  Blood Updated:  10/15/19 1635     WBC 11.35 10*3/mm3      RBC 4.43 10*6/mm3      Hemoglobin 13.3 g/dL      Hematocrit 39.5 %      MCV 89.2 fL      MCH 30.0 pg      MCHC 33.7 g/dL      RDW 12.9 %      RDW-SD 41.5 fl      MPV 11.6 fL      Platelets 256 10*3/mm3      Neutrophil % 76.6 %      Lymphocyte % 15.2 %      Monocyte % 5.7 %      Eosinophil % 1.2 %      Basophil % 0.6 %      Immature Grans % 0.7 %      Neutrophils, Absolute 8.68 10*3/mm3      Lymphocytes, Absolute 1.73 10*3/mm3      Monocytes, Absolute 0.65 10*3/mm3      Eosinophils, Absolute 0.14 10*3/mm3      Basophils,  Absolute 0.07 10*3/mm3      Immature Grans, Absolute 0.08 10*3/mm3      nRBC 0.4 /100 WBC                    Imaging Results (all)     Procedure Component Value Units Date/Time    CT Angiogram Chest [[id number]] Collected:  10/15/19 1752     Updated:  10/15/19 1837    Narrative:       CT ANGIOGRAPHY OF THE CHEST WITH INTRAVENOUS CONTRAST AND 3-D  RECONSTRUCTIONS     HISTORY: Shortness of breath. Recent knee surgery.     FINDINGS: The CT scan was performed as an emergency procedure with CT  angiography protocol using intravenous contrast and 3-D reconstructions  and demonstrates the followin. The pulmonary arteries are well-opacified and there is no evidence of  pulmonary embolus. The thoracic aorta shows no evidence of aneurysm or  dissection.  2. The lungs are well-expanded and clear except for a calcified  granuloma in the left lower lobe. There is no mediastinal or hilar or  axillary adenopathy.  3. There may be a very tiny pericardial effusion. The CT images through  the upper liver, spleen, and both adrenal glands were unremarkable.                 Radiation dose reduction techniques were utilized, including automated  exposure control and exposure modulation based on body size.     This report was finalized on 10/15/2019 6:34 PM by Dr. Ronni Roberto M.D.       XR Chest 1 View [[id number]] Collected:  10/15/19 1735     Updated:  10/15/19 1739    Narrative:       PORTABLE CHEST 10/15/1920 4:23 PM     CLINICAL HISTORY: Dyspnea.     Compared to the previous chest x-ray dated 2019.     The lungs are well-expanded and appear free of focal infiltrates. There  are no pleural effusions. The heart is mildly enlarged and unchanged.  The pulmonary vasculature is within normal limits.     IMPRESSIONS: Cardiomegaly. No evidence of acute disease within the  chest.     This report was finalized on 10/15/2019 5:36 PM by Dr. Yifan Jensen M.D.           Past Medical History:   Diagnosis Date   • Anxiety and  depression    • Arthritis    • Hypertension    • Knee pain, left    • Sleep apnea     DOES NOT WEAR CPAP        Assessment:  Active Hospital Problems    Diagnosis  POA   • **Hypoxia [R09.02]  Yes   • Status post left knee replacement [Z96.652]  Not Applicable   • Anxiety and depression [F41.9, F32.9]  Yes   • Hypertension [I10]  Yes   • Sleep apnea [G47.30]  Yes      Resolved Hospital Problems   No resolved problems to display.       Plan:  The patient is admitted to the hospital and will continue with some oxygen and bronchodilator treatments.  We will give her some laxatives for constipation.  Will ask for pulmonary evaluation for further evaluation of her hypoxia.  She does smoke marijuana daily and I advised her that she should stop.    Pastor Charles MD  10/15/2019  7:53 PM

## 2019-10-15 NOTE — ED PROVIDER NOTES
Pt presents to the ED c/o  dyspnea on exertion that began today.  She had a left knee replacement on October 11, 2019 and has been getting physical therapy.  During physical therapy today her oxygen saturation went down to 75% on room air..  She also has a history of sleep apnea and she admits that she is noncompliant with the CPAP machine.  She denies any chest pain, fever, cough, calf pain or vomiting.  Denies any fever or chills.     On exam,   Her lungs are clear to auscultation bilaterally.  Her heart is regular rate and rhythm without any murmurs.  Her oropharynx is normal.  Her neck is supple without any lymphadenopathy.  Her abdomen is normoactive bowel sounds, soft, nontender nondistended.  Her legs her right leg is nontender to palpation.  On her left knee there is a dressing anteriorly that is clean dry and intact.  Her left calf is nontender to palpation.     Plan: Other evaluation to rule out a PE.     Attestation:  The FRAN[facility] and I have discussed this patient's history, physical exam, and treatment plan.  I have reviewed the documentation and personally had a face to face interaction with the patient. I affirm the documentation and agree with the treatment and plan.  The attached note describes my personal findings.    Dragon disclaimer:   Much of this encounter note is an electronic transcription/translation of spoken language to printed text.  The electronic translation of spoken language may permit erroneous, or at times, nonsensical words or phrases to be inadvertently transcribed.  Although I have reviewed the note for such areas, some may still exist.     Janusz Azul MD  10/15/19 [id number]

## 2019-10-15 NOTE — ED NOTES
Diet fabricio mist provided to pt per request. Okayed with ASHA Zheng. Pt resting in bed with NAD noted. Waiting for transport to take upstairs.     Colette Garcia, RN  10/15/19 1927

## 2019-10-15 NOTE — ED PROVIDER NOTES
"EMERGENCY DEPARTMENT ENCOUNTER    CHIEF COMPLAINT  Chief Complaint: shortness of breath  History given by: patient  History limited by: nothing  Time Seen: 1617  Room Number: S518/1  PMD: Eric Lemos MD      HPI:  Pt is a 59 y.o. female who underwent a total knee arthroplasty on the left on 10/11/2019 which was done by Dr. Juarez. The patient presents to the ED with mild dyspnea on exertion that began earlier today. The patient reports her physical therapist noticed her oxygen saturation was \"low\" earlier today and recommended that she come to the ER for further evaluation. The patient denies associated fever, chills, chest pain, back pain, cough, or flu symptoms. The patient denies hx of pulmonary issues. The patient denies wearing supplemental oxygen at home. The patient is not currently anticoagulated. She reports she takes an 81 mg ASA daily. There are no other complaints at this time.    The patient's oxygen saturation was 75% on RA while at rest in ED Triage. Her oxygen saturation improved to 99% when placed on 2L NC.    Duration: one day  Timing: constant  Location: respiratory  Quality: dyspnea  Intensity/Severity: moderate  Progression: not specified  Associated Symptoms: none specified  Aggravating Factors: exertion   Alleviating Factors: none specified  Previous Episodes: pt denies previous episodes in the past.   Treatment before arrival: none specified    PAST MEDICAL HISTORY  Active Ambulatory Problems     Diagnosis Date Noted   • Status post left knee replacement 10/11/2019   • Anxiety and depression 10/11/2019   • Arthritis 10/11/2019   • Hypertension 10/11/2019   • Sleep apnea 10/11/2019     Resolved Ambulatory Problems     Diagnosis Date Noted   • Primary osteoarthritis of left knee 10/10/2019   • Osteoarthritis, knee 10/11/2019     Past Medical History:   Diagnosis Date   • Anxiety and depression    • Arthritis    • Hypertension    • Knee pain, left    • Sleep apnea        PAST SURGICAL " HISTORY  Past Surgical History:   Procedure Laterality Date   • CARPAL TUNNEL RELEASE Right    • CARPAL TUNNEL RELEASE Left    • COLONOSCOPY     • ENDOMETRIAL ABLATION     • HYSTERECTOMY     • KNEE ARTHROSCOPY Left 4/17/2019    Procedure: LEFT KNEE ARTHROSCOPY WITH PARTIAL MEDIAL MENISCETOMY, CHONDROPLASTY AND INTERNAL FIXATION TIBIAL PLATEAU INSUFFICIENCY FRACTURE;  Surgeon: Aaron Fleming MD;  [place] [facility] [place] [facility] [place]  Service: [place]opedics       FAMILY HISTORY  Family History   Problem Relation Age of Onset   • Hypertension Mother    • Hypertension Father    • Malig Hyperthermia Neg Hx        SOCIAL HISTORY  Social History     Socioeconomic History   • Marital status:      Spouse name: Not on file   • Number of children: Not on file   • Years of education: Not on file   • Highest education level: Not on file   Tobacco Use   • Smoking status: Former Smoker     Years: 10.00     Types: Cigarettes     Last attempt to quit: 2004     Years since quitting: 15.7   • Smokeless tobacco: Never Used   • Tobacco comment: 2-3 CIGS PER DAY    Substance and Sexual Activity   • Alcohol use: Yes     Comment: RARE   • Drug use: Yes     Types: Marijuana     Comment: DAILY    • Sexual activity: Defer         ALLERGIES  Patient has no known allergies.    REVIEW OF SYSTEMS  Review of Systems   Constitutional: Negative.  Negative for activity change, appetite change ( decreased), chills, fatigue and fever.   HENT: Negative.  Negative for congestion, ear pain, rhinorrhea and sore throat.    Eyes: Negative.    Respiratory: Positive for shortness of breath. Negative for cough.    Cardiovascular: Negative.  Negative for chest pain, palpitations and leg swelling ( pedal).   Gastrointestinal: Negative.  Negative for abdominal pain, constipation, diarrhea, nausea and vomiting.   Endocrine: Negative.    Genitourinary: Negative.  Negative for decreased urine volume, difficulty urinating, dysuria, menstrual problem, pelvic pain, urgency  and vaginal discharge.   Musculoskeletal: Negative.  Negative for back pain.   Skin: Negative.  Negative for rash.   Allergic/Immunologic: Negative.    Neurological: Negative.  Negative for dizziness, weakness, light-headedness, numbness and headaches.   Hematological: Negative.    Psychiatric/Behavioral: Negative.  The patient is not nervous/anxious.    All other systems reviewed and are negative.      PHYSICAL EXAM  ED Triage Vitals [10/15/19 1610]   Temp Heart Rate Resp BP SpO2   96.9 °F (36.1 °C) 106 18 -- (!) 87 %      Temp src Heart Rate Source Patient Position BP Location FiO2 (%)   Tympanic Monitor -- -- --       Physical Exam   Constitutional: She is well-developed, well-nourished, and in no distress. No distress.   HENT:   Head: Normocephalic and atraumatic.   Mouth/Throat: Oropharynx is clear and moist and mucous membranes are normal.   Eyes: Pupils are equal, round, and reactive to light.   Neck: Normal range of motion.   Cardiovascular: Normal rate, regular rhythm and normal heart sounds.   Pulmonary/Chest: Effort normal and breath sounds normal. She has no wheezes.   Abdominal: Soft. Bowel sounds are normal. There is no tenderness.   Musculoskeletal: Normal range of motion. She exhibits no edema.   Neurological: She is alert.   Skin: Skin is warm and dry. No rash noted.   Psychiatric: Mood, memory, affect and judgment normal.   Nursing note and vitals reviewed.      LAB RESULTS  Recent Results (from the past 24 hour(s))   Comprehensive Metabolic Panel    Collection Time: 10/15/19  4:24 PM   Result Value Ref Range    Glucose 108 (H) 65 - 99 mg/dL    BUN 20 8 - 23 mg/dL    Creatinine 0.69 0.57 - 1.00 mg/dL    Sodium 144 136 - 145 mmol/L    Potassium 3.7 3.5 - 5.2 mmol/L    Chloride 98 98 - 107 mmol/L    CO2 33.7 (H) 22.0 - 29.0 mmol/L    Calcium 9.0 8.6 - 10.5 mg/dL    Total Protein 6.9 6.0 - 8.5 g/dL    Albumin 4.20 3.50 - 5.20 g/dL    ALT (SGPT) 21 1 - 33 U/L    AST (SGOT) 17 1 - 32 U/L    Alkaline  Phosphatase 69 39 - 117 U/L    Total Bilirubin 0.3 0.2 - 1.2 mg/dL    eGFR Non African Amer 87 >60 mL/min/1.73    Globulin 2.7 gm/dL    A/G Ratio 1.6 g/dL    BUN/Creatinine Ratio 29.0 (H) 7.0 - 25.0    Anion Gap 12.3 5.0 - 15.0 mmol/L   Troponin    Collection Time: 10/15/19  4:24 PM   Result Value Ref Range    Troponin T <0.010 0.000 - 0.030 ng/mL   CBC Auto Differential    Collection Time: 10/15/19  4:24 PM   Result Value Ref Range    WBC 11.35 (H) 3.40 - 10.80 10*3/mm3    RBC 4.43 3.77 - 5.28 10*6/mm3    Hemoglobin 13.3 12.0 - 15.9 g/dL    Hematocrit 39.5 34.0 - 46.6 %    MCV 89.2 79.0 - 97.0 fL    MCH 30.0 26.6 - 33.0 pg    MCHC 33.7 31.5 - 35.7 g/dL    RDW 12.9 12.3 - 15.4 %    RDW-SD 41.5 37.0 - 54.0 fl    MPV 11.6 6.0 - 12.0 fL    Platelets 256 140 - 450 10*3/mm3    Neutrophil % 76.6 (H) 42.7 - 76.0 %    Lymphocyte % 15.2 (L) 19.6 - 45.3 %    Monocyte % 5.7 5.0 - 12.0 %    Eosinophil % 1.2 0.3 - 6.2 %    Basophil % 0.6 0.0 - 1.5 %    Immature Grans % 0.7 (H) 0.0 - 0.5 %    Neutrophils, Absolute 8.68 (H) 1.70 - 7.00 10*3/mm3    Lymphocytes, Absolute 1.73 0.70 - 3.10 10*3/mm3    Monocytes, Absolute 0.65 0.10 - 0.90 10*3/mm3    Eosinophils, Absolute 0.14 0.00 - 0.40 10*3/mm3    Basophils, Absolute 0.07 0.00 - 0.20 10*3/mm3    Immature Grans, Absolute 0.08 (H) 0.00 - 0.05 10*3/mm3    nRBC 0.4 (H) 0.0 - 0.2 /100 WBC   Blood Gas, Arterial    Collection Time: 10/15/19  6:12 PM   Result Value Ref Range    Site Arterial: right brachial     Levi's Test N/A     pH, Arterial 7.382 7.350 - 7.450 pH units    pCO2, Arterial 59.9 (C) 35.0 - 45.0 mm Hg    pO2, Arterial 63.3 (L) 80.0 - 100.0 mm Hg    HCO3, Arterial 35.6 (H) 22.0 - 28.0 mmol/L    Base Excess, Arterial 8.5 (H) 0.0 - 2.0 mmol/L    O2 Saturation Calculated 90.6 (L) 92.0 - 99.0 %    Barometric Pressure for Blood Gas 742.9 mmHg    Modality Cannula     Flow Rate 2 lpm    Rate 20 Breaths/minute       I ordered the above labs and reviewed the results    RADIOLOGY  Xr  Chest 1 View    Result Date: 10/15/2019  Narrative: PORTABLE CHEST 10/15/1920 4:23 PM  CLINICAL HISTORY: Dyspnea.  Compared to the previous chest x-ray dated 2019.  The lungs are well-expanded and appear free of focal infiltrates. There are no pleural effusions. The heart is mildly enlarged and unchanged. The pulmonary vasculature is within normal limits.  IMPRESSIONS: Cardiomegaly. No evidence of acute disease within the chest.  This report was finalized on 10/15/2019 5:36 PM by Dr. Yifan Jensen M.D.      Ct Angiogram Chest    Result Date: 10/15/2019  Narrative: CT ANGIOGRAPHY OF THE CHEST WITH INTRAVENOUS CONTRAST AND 3-D RECONSTRUCTIONS  HISTORY: Shortness of breath. Recent knee surgery.  FINDINGS: The CT scan was performed as an emergency procedure with CT angiography protocol using intravenous contrast and 3-D reconstructions and demonstrates the followin. The pulmonary arteries are well-opacified and there is no evidence of pulmonary embolus. The thoracic aorta shows no evidence of aneurysm or dissection. 2. The lungs are well-expanded and clear except for a calcified granuloma in the left lower lobe. There is no mediastinal or hilar or axillary adenopathy. 3. There may be a very tiny pericardial effusion. The CT images through the upper liver, spleen, and both adrenal glands were unremarkable.      Radiation dose reduction techniques were utilized, including automated exposure control and exposure modulation based on body size.         I ordered the above noted radiological studies and reviewed the images on the PACS system.  Spoke with Dr. Roberto (Radiology) regarding CT scan results        EKG    ekg was interpreted by Dr. Azul      MEDICAL RECORD REVIEW  The patient underwent a total knee arthroplasty on the left on 10/11/2019 which was done by Dr. Juarez.      PROGRESS AND CONSULTS   Received a call from Dr. Roberto (Radiology) and discussed pt's case. Dr. Roberto stated the CTA  Chest is negative acute.    1755 Rechecked the patient who is resting comfortably and in NAD. Patient is stable. BP- (!) 147/106 HR- 106 Temp- 96.9 °F (36.1 °C) (Tympanic) O2 sat- 89% on 2L NC. Informed the patient of her negative acute CTA Chest and CXR. Notified the patient of her unremarkable blood work. Due to the patient's low oxygen saturation, discussed the plan for admission for further evaluation and management. Pt understands and agrees with the plan, all questions answered.    1815 Reviewed pt's history and workup with Dr. Azul.   After a bedside evaluation; Dr. Azul agrees with the plan of care.    1823 Received a call from Dr. Pastor Charles ([facility]) and discussed pt's case. Dr. Pastor Charles agreed with plan to admit the patient to tele obs for further evaluation and management.    COURSE & MEDICAL DECISION MAKING  Pertinent Labs and Imaging studies that were ordered and reviewed are noted above.  Results were reviewed/discussed with the patient and they were also made aware of online assess.   Pt also made aware that some labs, such as cultures, will not be resulted during ER visit and follow up with PMD is necessary.     MEDICATIONS GIVEN IN ER  Medications   aspirin EC tablet 81 mg (not administered)   lisinopril (PRINIVIL,ZESTRIL) tablet 40 mg (not administered)   bisacodyl (DULCOLAX) EC tablet 10 mg (not administered)   citalopram (CeleXA) tablet 20 mg (not administered)   docusate sodium (COLACE) capsule 100 mg (not administered)   HYDROcodone-acetaminophen (NORCO) 7.5-325 MG per tablet 1 tablet (not administered)   ipratropium-albuterol (DUO-NEB) nebulizer solution 3 mL (not administered)   sodium chloride 0.9 % flush 10 mL (not administered)   sodium chloride 0.9 % flush 10 mL (not administered)   enoxaparin (LOVENOX) syringe 40 mg (not administered)   acetaminophen (TYLENOL) tablet 650 mg (not administered)     Or   acetaminophen (TYLENOL) 160 MG/5ML solution 650 mg (not administered)     Or  "  acetaminophen (TYLENOL) suppository 650 mg (not administered)   ondansetron (ZOFRAN) tablet 4 mg (not administered)     Or   ondansetron (ZOFRAN) injection 4 mg (not administered)   magnesium citrate solution 296 mL (not administered)   iopamidol (ISOVUE-370) 76 % injection 100 mL (95 mL Intravenous Given by Other 10/15/19 1715)   HYDROcodone-acetaminophen (NORCO) 7.5-325 MG per tablet 1 tablet (1 tablet Oral Given 10/15/19 1846)   ipratropium-albuterol (DUO-NEB) nebulizer solution 3 mL (3 mL Nebulization Given 10/15/19 1837)       /79   Pulse 80   Temp 96.9 °F (36.1 °C) (Tympanic)   Resp 20   Ht 154.9 cm (61\")   Wt 83.9 kg (185 lb)   SpO2 92%   BMI 34.96 kg/m²       ADMISSION TO [facility]    Discussed treatment plan and reason for admission with pt/family and admitting physician.  Pt/family voiced understanding of the plan for admission for further testing/treatment as needed.      DIAGNOSIS  Final diagnoses:   Hypoxia           Documentation assistance provided by naa Sanchez for ASHA Valdez.  Information recorded by the scrminh was done at my direction and has been verified and validated by me.       Elly Sanchez  10/15/19 1825       Vida Hurt APRN  10/15/19 2132    "

## 2019-10-16 PROCEDURE — 25010000002 ENOXAPARIN PER 10 MG: Performed by: HOSPITALIST

## 2019-10-16 PROCEDURE — 25010000002 ONDANSETRON PER 1 MG: Performed by: HOSPITALIST

## 2019-10-16 PROCEDURE — 94762 N-INVAS EAR/PLS OXIMTRY CONT: CPT

## 2019-10-16 PROCEDURE — 97161 PT EVAL LOW COMPLEX 20 MIN: CPT

## 2019-10-16 PROCEDURE — 97110 THERAPEUTIC EXERCISES: CPT

## 2019-10-16 RX ORDER — ACETAMINOPHEN 500 MG
1000 TABLET ORAL EVERY 8 HOURS
Status: DISCONTINUED | OUTPATIENT
Start: 2019-10-16 | End: 2019-10-18 | Stop reason: HOSPADM

## 2019-10-16 RX ORDER — HYDROCODONE BITARTRATE AND ACETAMINOPHEN 7.5; 325 MG/1; MG/1
1 TABLET ORAL EVERY 8 HOURS PRN
Status: DISCONTINUED | OUTPATIENT
Start: 2019-10-16 | End: 2019-10-18 | Stop reason: HOSPADM

## 2019-10-16 RX ORDER — ZOLPIDEM TARTRATE 5 MG/1
TABLET ORAL
Qty: 2 TABLET | Refills: 0 | Status: SHIPPED | OUTPATIENT
Start: 2019-10-16

## 2019-10-16 RX ADMIN — SODIUM CHLORIDE, PRESERVATIVE FREE 10 ML: 5 INJECTION INTRAVENOUS at 08:50

## 2019-10-16 RX ADMIN — SODIUM CHLORIDE, PRESERVATIVE FREE 10 ML: 5 INJECTION INTRAVENOUS at 21:38

## 2019-10-16 RX ADMIN — ACETAMINOPHEN 1000 MG: 500 TABLET, FILM COATED ORAL at 18:39

## 2019-10-16 RX ADMIN — HYDROCODONE BITARTRATE AND ACETAMINOPHEN 1 TABLET: 7.5; 325 TABLET ORAL at 08:49

## 2019-10-16 RX ADMIN — LISINOPRIL 40 MG: 40 TABLET ORAL at 08:49

## 2019-10-16 RX ADMIN — ONDANSETRON 4 MG: 2 INJECTION INTRAMUSCULAR; INTRAVENOUS at 21:38

## 2019-10-16 RX ADMIN — ASPIRIN 81 MG: 81 TABLET, COATED ORAL at 08:49

## 2019-10-16 RX ADMIN — CITALOPRAM 20 MG: 20 TABLET, FILM COATED ORAL at 06:38

## 2019-10-16 RX ADMIN — ENOXAPARIN SODIUM 40 MG: 40 INJECTION SUBCUTANEOUS at 21:32

## 2019-10-16 RX ADMIN — DOCUSATE SODIUM 100 MG: 100 CAPSULE, LIQUID FILLED ORAL at 21:32

## 2019-10-16 RX ADMIN — ACETAMINOPHEN 1000 MG: 500 TABLET, FILM COATED ORAL at 12:01

## 2019-10-16 NOTE — DISCHARGE PLACEMENT REQUEST
"Moriah Weeks (59 y.o. Female)     Date of Birth Social Security Number Address Home Phone MRN    1960  [id number] [place] [address] [phone] [id number]    Hoahaoism Marital Status          Buddhism        Admission Date Admission Type Admitting Provider Attending Provider Department, Room/Bed    10/15/19 Emergency Pastor Charles MD [place], Robert Russell, MD [address], S518/1    Discharge Date Discharge Disposition Discharge Destination                       Attending Provider:  Eugene Jacinto MD    Allergies:  No Known Allergies    Isolation:  None   Infection:  None   Code Status:  CPR    Ht:  154.9 cm (61\")   Wt:  89.1 kg (196 lb 6.9 oz)    Admission Cmt:  None   Principal Problem:  Hypoxia [R09.02]                 Active Insurance as of 10/15/2019     Primary Coverage     Payor Plan Insurance Group Employer/Plan Group    [employer] EMPLOYEE [id number]     Payor Plan Address Payor Plan Phone Number Payor Plan Fax Number Effective Dates    [address] [phone]  1/1/2016 - None Entered    [facility] [id number]       Subscriber Name Subscriber Birth Date Member ID       MORIAH WEEKS 1960 [id number]                 Emergency Contacts      (Rel.) Home Phone Work Phone Mobile Phone    Yifan Weeks (Spouse) [phone] -- --              "

## 2019-10-16 NOTE — THERAPY EVALUATION
Patient Name: Moriah Weeks  : 1960    MRN: [id number]                              Today's Date: 10/16/2019       Admit Date: 10/15/2019    Visit Dx:     ICD-10-CM ICD-9-CM   1. Hypoxia R09.02 799.02     Patient Active Problem List   Diagnosis   • Status post left knee replacement   • Anxiety and depression   • Arthritis   • Hypertension   • Sleep apnea   • Hypoxia   • Constipation     Past Medical History:   Diagnosis Date   • Anxiety and depression    • Arthritis    • Hypertension    • Knee pain, left    • Sleep apnea     DOES NOT WEAR CPAP      Past Surgical History:   Procedure Laterality Date   • CARPAL TUNNEL RELEASE Right    • CARPAL TUNNEL RELEASE Left    • COLONOSCOPY     • ENDOMETRIAL ABLATION     • HYSTERECTOMY     • KNEE ARTHROSCOPY Left 2019    Procedure: LEFT KNEE ARTHROSCOPY WITH PARTIAL MEDIAL MENISCETOMY, CHONDROPLASTY AND INTERNAL FIXATION TIBIAL PLATEAU INSUFFICIENCY FRACTURE;  Surgeon: Aaron Fleming MD;  Location: [facility] OR [place];  Service: Orthopedics     General Information     Row Name 10/16/19 1533          PT Evaluation Time/Intention    Document Type  evaluation  -EJ     Mode of Treatment  physical therapy  -EJ     Row Name 10/16/19 1533          General Information    Patient Profile Reviewed?  yes  -EJ     Prior Level of Function  independent:;ADL's;all household mobility  -EJ     Existing Precautions/Restrictions  no known precautions/restrictions  -EJ     Barriers to Rehab  none identified  -EJ     Row Name 10/16/19 1533          Relationship/Environment    Lives With  spouse  -EJ     Row Name 10/16/19 1533          Resource/Environmental Concerns    Current Living Arrangements  home/apartment/condo  -EJ     Row Name 10/16/19 1533          Cognitive Assessment/Intervention- PT/OT    Orientation Status (Cognition)  oriented x 4  -EJ     Row Name 10/16/19 1533          Safety Issues, Functional Mobility    Impairments Affecting Function (Mobility)  endurance/activity  tolerance;motor control;pain;range of motion (ROM);strength  -EJ       User Key  (r) = Recorded By, (t) = Taken By, (c) = Cosigned By    Initials Name Provider Type    Rekha Rodriguez, PT Physical Therapist        Mobility     Row Name 10/16/19 1533          Bed Mobility Assessment/Treatment    Bed Mobility Assessment/Treatment  supine-sit;sit-supine  -EJ     Supine-Sit [place] (Bed Mobility)  independent  -EJ     Sit-Supine [place] (Bed Mobility)  independent  -EJ     Row Name 10/16/19 1533          Sit-Stand Transfer    Sit-Stand [place] (Transfers)  supervision  -EJ     Assistive Device (Sit-Stand Transfers)  walker, front-wheeled  -EJ     Row Name 10/16/19 1533          Gait/Stairs Assessment/Training    Gait/Stairs Assessment/Training  gait/ambulation independence  -EJ     [place] Level (Gait)  verbal cues;contact guard;stand by assist  -EJ     Assistive Device (Gait)  walker, front-wheeled  -EJ     Distance in Feet (Gait)  100  -EJ     Deviations/Abnormal Patterns (Gait)  jory decreased;antalgic;stride length decreased  -EJ     Bilateral Gait Deviations  heel strike decreased  -EJ       User Key  (r) = Recorded By, (t) = Taken By, (c) = Cosigned By    Initials Name Provider Type    Rekha Rodriguez, PT Physical Therapist        Obj/Interventions     Row Name 10/16/19 1534          General ROM    GENERAL ROM COMMENTS  WFL, x L knee  -EJ     Row Name 10/16/19 1534          MMT (Manual Muscle Testing)    General MMT Comments  post op weakness  -EJ     Row Name 10/16/19 1534          Therapeutic Exercise    Comment (Therapeutic Exercise)  L TKR protocol x 10 reps  -EJ       User Key  (r) = Recorded By, (t) = Taken By, (c) = Cosigned By    Initials Name Provider Type    Rekha Rodriguez, PT Physical Therapist        Goals/Plan     Row Name 10/16/19 1535          Gait Training Goal 1 (PT)    Activity/Assistive Device (Gait Training Goal 1, PT)  gait (walking locomotion);walker,  rolling  -EJ     [place] Level (Gait Training Goal 1, PT)  supervision required  -EJ     Distance (Gait Goal 1, PT)  300  -EJ     Time Frame (Gait Training Goal 1, PT)  1 week  -EJ     Row Name 10/16/19 1535          ROM Goal 1 (PT)    ROM Goal 1 (PT)  L knee 5-100  -EJ     Time Frame (ROM Goal 1, PT)  1 week  -EJ       User Key  (r) = Recorded By, (t) = Taken By, (c) = Cosigned By    Initials Name Provider Type    EJ Rekha Aaron, PT Physical Therapist        Clinical Impression     Row Name 10/16/19 1534          Pain Assessment    Additional Documentation  Pain Scale: Numbers Pre/Post-Treatment (Group)  -EJ     [facility] Name 10/16/19 1534          Pain Scale: Numbers Pre/Post-Treatment    Pain Scale: Numbers, Pretreatment  2/10  -EJ     Pain Scale: Numbers, Post-Treatment  2/10  -EJ     Pain Location - Side  Left  -EJ     Pain Location  knee  -EJ     Pain Intervention(s)  Repositioned  -EJ     Row Name 10/16/19 1534          Plan of Care Review    Plan of Care Reviewed With  patient  -EJ     [facility] Name 10/16/19 1534          Physical Therapy Clinical Impression    Patient/Family Goals Statement (PT Clinical Impression)  pt plans home with HH  -EJ     Criteria for Skilled Interventions Met (PT Clinical Impression)  yes  -EJ     Rehab Potential (PT Clinical Summary)  good, to achieve stated therapy goals  -EJ     Row Name 10/16/19 1534          Vital Signs    O2 Delivery Pre Treatment  room air  -EJ     O2 Delivery Intra Treatment  room air  -EJ     Post SpO2 (%)  92  -EJ     O2 Delivery Post Treatment  room air  -EJ     Pre Patient Position  Supine  -EJ     Intra Patient Position  Standing  -EJ     Post Patient Position  Supine  -EJ     Row Name 10/16/19 1534          Positioning and Restraints    Pre-Treatment Position  in bed  -EJ     Post Treatment Position  bed  -EJ     In Bed  notified nsg;supine;call light within reach;encouraged to call for assist  -EJ       User Key  (r) = Recorded By, (t) = Taken By,  (c) = Cosigned By    Initials Name Provider Type    Rekha Rodriguez, PT Physical Therapist        Outcome Measures     Row Name 10/16/19 1538          How much help from another person do you currently need...    Turning from your back to your side while in flat bed without using bedrails?  4  -EJ     Moving from lying on back to sitting on the side of a flat bed without bedrails?  4  -EJ     Moving to and from a bed to a chair (including a wheelchair)?  4  -EJ     Standing up from a chair using your arms (e.g., wheelchair, bedside chair)?  4  -EJ     Climbing 3-5 steps with a railing?  3  -EJ     To walk in hospital room?  3  -EJ     AM-PAC 6 Clicks Score (PT)  22  -     Row Name 10/16/19 1538          Functional Assessment    Outcome Measure Options  AM-PAC 6 Clicks Basic Mobility (PT)  -       User Key  (r) = Recorded By, (t) = Taken By, (c) = Cosigned By    Initials Name Provider Type    Rekha Rodriguez, PT Physical Therapist        Physical Therapy Education     Title: PT OT SLP Therapies (In Progress)     Topic: Physical Therapy (In Progress)     Point: Mobility training (Done)     Learning Progress Summary           Patient Acceptance, E,TB,D, VU by  at 10/16/2019  3:36 PM                   Point: Home exercise program (Done)     Learning Progress Summary           Patient Acceptance, E,TB,D, VU by  at 10/16/2019  3:36 PM                               User Key     Initials Effective Dates Name Provider Type [facility] 04/03/18 -  Rekha Aaron, PT Physical Therapist PT              PT Recommendation and Plan  Planned Therapy Interventions (PT Eval): balance training, bed mobility training, home exercise program, gait training, patient/family education, ROM (range of motion), stair training, strengthening, stretching, transfer training  Outcome Summary/Treatment Plan (PT)  Anticipated Discharge Disposition (PT): home with assist, home with home health  Plan of Care Reviewed With:  patient  Progress: improving  Outcome Summary: Pt doing well and agreeable to PT. Pt is s/p L TKR, POD 6. SHe was admitted yesterday with hypoxia. She is currently on room air and seems to be doing better now. Pt presents with post op weakness, pain, and decreased functional mobility. Pt tolerated all knee exercises well and was able to ambulate in hallway with SBA using R wx. Her O2 sats remained above 90% with activity. Pt is hopeful to DC home soon and continue PT. Will continue to follow pt for skilled PT to maximize independence, ROM, and strength with mobility.     Time Calculation:   PT Charges     Row Name 10/16/19 1538             Time Calculation    Start Time  1500  -EJ      Stop Time  1533  -EJ      Time Calculation (min)  33 min  -EJ      PT Received On  10/16/19  -EJ      PT - Next Appointment  10/17/19  -EJ      PT Goal Re-Cert Due Date  10/23/19  -EJ         Time Calculation- PT    Total Timed Code Minutes- PT  23 minute(s)  -EJ        User Key  (r) = Recorded By, (t) = Taken By, (c) = Cosigned By    Initials Name Provider Type    EJ Rekha Aaron, PT Physical Therapist        Therapy Charges for Today     Code Description Service Date Service Provider Modifiers Qty    95915146103 HC PT EVAL LOW COMPLEXITY 2 10/16/2019 Rekha Aaron, PT GP 1    97275105643 HC PT THER PROC EA 15 MIN 10/16/2019 Rekha Aaron, PT GP 1          PT G-Codes  Outcome Measure Options: AM-PAC 6 Clicks Basic Mobility (PT)  AM-PAC 6 Clicks Score (PT): 22    Rekha Aaron PT  10/16/2019

## 2019-10-16 NOTE — ACP (ADVANCE CARE PLANNING)
Patient stated that it was her  who wanted information on the Advance Directive.  I did provide her an AD pamphlet and explained how it works.

## 2019-10-16 NOTE — PROGRESS NOTES
Discharge Planning Assessment  [facility]     Patient Name: Moriah Weeks  MRN: [id number]  Today's Date: 10/16/2019    Admit Date: 10/15/2019    Discharge Needs Assessment     Row Name 10/16/19 0939       Living Environment    Lives With  spouse    Current Living Arrangements  home/apartment/condo    Potentially Unsafe Housing Conditions  other (see comments) no concerns     Primary Care Provided by  self    Provides Primary Care For  pet(s)    Family Caregiver if Needed  spouse    Quality of Family Relationships  helpful;involved;supportive    Able to Return to Prior Arrangements  yes       Resource/Environmental Concerns    Resource/Environmental Concerns  none    Transportation Concerns  car, none       Transition Planning    Patient/Family Anticipates Transition to  home    Patient/Family Anticipated Services at Transition  none    Transportation Anticipated  family or friend will provide       Discharge Needs Assessment    Readmission Within the Last 30 Days  no previous admission in last 30 days    Concerns to be Addressed  no discharge needs identified;denies needs/concerns at this time    Equipment Currently Used at Home  walker, rolling;shower chair    Anticipated Changes Related to Illness  none    Equipment Needed After Discharge  oxygen    Outpatient/Agency/Support Group Needs  homecare agency    Discharge Facility/Level of Care Needs  home with home health        Discharge Plan     Row Name 10/16/19 0940       Plan    Plan  Home with [facility]; follow for 02 needs     Patient/Family in Agreement with Plan  yes    Plan Comments  CCP met with patient at bedside. CCP role explained and discharge planning discussed. Face sheet verified. Patient's PCP is Dr. Lemos. Patient lives with her spouse and two dogs. Patient has two steps to the entrance of her home. Patient has steps leading to her basement but her bedroom/bathroom are on the main level. Patient was recently discharged from [facility] after knee  replacement surgery. Patient has  been using a walker and has a shower chair. Patient states she is current with [facility]. CCP notified [facility]. Patient is currently on 02 and has walking oximetry ordered. If home 02 is needed, patient would like to use [facility]. Patient's preferred pharmacy is [facility] in [place] but was agreeable to meds to beds. Patient plans to return home with [facility]. CCP will follow for 02 needs. Yenifer Shea CSW          Destination      No service coordination in this encounter.      Durable Medical Equipment      No service coordination in this encounter.      Dialysis/Infusion      No service coordination in this encounter.      Home Medical Care      Service Provider Request Status Selected Services Address Phone Number Fax Number    [facility] Pending - Request Sent N/A [id number] [address] [phone]      Therapy      No service coordination in this encounter.      Community Resources      No service coordination in this encounter.          Demographic Summary     Row Name 10/16/19 0939       General Information    Admission Type  inpatient    Arrived From  emergency department    Referral Source  admission list    Reason for Consult  discharge planning    Preferred Language  English     Used During This Interaction  no        Functional Status     Row Name 10/16/19 0939       Functional Status    Usual Activity Tolerance  good    Current Activity Tolerance  good       Functional Status, IADL    Medications  assistive equipment    Meal Preparation  assistive equipment    Housekeeping  assistive equipment    Laundry  assistive equipment    Shopping  assistive equipment       Mental Status    General Appearance WDL  WDL       Mental Status Summary    Recent Changes in Mental Status/Cognitive Functioning  no changes        Psychosocial    No documentation.       Abuse/Neglect    No documentation.       Legal    No  documentation.       Substance Abuse    No documentation.       Patient Forms    No documentation.           RALPH Tripp

## 2019-10-16 NOTE — PLAN OF CARE
Problem: Patient Care Overview  Goal: Plan of Care Review  Outcome: Ongoing (interventions implemented as appropriate)   10/16/19 1450   Coping/Psychosocial   Plan of Care Reviewed With patient   Plan of Care Review   Progress improving   OTHER   Outcome Summary Pt doing well and agreeable to PT. Pt is s/p L TKR, POD 6. SHe was admitted yesterday with hypoxia. She is currently on room air and seems to be doing better now. Pt presents with post op weakness, pain, and decreased functional mobility. Pt tolerated all knee exercises well and was able to ambulate in hallway with SBA using R wx. Her O2 sats remained above 90% with activity. Pt is hopeful to DC home soon and continue PT. Will continue to follow pt for skilled PT to maximize independence, ROM, and strength with mobility.

## 2019-10-16 NOTE — CONSULTS
[facility]  Phone: [phone]  Ayo Saravia MD      Subjective   LOS: 0 days     Thank you for this consultation.  59-year-old female who was noted to have low oxygen saturations by her physical therapist.  Patient recently had knee surgery and was discharged from the hospital October 12.  She was sent to the emergency room and this low oxygen saturation was confirmed.  She was placed on supplemental oxygen and admitted to the hospital.  We were consulted.  ABG shows hypercapnia.    Patient states she has been taking 2 tablets of her pain medication every 4 hours to try and keep on top of the pain.  Her instructions had been to use 1 tablet every 4 hours.  There was perhaps a miscommunication at the time of discharge.  She smokes 1 joint of marijuana every day.  She has previously smoked cigarettes for 10 years and quit 15 years ago.  She has a 10-pack-year smoking history.  She denies any prior diagnosis of lung disease.  She specifically denies asthma or COPD.  She also denies any other health problems such as heart disease, kidney disease, cancer, strokes, diabetes.  She has sleep apnea but has been unable to use a CPAP machine.  She last tried 5 years ago but is willing to try again.    Moriah DE LOS SANTOS Micky  reports that she drinks alcohol.,  reports that she quit smoking about 15 years ago. Her smoking use included cigarettes. She quit after 10.00 years of use. She has never used smokeless tobacco.     Past Hx:  has a past medical history of Anxiety and depression, Arthritis, Hypertension, Knee pain, left, and Sleep apnea.  Surg Hx:  has a past surgical history that includes Hysterectomy; Carpal tunnel release (Right); Carpal tunnel release (Left); Endometrial ablation; Colonoscopy; and Knee Arthroscopy (Left, 4/17/2019).  FH: family history includes Hypertension in her father and mother.  SH:  reports that she quit smoking about 15 years ago. Her smoking use included cigarettes. She quit after  10.00 years of use. She has never used smokeless tobacco. She reports that she drinks alcohol. She reports that she uses drugs. Drug: Marijuana.    Medications Prior to Admission   Medication Sig Dispense Refill Last Dose   • aspirin 81 MG EC tablet Take 1 tablet by mouth Every 12 (Twelve) Hours for 41 doses. 41 tablet 0    • benazepril (LOTENSIN) 40 MG tablet Take 40 mg by mouth Every Morning.   10/10/2019 at 0800   • bisacodyl (DULCOLAX) 5 MG EC tablet Take 2 tablets by mouth Daily As Needed for Constipation. 10 tablet 0    • citalopram (CeleXA) 20 MG tablet Take 20 mg by mouth Every Morning.   10/10/2019 at 0800   • docusate sodium (COLACE) 100 MG capsule Take 1 capsule by mouth 2 (Two) Times a Day for 28 doses. 28 capsule 0    • HYDROcodone-acetaminophen (NORCO) 7.5-325 MG per tablet Take 1 tablet by mouth Every 4 (Four) Hours As Needed for Moderate Pain  for up to 9 days. 42 tablet 0    • acetaminophen (TYLENOL) 500 MG tablet Take 500 mg by mouth Every 6 (Six) Hours As Needed for Mild Pain .   Past Month at Unknown time   • meloxicam (MOBIC) 15 MG tablet Take 15 mg by mouth Every Morning. HELD FOR OR   9/20/2019 at 0800   • ondansetron (ZOFRAN) 4 MG tablet Take 1 tablet by mouth Every 6 (Six) Hours As Needed for Nausea or Vomiting. 30 tablet 0      No Known Allergies    Review of Systems   Constitutional: Negative for chills and fever.   HENT: Negative for congestion, postnasal drip and trouble swallowing.    Respiratory: Negative for cough, shortness of breath and wheezing.    Cardiovascular: Negative for chest pain and leg swelling.   Gastrointestinal: Negative for abdominal pain, diarrhea, nausea and vomiting.   Endocrine: Negative for cold intolerance and heat intolerance.   Genitourinary: Negative for frequency and urgency.   Musculoskeletal: Positive for arthralgias. Negative for back pain.   Skin: Negative for pallor and rash.   Neurological: Negative for seizures and headaches.   Psychiatric/Behavioral:  Negative for confusion. The patient is not nervous/anxious.      Vital Signs past 24hrs  BP range: BP: (118-147)/() 127/72  Pulse range: Heart Rate:  [] 93  Resp rate range: Resp:  [18-20] 20  Temp range: Temp (24hrs), Av.7 °F (36.5 °C), Min:96.9 °F (36.1 °C), Max:98.4 °F (36.9 °C)    Oxygen range: SpO2:  [75 %-97 %] 93 %; Flow (L/min):  [2] 2;   Device (Oxygen Therapy): nasal cannula  89.1 kg (196 lb 6.9 oz); Body mass index is 37.12 kg/m².  No intake/output data recorded.    Adult female laying in bed.  No acute distress.  Pupils equal and react to light.  Oropharynx class II with large tongue.  No posterior pharyngeal discharge.  Nasopharynx without discharge septum midline.  JVP not elevated trachea midline thyroid not enlarged.  Neck is large.  Lungs reveal bilateral air entry.  Clear to auscultation with no rales rhonchi or wheeze.  Percussion not resonant chest expansion equal no chest wall deformity or tenderness.  Heart examination S1-S2 present rhythm regular no murmurs.  No edema lower extremities.  Abdomen is obese, soft, nontender.  Bowel sounds present no liver spleen enlargement.  No peripheral cyanosis clubbing.  Moves all 4 extremities sensorimotor intact.  No cervical, axillary, inguinal adenopathy.  Left knee is slightly swollen and with markings of recent surgery.  Looks like it is healing appropriately.    Results Review:    I have reviewed the laboratory and imaging data from current admission. My annotations are as noted in assessment and plan.    Medication Review:  I have reviewed the current MAR. My annotations are as noted in assessment and plan.    Plan   PCCM Problems  Acute respiratory failure with hypoxia and hypercapnia  Hypoventilation due to pain medications, sleep apnea, marijuana use, obesity  Marijuana use  Sleep apnea currently untreated  Obesity  Hypertension      Plan of Treatment  Her hypoxia was likely driven by hypoventilation which was driven by use of pain  medications excessively in the setting of underlying sleep apnea obesity and marijuana use.  Patient has plans to no longer use marijuana.  She also plans to initiate treatment for her sleep apnea.  She was advised that narcotic pain medications must be used very judiciously and sparingly.  Elimination of pain cannot be the goal.  She understands and is quite alarmed by her low oxygen level.    I would recommend checking a walking oximetry without oxygen tomorrow once her pain medicines have worn off.  Depending on results she may warrant use of oxygen at home or not.    She has sleep apnea.  She did not get a CPAP machine.  She is agreeable to resuming.  I will order an outpatient sleep study so that she can obtain another CPAP device.  In the meantime I will order a sleep oximetry to see if she warrants oxygen at home with sleep until we can get her situated.    After all of the above no objection to discharge.  Her chest x-ray and CT scan were reviewed and are unremarkable.    Part of this note may be an electronic transcription/translation of spoken language to printed text using the Dragon Dictation System.

## 2019-10-16 NOTE — PROGRESS NOTES
Patient: Moriah Weeks  YOB: 1960     Date of Admission: 10/15/2019  4:11 PM Medical Record Number: [id number]     Attending Physician: Eugene Jacinto*    LEFT total knee arthroplasty     Post Operative Day Number: 5    Subjective : No new orthopaedic complaints     Pain Relief: some relief with present medication.     Systemic Complaints: admitted overnight for Hypoxia  Vitals:    10/15/19 1926 10/15/19 2018 10/15/19 2353 10/16/19 0728   BP:  140/82 127/72 128/88   BP Location:  Left arm Left arm Left arm   Patient Position:  Lying Lying Lying   Pulse: 80 92 93 80   Resp:  20 20 20   Temp:  98.4 °F (36.9 °C) 97.8 °F (36.6 °C) 98.3 °F (36.8 °C)   TempSrc:  Oral Oral Oral   SpO2: 92% 91% 93% (!) 86%   Weight:  89.1 kg (196 lb 6.9 oz)     Height:           Physical Exam: 59 y.o. female    General Appearance:       Alert, cooperative, in no acute distress                  Extremities:    Dressing Clean, Dry and Intact         Incision healthy without signs or symptoms of infections         No clinical sign of DVT        Able to do good movements of digits    Pulses:   Pulses palpable and equal bilaterally           Diagnostic Tests:     Results from last 7 days   Lab Units 10/15/19  1624 10/12/19  0425   WBC 10*3/mm3 11.35* 10.42   HEMOGLOBIN g/dL 13.3 12.3   HEMATOCRIT % 39.5 36.2   PLATELETS 10*3/mm3 256 200     Results from last 7 days   Lab Units 10/15/19  1624 10/12/19  0425   SODIUM mmol/L 144 140   POTASSIUM mmol/L 3.7 4.3   CHLORIDE mmol/L 98 101   CO2 mmol/L 33.7* 29.4*   BUN mg/dL 20 12   CREATININE mg/dL 0.69 0.68   GLUCOSE mg/dL 108* 124*   CALCIUM mg/dL 9.0 8.8         No results found for: CRP  No results found for: SEDRATE  No results found for: URICACID  No results found for: CRYSTAL  Microbiology Results (last 10 days)     ** No results found for the last 240 hours. **        Xr Knee 1 Or 2 View Left    Result Date: 10/11/2019  Left knee arthroplasty as expected.  This  report was finalized on 10/11/2019 9:45 AM by Dr. Jeremiah Ventura M.D.              Current Medications:  Scheduled Meds:  acetaminophen 1,000 mg Oral Q8H   aspirin 81 mg Oral Daily   citalopram 20 mg Oral QAM   docusate sodium 100 mg Oral BID   enoxaparin 40 mg Subcutaneous Q24H   lisinopril 40 mg Oral Q24H   sodium chloride 10 mL Intravenous Q12H     Continuous Infusions:   PRN Meds:.bisacodyl  •  HYDROcodone-acetaminophen  •  HYDROcodone-acetaminophen  •  ipratropium-albuterol  •  ondansetron **OR** ondansetron  •  sodium chloride    Assessment:        Patient Active Problem List   Diagnosis   • Status post left knee replacement   • Anxiety and depression   • Arthritis   • Hypertension   • Sleep apnea   • Hypoxia   • Constipation       PLAN:   She is doing well with the LEFT knee.  Continue current post-op course  Island dressing change today  Mobilize with PT as tolerated per protocol. We will put in orders for PT.   We discussed proper use of her pain medication. She has apparently been taking 2 pills every 4 hours on a scheduled basis. She also smokes marijuana daily.   We will change her pain medication to 1 pill every 8 hours. We will also add Tylenol 1gm [id number] scheduled to help with pain.   Oxygen levels continue to drop when on room air. Pulmonology is following.   Discussed use of IS and pulmonary hygiene.     Weight Bearing: WBASHA Hair    Date: 10/16/2019    Time: 10:20 AM

## 2019-10-16 NOTE — PROGRESS NOTES
"DAILY PROGRESS NOTE  [facility]    Patient Identification:  Name: Moriah Weeks  Age: 59 y.o.  Sex: female  :  1960  MRN: [id number]         Primary Care Physician: Eric Lemos MD    Subjective: patient is feeling better today; less short of air; using her incentive spirometer  Interval History: follow up for respiratory failure, recent knee replacement, obesity, hypertension     Objective:    Scheduled Meds:  acetaminophen 1,000 mg Oral Q8H   aspirin 81 mg Oral Daily   citalopram 20 mg Oral QAM   docusate sodium 100 mg Oral BID   enoxaparin 40 mg Subcutaneous Q24H   lisinopril 40 mg Oral Q24H   sodium chloride 10 mL Intravenous Q12H     Continuous Infusions:     Vital signs in last 24 hours:  Temp:  [96.9 °F (36.1 °C)-98.4 °F (36.9 °C)] 98 °F (36.7 °C)  Heart Rate:  [] 104  Resp:  [18-20] 20  BP: (118-147)/() 145/90    Intake/Output:  No intake or output data in the 24 hours ending 10/16/19 1558    Exam:  /90 (BP Location: Left arm, Patient Position: Sitting)   Pulse 104   Temp 98 °F (36.7 °C) (Oral)   Resp 20   Ht 154.9 cm (61\")   Wt 89.1 kg (196 lb 6.9 oz)   SpO2 95%   BMI 37.12 kg/m²     General Appearance:    Alert, cooperative, no distress, AAOx3                          Head:    Normocephalic, without obvious abnormality, atraumatic                           Eyes:    PERRL, conjunctiva/corneas clear, EOM's intact, both eyes                         Throat:   Lips, tongue, gums normal; oral mucosa pink and moist                           Neck:   Supple, symmetrical, trachea midline, no JVD                         Lungs:    Clear to auscultation bilaterally, respirations unlabored                 Chest Wall:    No tenderness or deformity                          Heart:    Regular rate and rhythm, S1 and S2 normal, no murmur,no  Rub                                      or gallop                  Abdomen:     Soft, non-tender, bowel sounds active, no masses, " no                                                        organomegaly                  Extremities:   Extremities normal, atraumatic,  Incision left knee intact with dressing                        Pulses:   Pulses palpable in all extremities                            Skin:   Skin is warm and dry,  no rashes or palpable lesions                  Neurologic:   CNII-XII intact, motor strength grossly intact, sensation grossly                                         intact to light touch, no focal deficits noted       Data Review:  Labs in chart were reviewed.  Lab Results   Component Value Date    WBC 11.35 (H) 10/15/2019    HGB 13.3 10/15/2019    HCT 39.5 10/15/2019     10/15/2019     Lab Results   Component Value Date     10/15/2019    K 3.7 10/15/2019    CL 98 10/15/2019    CO2 33.7 (H) 10/15/2019    BUN 20 10/15/2019    CREATININE 0.69 10/15/2019    GLUCOSE 108 (H) 10/15/2019     Lab Results   Component Value Date    CALCIUM 9.0 10/15/2019     Lab Results   Component Value Date    AST 17 10/15/2019    ALT 21 10/15/2019    ALKPHOS 69 10/15/2019     Patient Active Problem List   Diagnosis Code   • Status post left knee replacement Z96.652   • Anxiety and depression F41.9, F32.9   • Arthritis M19.90   • Hypertension I10   • Sleep apnea G47.30   • Hypoxia R09.02   • Constipation K59.00       Assessment:    Hypoxia    Status post left knee replacement    Anxiety and depression    Hypertension    Sleep apnea    Constipation  obesity affecting all aspects of care    Plan:  Will continue to monitor  Recheck labs in the am  Overnight oximetry to be done  sats 89% on room air when is saw her  Pulmonary notes reviewed  Outpatient sleep study is planned  Medium risk    Jemma Vizcarra MD  10/16/2019  3:58 PM

## 2019-10-16 NOTE — PLAN OF CARE
Problem: Patient Care Overview  Goal: Plan of Care Review  Outcome: Ongoing (interventions implemented as appropriate)   10/16/19 8331   Coping/Psychosocial   Plan of Care Reviewed With patient   Plan of Care Review   Progress improving   OTHER   Outcome Summary Pt A&OX4 presented to ED with hypoxia. Vital signes now stable on 2L 02 NC. Pt stated she had not had a BM since the 11th. Mag citrate and dolcusate given with positive results. Spoke to Dr Charles and got verbal order for pt be off telemetry to shower.

## 2019-10-16 NOTE — PROGRESS NOTES
LOS: 0 days   Patient Care Team:  Eric Lemos MD as PCP - General    Subjective     Patient was lying in bed on my arrival she had her nasal cannula again but the oxygen was turned off on the wall she was saturating well we went ahead and took the cannula off I talked to her for about 8 or 9 minutes and her oxygen saturations remained 94 to 96% she denied any shortness of breath.  She says this is scared her she really wants to get retested for her sleep apnea get on treatment.  She also is going to be very judicious with pain medication other than its okay to use the Tylenol but try to minimize the narcotics.    Review of Systems:          Objective     Vital Signs  Vital Sign Min/Max for last 24 hours  Temp  Min: 96.9 °F (36.1 °C)  Max: 98.4 °F (36.9 °C)   BP  Min: 118/75  Max: 147/106   Pulse  Min: 80  Max: 106   Resp  Min: 18  Max: 20   SpO2  Min: 75 %  Max: 97 %   Flow (L/min)  Min: 0.5  Max: 2   Weight  Min: 83.9 kg (185 lb)  Max: 89.1 kg (196 lb 6.9 oz)        Ventilator/Non-Invasive Ventilation Settings (From admission, onward)    None                       Body mass index is 37.12 kg/m².  No intake/output data recorded.  No intake/output data recorded.        Physical Exam:  General Appearance: Well-developed overweight white female she is resting in bed she does not appear in any acute distress again her oxygen saturation are 94 to 96% on room air  Eyes: Conjunctiva clear and anicteric  ENT: Mucous membranes are moist no erythema or exudates she has a Teena Bernice type II airway  Neck: No adenopathy or thyromegaly no jugular venous distention trachea midline  Lungs: Clear nonlabored symmetric expansion no wheezes rales or rhonchi  Cardiac: Regular rate rhythm no murmur  Abdomen: Obese soft nontender no palpable hepatosplenomegaly or masses  : Not examined  Musculoskeletal: She has a dressing over the incision left anterior knee  Skin: No jaundice no petechiae skin is warm and dry to  touch  Neuro: Alert oriented cooperative following commands grossly intact  Extremities/P Vascular: No clubbing no cyanosis no edema palpable radial and dorsalis pedis pulses bilaterally  MSE: Seems to be in pretty good spirits currently       Labs:  Results from last 7 days   Lab Units 10/15/19  1624 10/12/19  0425   GLUCOSE mg/dL 108* 124*   SODIUM mmol/L 144 140   POTASSIUM mmol/L 3.7 4.3   CO2 mmol/L 33.7* 29.4*   CHLORIDE mmol/L 98 101   ANION GAP mmol/L 12.3 9.6   CREATININE mg/dL 0.69 0.68   BUN mg/dL 20 12   BUN / CREAT RATIO  29.0* 17.6   CALCIUM mg/dL 9.0 8.8   EGFR IF NONAFRICN AM mL/min/1.73 87 88   ALK PHOS U/L 69  --    TOTAL PROTEIN g/dL 6.9  --    ALT (SGPT) U/L 21  --    AST (SGOT) U/L 17  --    BILIRUBIN mg/dL 0.3  --    ALBUMIN g/dL 4.20  --    GLOBULIN gm/dL 2.7  --      Estimated Creatinine Clearance: 88 mL/min (by C-G formula based on SCr of 0.69 mg/dL).      Results from last 7 days   Lab Units 10/15/19  1624 10/12/19  0425   WBC 10*3/mm3 11.35* 10.42   RBC 10*6/mm3 4.43 4.03   HEMOGLOBIN g/dL 13.3 12.3   HEMATOCRIT % 39.5 36.2   MCV fL 89.2 89.8   MCH pg 30.0 30.5   MCHC g/dL 33.7 34.0   RDW % 12.9 12.3   RDW-SD fl 41.5 40.1   MPV fL 11.6 11.6   PLATELETS 10*3/mm3 256 200   NEUTROPHIL % % 76.6* 80.7*   LYMPHOCYTE % % 15.2* 11.4*   MONOCYTES % % 5.7 7.2   EOSINOPHIL % % 1.2 0.0*   BASOPHIL % % 0.6 0.1   IMM GRAN % % 0.7* 0.6*   NEUTROS ABS 10*3/mm3 8.68* 8.41*   LYMPHS ABS 10*3/mm3 1.73 1.19   MONOS ABS 10*3/mm3 0.65 0.75   EOS ABS 10*3/mm3 0.14 0.00   BASOS ABS 10*3/mm3 0.07 0.01   IMMATURE GRANS (ABS) 10*3/mm3 0.08* 0.06*   NRBC /100 WBC 0.4* 0.0     Results from last 7 days   Lab Units 10/15/19  1812   PH, ARTERIAL pH units 7.382   PO2 ART mm Hg 63.3*   PCO2, ARTERIAL mm Hg 59.9*   HCO3 ART mmol/L 35.6*     Results from last 7 days   Lab Units 10/15/19  1624   TROPONIN T ng/mL <0.010                     Microbiology Results (last 10 days)     ** No results found for the last 240 hours. **                 acetaminophen 1,000 mg Oral Q8H   aspirin 81 mg Oral Daily   citalopram 20 mg Oral QAM   docusate sodium 100 mg Oral BID   enoxaparin 40 mg Subcutaneous Q24H   lisinopril 40 mg Oral Q24H   sodium chloride 10 mL Intravenous Q12H          Diagnostics:  Xr Knee 1 Or 2 View Left    Result Date: 10/11/2019  PORTABLE JOINT X-RAY  HISTORY: Left knee arthritis and pain, postop arthroplasty.  Portable x-ray of the left knee is provided.  FINDINGS:  There is arthroplasty hardware, positioned as expected.  No periprosthetic fracture is identified.  There are expected post operative changes in the soft tissues.      Left knee arthroplasty as expected.  This report was finalized on 10/11/2019 9:45 AM by Dr. Jeremiah Ventura M.D.      Xr Knee 1 Or 2 View Left    Result Date: 9/25/2019  XR CHEST PA AND LATERAL-, XR KNEE 1 OR 2 VIEW LEFT-  CLINICAL: Preop left knee surgery, arthritis.  LEFT KNEE FINDINGS: There is narrowing of the medial compartment. There is cement demonstrated within the medial tibial plateau, previous stress fracture. The lateral compartment is preserved. Patellofemoral articulation is within normal limits. There is a small joint effusion. No acute fracture or dislocation or bone lesion identified.  CONCLUSION: Narrowing of the medial compartment and patellofemoral articulation with a joint effusion. Prior injection of cement into the medial tibial plateau for stress fracture.  CHEST FINDINGS: Cardiac size upper limits of normal. No pleural effusion, vascular congestion or acute airspace disease. Mediastinum and rodrigo are satisfactory in appearance.  CONCLUSION: No active cardiovascular or pulmonary process is demonstrated.  This report was finalized on 9/25/2019 9:42 AM by Dr. Alejandro Hoang M.D.      Xr Chest 1 View    Result Date: 10/15/2019  PORTABLE CHEST 10/15/1920 4:23 PM  CLINICAL HISTORY: Dyspnea.  Compared to the previous chest x-ray dated 09/24/2019.  The lungs are well-expanded and  appear free of focal infiltrates. There are no pleural effusions. The heart is mildly enlarged and unchanged. The pulmonary vasculature is within normal limits.  IMPRESSIONS: Cardiomegaly. No evidence of acute disease within the chest.  This report was finalized on 10/15/2019 5:36 PM by Dr. Yifan Jensen M.D.      Ct Angiogram Chest    Result Date: 10/15/2019  CT ANGIOGRAPHY OF THE CHEST WITH INTRAVENOUS CONTRAST AND 3-D RECONSTRUCTIONS  HISTORY: Shortness of breath. Recent knee surgery.  FINDINGS: The CT scan was performed as an emergency procedure with CT angiography protocol using intravenous contrast and 3-D reconstructions and demonstrates the followin. The pulmonary arteries are well-opacified and there is no evidence of pulmonary embolus. The thoracic aorta shows no evidence of aneurysm or dissection. 2. The lungs are well-expanded and clear except for a calcified granuloma in the left lower lobe. There is no mediastinal or hilar or axillary adenopathy. 3. There may be a very tiny pericardial effusion. The CT images through the upper liver, spleen, and both adrenal glands were unremarkable.      Radiation dose reduction techniques were utilized, including automated exposure control and exposure modulation based on body size.  This report was finalized on 10/15/2019 6:34 PM by Dr. Ronni Roberto M.D.      Xr Chest Pa & Lateral    Result Date: 2019  XR CHEST PA AND LATERAL-, XR KNEE 1 OR 2 VIEW LEFT-  CLINICAL: Preop left knee surgery, arthritis.  LEFT KNEE FINDINGS: There is narrowing of the medial compartment. There is cement demonstrated within the medial tibial plateau, previous stress fracture. The lateral compartment is preserved. Patellofemoral articulation is within normal limits. There is a small joint effusion. No acute fracture or dislocation or bone lesion identified.  CONCLUSION: Narrowing of the medial compartment and patellofemoral articulation with a joint effusion. Prior injection of  cement into the medial tibial plateau for stress fracture.  CHEST FINDINGS: Cardiac size upper limits of normal. No pleural effusion, vascular congestion or acute airspace disease. Mediastinum and rodrigo are satisfactory in appearance.  CONCLUSION: No active cardiovascular or pulmonary process is demonstrated.  This report was finalized on 9/25/2019 9:42 AM by Dr. Alejandro Hoang M.D.               Active Hospital Problems    Diagnosis  POA   • **Hypoxia [R09.02]  Yes   • Constipation [K59.00]  Unknown   • Status post left knee replacement [Z96.652]  Not Applicable   • Anxiety and depression [F41.9, F32.9]  Yes   • Hypertension [I10]  Yes   • Sleep apnea [G47.30]  Yes      Resolved Hospital Problems   No resolved problems to display.   Personally reviewed the CT angiogram of the chest.      Assessment/Plan     1. Acute hypoxemic and hypercapnic respiratory failure probably related to narcotics and underlying sleep apnea may be there is some borderline obesity hypoventilation although she is not that large.  Her oxygenation is good now awake on room air she is running 94 to 96%.  2. Obstructive sleep apnea patient is going to get a repeat sleep study and get a Pap machine after discussion with Dr. Saravia and myself.  We will check a nocturnal oximetry and see if she needs O2 with sleep until she can get a Pap machine.  This is also if insurance will cover.  3. Status post left total knee replacement.  Try to minimize narcotic use        Plan for disposition:    Danial Jeff MD  10/16/19  2:04 PM    Time:

## 2019-10-17 LAB
ANION GAP SERPL CALCULATED.3IONS-SCNC: 10.4 MMOL/L (ref 5–15)
BUN BLD-MCNC: 10 MG/DL (ref 8–23)
BUN/CREAT SERPL: 17.9 (ref 7–25)
CALCIUM SPEC-SCNC: 9 MG/DL (ref 8.6–10.5)
CHLORIDE SERPL-SCNC: 100 MMOL/L (ref 98–107)
CO2 SERPL-SCNC: 35.6 MMOL/L (ref 22–29)
CREAT BLD-MCNC: 0.56 MG/DL (ref 0.57–1)
DEPRECATED RDW RBC AUTO: 42.6 FL (ref 37–54)
ERYTHROCYTE [DISTWIDTH] IN BLOOD BY AUTOMATED COUNT: 12.9 % (ref 12.3–15.4)
GFR SERPL CREATININE-BSD FRML MDRD: 110 ML/MIN/1.73
GLUCOSE BLD-MCNC: 119 MG/DL (ref 65–99)
HCT VFR BLD AUTO: 34.4 % (ref 34–46.6)
HGB BLD-MCNC: 11.6 G/DL (ref 12–15.9)
MAGNESIUM SERPL-MCNC: 2.4 MG/DL (ref 1.6–2.4)
MCH RBC QN AUTO: 30.4 PG (ref 26.6–33)
MCHC RBC AUTO-ENTMCNC: 33.7 G/DL (ref 31.5–35.7)
MCV RBC AUTO: 90.3 FL (ref 79–97)
PLATELET # BLD AUTO: 203 10*3/MM3 (ref 140–450)
PMV BLD AUTO: 11.3 FL (ref 6–12)
POTASSIUM BLD-SCNC: 3.4 MMOL/L (ref 3.5–5.2)
RBC # BLD AUTO: 3.81 10*6/MM3 (ref 3.77–5.28)
SODIUM BLD-SCNC: 146 MMOL/L (ref 136–145)
WBC NRBC COR # BLD: 7.21 10*3/MM3 (ref 3.4–10.8)

## 2019-10-17 PROCEDURE — 83735 ASSAY OF MAGNESIUM: CPT | Performed by: INTERNAL MEDICINE

## 2019-10-17 PROCEDURE — 80048 BASIC METABOLIC PNL TOTAL CA: CPT | Performed by: INTERNAL MEDICINE

## 2019-10-17 PROCEDURE — 94660 CPAP INITIATION&MGMT: CPT

## 2019-10-17 PROCEDURE — 97530 THERAPEUTIC ACTIVITIES: CPT

## 2019-10-17 PROCEDURE — 25010000002 ONDANSETRON PER 1 MG: Performed by: HOSPITALIST

## 2019-10-17 PROCEDURE — 25010000002 ENOXAPARIN PER 10 MG: Performed by: HOSPITALIST

## 2019-10-17 PROCEDURE — 97110 THERAPEUTIC EXERCISES: CPT

## 2019-10-17 PROCEDURE — 94799 UNLISTED PULMONARY SVC/PX: CPT

## 2019-10-17 PROCEDURE — 94762 N-INVAS EAR/PLS OXIMTRY CONT: CPT

## 2019-10-17 PROCEDURE — 85027 COMPLETE CBC AUTOMATED: CPT | Performed by: INTERNAL MEDICINE

## 2019-10-17 RX ADMIN — DOCUSATE SODIUM 100 MG: 100 CAPSULE, LIQUID FILLED ORAL at 20:00

## 2019-10-17 RX ADMIN — ASPIRIN 81 MG: 81 TABLET, COATED ORAL at 08:11

## 2019-10-17 RX ADMIN — ENOXAPARIN SODIUM 40 MG: 40 INJECTION SUBCUTANEOUS at 19:59

## 2019-10-17 RX ADMIN — ONDANSETRON 4 MG: 2 INJECTION INTRAMUSCULAR; INTRAVENOUS at 18:54

## 2019-10-17 RX ADMIN — ACETAMINOPHEN 1000 MG: 500 TABLET, FILM COATED ORAL at 04:03

## 2019-10-17 RX ADMIN — ACETAMINOPHEN 1000 MG: 500 TABLET, FILM COATED ORAL at 18:48

## 2019-10-17 RX ADMIN — SODIUM CHLORIDE, PRESERVATIVE FREE 10 ML: 5 INJECTION INTRAVENOUS at 08:11

## 2019-10-17 RX ADMIN — LISINOPRIL 40 MG: 40 TABLET ORAL at 08:11

## 2019-10-17 RX ADMIN — CITALOPRAM 20 MG: 20 TABLET, FILM COATED ORAL at 06:53

## 2019-10-17 RX ADMIN — ACETAMINOPHEN 1000 MG: 500 TABLET, FILM COATED ORAL at 11:25

## 2019-10-17 RX ADMIN — SODIUM CHLORIDE, PRESERVATIVE FREE 10 ML: 5 INJECTION INTRAVENOUS at 20:00

## 2019-10-17 NOTE — DISCHARGE PLACEMENT REQUEST
"Moriah Weeks (59 y.o. Female)     Date of Birth Social Security Number Address Home Phone MRN    1960  [id number] [facility] [phone] [id number]    [place] Marital Status          Church        Admission Date Admission Type Admitting Provider Attending Provider Department, Room/Bed    10/15/19 Emergency Pastor Charles MD [place], Robert Russell, MD [address], [address]    Discharge Date Discharge Disposition Discharge Destination                       Attending Provider:  Eugene Jacinto MD    Allergies:  No Known Allergies    Isolation:  None   Infection:  None   Code Status:  CPR    Ht:  154.9 cm (61\")   Wt:  87.8 kg (193 lb 9 oz)    Admission Cmt:  None   Principal Problem:  Hypoxia [R09.02]                 Active Insurance as of 10/15/2019     Primary Coverage     Payor Plan Insurance Group Employer/Plan Group    [employer] EMPLOY[employer] [id number]     Payor Plan Address Payor Plan Phone Number Payor Plan Fax Number Effective Dates    [address] [phone]  1/1/2016 - None Entered    [facility] [id number]       Subscriber Name Subscriber Birth Date Member ID       MORIAH WEEKS 1960 [id number]                 Emergency Contacts      (Rel.) Home Phone Work Phone Mobile Phone    Yifan Weeks (Spouse) [phone] -- --              "

## 2019-10-17 NOTE — PLAN OF CARE
Problem: Patient Care Overview  Goal: Plan of Care Review  Outcome: Ongoing (interventions implemented as appropriate)   10/17/19 2073   Coping/Psychosocial   Plan of Care Reviewed With patient   Plan of Care Review   Progress no change   OTHER   Outcome Summary vitals stable. on room air. will be noted to desat to mid 85s ocassionally but will quickly return to 90%. scheduled tylenol given to patient. patient often tearful and anxious, states she is scared to go to sleep because of his oxygen status. possible d/c tomorrow.        Problem: Breathing Pattern Ineffective (Adult)  Goal: Identify Related Risk Factors and Signs and Symptoms  Outcome: Ongoing (interventions implemented as appropriate)    Goal: Effective Oxygenation/Ventilation  Outcome: Ongoing (interventions implemented as appropriate)    Goal: Anxiety/Fear Reduction  Outcome: Ongoing (interventions implemented as appropriate)

## 2019-10-17 NOTE — THERAPY TREATMENT NOTE
Patient Name: Moriah Weeks  : 1960    MRN: [id number]                              Today's Date: 10/17/2019       Admit Date: 10/15/2019    Visit Dx:     ICD-10-CM ICD-9-CM   1. Hypoxia R09.02 799.02   2. AIMEE (obstructive sleep apnea) G47.33 327.23     Patient Active Problem List   Diagnosis   • Status post left knee replacement   • Anxiety and depression   • Arthritis   • Hypertension   • Sleep apnea   • Hypoxia   • Constipation     Past Medical History:   Diagnosis Date   • Anxiety and depression    • Arthritis    • Hypertension    • Knee pain, left    • Sleep apnea     DOES NOT WEAR CPAP      Past Surgical History:   Procedure Laterality Date   • CARPAL TUNNEL RELEASE Right    • CARPAL TUNNEL RELEASE Left    • COLONOSCOPY     • ENDOMETRIAL ABLATION     • HYSTERECTOMY     • KNEE ARTHROSCOPY Left 2019    Procedure: LEFT KNEE ARTHROSCOPY WITH PARTIAL MEDIAL MENISCETOMY, CHONDROPLASTY AND INTERNAL FIXATION TIBIAL PLATEAU INSUFFICIENCY FRACTURE;  Surgeon: Araon Fleming MD;  Location[place] [facility] [place] [facility] [place]  Service: Orthopedics     General Information     Row Name 10/17/19 0920          PT Evaluation Time/Intention    Document Type  therapy note (daily note)  -     Mode of Treatment  physical therapy  -     Row Name 10/17/19 0920          General Information    Existing Precautions/Restrictions  fall  -     Row Name 10/17/19 0920          Cognitive Assessment/Intervention- PT/OT    Orientation Status (Cognition)  oriented x 4  -       User Key  (r) = Recorded By, (t) = Taken By, (c) = Cosigned By    Initials Name Provider Type     Elle Machado PTA Physical Therapy Assistant        Mobility     Row Name 10/17/19 0920          Bed Mobility Assessment/Treatment    Comment (Bed Mobility)  up in chair  -     Row Name 10/17/19 0920          Sit-Stand Transfer    Sit-Stand [place] (Transfers)  supervision  -     Assistive Device (Sit-Stand Transfers)  walker, front-wheeled  -     Row  Name 10/17/19 0920          Gait/Stairs Assessment/Training    [place] Level (Gait)  stand by assist  -     Assistive Device (Gait)  walker, front-wheeled  -     Distance in Feet (Gait)  180  -SM     Pattern (Gait)  step-through  -     Deviations/Abnormal Patterns (Gait)  jory decreased;stride length decreased  -     Bilateral Gait Deviations  forward flexed posture  -       User Key  (r) = Recorded By, (t) = Taken By, (c) = Cosigned By    Initials Name Provider Type    Elle Turcios PTA Physical Therapy Assistant        Obj/Interventions     Row Name 10/17/19 0921          Therapeutic Exercise    Comment (Therapeutic Exercise)  L TKR protocol x 15 reps  -       User Key  (r) = Recorded By, (t) = Taken By, (c) = Cosigned By    Initials Name Provider Type    Elle Turcios PTA Physical Therapy Assistant        Goals/Plan    No documentation.       Clinical Impression     Row Name 10/17/19 0921          Pain Assessment    Additional Documentation  Pain Scale: Numbers Pre/Post-Treatment (Group)  -[facility] Name 10/17/19 0921          Pain Scale: Numbers Pre/Post-Treatment    Pain Scale: Numbers, Pretreatment  0/10 - no pain  -     Pain Scale: Numbers, Post-Treatment  2/10  -SM     Pain Location - Side  Left  -SM     Pain Location  knee  -     Pain Intervention(s)  Repositioned;Ambulation/increased activity;Rest  -     Row Name 10/17/19 0921          Positioning and Restraints    Pre-Treatment Position  sitting in chair/recliner  -     Post Treatment Position  chair  -SM     In Chair  reclined;call light within reach;encouraged to call for assist  -       User Key  (r) = Recorded By, (t) = Taken By, (c) = Cosigned By    Initials Name Provider Type    Elle Turcios PTA Physical Therapy Assistant        Outcome Measures     Row Name 10/17/19 0925          How much help from another person do you currently need...    Turning from your back to your side while in  flat bed without using bedrails?  4  -SM     Moving from lying on back to sitting on the side of a flat bed without bedrails?  4  -SM     Moving to and from a bed to a chair (including a wheelchair)?  3  -SM     Standing up from a chair using your arms (e.g., wheelchair, bedside chair)?  3  -SM     Climbing 3-5 steps with a railing?  3  -SM     To walk in hospital room?  3  -SM     AM-PAC 6 Clicks Score (PT)  20  -SM     Row Name 10/17/19 0925          Functional Assessment    Outcome Measure Options  AM-PAC 6 Clicks Basic Mobility (PT)  -       User Key  (r) = Recorded By, (t) = Taken By, (c) = Cosigned By    Initials Name Provider Type     Elle Macahdo PTA Physical Therapy Assistant        Physical Therapy Education     Title: PT OT SLP Therapies (Done)     Topic: Physical Therapy (Done)     Point: Mobility training (Done)     Learning Progress Summary           Patient Acceptance, E,TB,D, VU,NR by  at 10/17/2019  9:22 AM    Acceptance, E, VU by  at 10/17/2019  5:59 AM    Acceptance, E,TB,D, VU by  at 10/16/2019  3:36 PM                   Point: Home exercise program (Done)     Learning Progress Summary           Patient Acceptance, E,TB,D, VU,NR by  at 10/17/2019  9:22 AM    Acceptance, E, VU by  at 10/17/2019  5:59 AM    Acceptance, E,TB,D, VU by  at 10/16/2019  3:36 PM                   Point: Body mechanics (Done)     Learning Progress Summary           Patient Acceptance, E,TB,D, VU,NR by  at 10/17/2019  9:22 AM    Acceptance, E, VU by  at 10/17/2019  5:59 AM                   Point: Precautions (Done)     Learning Progress Summary           Patient Acceptance, E,TB,D, VU,NR by  at 10/17/2019  9:22 AM    Acceptance, E, VU by  at 10/17/2019  5:59 AM                               User Key     Initials Effective Dates Name Provider Type Discipline     04/03/18 -  Rekha Aaron, PT Physical Therapist PT     03/07/18 -  Elle Machado PTA Physical Therapy Assistant PT     CHERRI 06/16/16 -  Henrietta Balderas RN Registered Nurse Nurse              PT Recommendation and Plan     Outcome Summary/Treatment Plan (PT)  Anticipated Discharge Disposition (PT): home with assist, home with home health  Plan of Care Reviewed With: patient  Progress: improving  Outcome Summary: Pt tolerated treatment well this date. Increased gait distanace to 180ft w/ Rw and SBA. Less assist required overall. Performed L TKR protocol w/out difficulty. O2 sats began at 93%, then increased to 96% by end of session while on RA throughout. Encouraged pt to ambulate w/ nsg a few times throughout the day in the hallway. PT will continue to address functional mobility deficits as tolerated.     Time Calculation:   PT Charges     Row Name 10/17/19 0925             Time Calculation    Start Time  0840  -      Stop Time  0904  -      Time Calculation (min)  24 min  -      PT Received On  10/17/19  -      PT - Next Appointment  10/18/19  -SM        User Key  (r) = Recorded By, (t) = Taken By, (c) = Cosigned By    Initials Name Provider Type     Elle Machado PTA Physical Therapy Assistant        Therapy Charges for Today     Code Description Service Date Service Provider Modifiers Qty    72901645681 HC PT THER PROC EA 15 MIN 10/17/2019 Elle Machado PTA GP 1    74071687339 HC PT THERAPEUTIC ACT EA 15 MIN 10/17/2019 Elle Machado PTA GP 1          PT G-Codes  Outcome Measure Options: AM-PAC 6 Clicks Basic Mobility (PT)  AM-PAC 6 Clicks Score (PT): 20    Elle Machado PTA  10/17/2019

## 2019-10-17 NOTE — PROGRESS NOTES
LOS: 1 day   Patient Care Team:  Eric Lemos MD as PCP - General    Subjective     Patient is breathing well awake she says she is afraid to sleep because her oxygen dropped so she did have a nocturnal oximetry last night and she did have severe desaturation.  Patient badly wants to go home I discussed with her that ideally she needs to be on a Pap machine she said she might be willing if the price is affordable to rent a Pap machine until she can get her sleep study    Review of Systems:          Objective     Vital Signs  Vital Sign Min/Max for last 24 hours  Temp  Min: 97.6 °F (36.4 °C)  Max: 99.3 °F (37.4 °C)   BP  Min: 142/97  Max: 156/102   Pulse  Min: 77  Max: 104   Resp  Min: 20  Max: 20   SpO2  Min: 64 %  Max: 95 %   Flow (L/min)  Min: 2  Max: 2   Weight  Min: 87.8 kg (193 lb 9 oz)  Max: 87.8 kg (193 lb 9 oz)        Ventilator/Non-Invasive Ventilation Settings (From admission, onward)    None                       Body mass index is 36.57 kg/m².  No intake/output data recorded.  I/O this shift:  In: 240 [P.O.:240]  Out: -         Physical Exam:  General Appearance: Well-developed overweight white female she is resting in bed she does not appear in any acute distress again her oxygen saturation are 94 to 96% on room air  Eyes: Conjunctiva clear and anicteric  ENT: Mucous membranes are moist no erythema or exudates she has a Mallampati type II airway   Neck: No adenopathy or thyromegaly no jugular venous distention trachea midline  Lungs: Clear nonlabored symmetric expansion no wheezes rales or rhonchi  Cardiac: Regular rate rhythm no murmur  Abdomen: Obese soft nontender no palpable hepatosplenomegaly or masses  : Not examined  Musculoskeletal: She has a dressing over the incision left anterior knee  Skin: No jaundice no petechiae skin is warm and dry to touch  Neuro: Alert oriented cooperative following commands grossly intact  Extremities/P Vascular: No clubbing no cyanosis no edema  palpable radial and dorsalis pedis pulses bilaterally  MSE: Seems to be in pretty good spirits currently       Labs:  Results from last 7 days   Lab Units 10/17/19  0523 10/15/19  1624 10/12/19  0425   GLUCOSE mg/dL 119* 108* 124*   SODIUM mmol/L 146* 144 140   POTASSIUM mmol/L 3.4* 3.7 4.3   MAGNESIUM mg/dL 2.4  --   --    CO2 mmol/L 35.6* 33.7* 29.4*   CHLORIDE mmol/L 100 98 101   ANION GAP mmol/L 10.4 12.3 9.6   CREATININE mg/dL 0.56* 0.69 0.68   BUN mg/dL 10 20 12   BUN / CREAT RATIO  17.9 29.0* 17.6   CALCIUM mg/dL 9.0 9.0 8.8   EGFR IF NONAFRICN AM mL/min/1.73 110 87 88   ALK PHOS U/L  --  69  --    TOTAL PROTEIN g/dL  --  6.9  --    ALT (SGPT) U/L  --  21  --    AST (SGOT) U/L  --  17  --    BILIRUBIN mg/dL  --  0.3  --    ALBUMIN g/dL  --  4.20  --    GLOBULIN gm/dL  --  2.7  --      Estimated Creatinine Clearance: 107.6 mL/min (A) (by C-G formula based on SCr of 0.56 mg/dL (L)).      Results from last 7 days   Lab Units 10/17/19  0523 10/15/19  1624 10/12/19  0425   WBC 10*3/mm3 7.21 11.35* 10.42   RBC 10*6/mm3 3.81 4.43 4.03   HEMOGLOBIN g/dL 11.6* 13.3 12.3   HEMATOCRIT % 34.4 39.5 36.2   MCV fL 90.3 89.2 89.8   MCH pg 30.4 30.0 30.5   MCHC g/dL 33.7 33.7 34.0   RDW % 12.9 12.9 12.3   RDW-SD fl 42.6 41.5 40.1   MPV fL 11.3 11.6 11.6   PLATELETS 10*3/mm3 203 256 200   NEUTROPHIL % %  --  76.6* 80.7*   LYMPHOCYTE % %  --  15.2* 11.4*   MONOCYTES % %  --  5.7 7.2   EOSINOPHIL % %  --  1.2 0.0*   BASOPHIL % %  --  0.6 0.1   IMM GRAN % %  --  0.7* 0.6*   NEUTROS ABS 10*3/mm3  --  8.68* 8.41*   LYMPHS ABS 10*3/mm3  --  1.73 1.19   MONOS ABS 10*3/mm3  --  0.65 0.75   EOS ABS 10*3/mm3  --  0.14 0.00   BASOS ABS 10*3/mm3  --  0.07 0.01   IMMATURE GRANS (ABS) 10*3/mm3  --  0.08* 0.06*   NRBC /100 WBC  --  0.4* 0.0     Results from last 7 days   Lab Units 10/15/19  1812   PH, ARTERIAL pH units 7.382   PO2 ART mm Hg 63.3*   PCO2, ARTERIAL mm Hg 59.9*   HCO3 ART mmol/L 35.6*     Results from last 7 days   Lab Units  10/15/19  1624   TROPONIN T ng/mL <0.010                     Microbiology Results (last 10 days)     ** No results found for the last 240 hours. **                acetaminophen 1,000 mg Oral Q8H   aspirin 81 mg Oral Daily   citalopram 20 mg Oral QAM   docusate sodium 100 mg Oral BID   enoxaparin 40 mg Subcutaneous Q24H   lisinopril 40 mg Oral Q24H   sodium chloride 10 mL Intravenous Q12H          Diagnostics:  Xr Knee 1 Or 2 View Left    Result Date: 10/11/2019  PORTABLE JOINT X-RAY  HISTORY: Left knee arthritis and pain, postop arthroplasty.  Portable x-ray of the left knee is provided.  FINDINGS:  There is arthroplasty hardware, positioned as expected.  No periprosthetic fracture is identified.  There are expected post operative changes in the soft tissues.      Left knee arthroplasty as expected.  This report was finalized on 10/11/2019 9:45 AM by Dr. Jeremiah Ventura M.D.      Xr Knee 1 Or 2 View Left    Result Date: 9/25/2019  XR CHEST PA AND LATERAL-, XR KNEE 1 OR 2 VIEW LEFT-  CLINICAL: Preop left knee surgery, arthritis.  LEFT KNEE FINDINGS: There is narrowing of the medial compartment. There is cement demonstrated within the medial tibial plateau, previous stress fracture. The lateral compartment is preserved. Patellofemoral articulation is within normal limits. There is a small joint effusion. No acute fracture or dislocation or bone lesion identified.  CONCLUSION: Narrowing of the medial compartment and patellofemoral articulation with a joint effusion. Prior injection of cement into the medial tibial plateau for stress fracture.  CHEST FINDINGS: Cardiac size upper limits of normal. No pleural effusion, vascular congestion or acute airspace disease. Mediastinum and rodrigo are satisfactory in appearance.  CONCLUSION: No active cardiovascular or pulmonary process is demonstrated.  This report was finalized on 9/25/2019 9:42 AM by Dr. Alejandro Hoang M.D.      Xr Chest 1 View    Result Date: 10/15/2019  PORTABLE  CHEST 10/15/1920 4:23 PM  CLINICAL HISTORY: Dyspnea.  Compared to the previous chest x-ray dated 2019.  The lungs are well-expanded and appear free of focal infiltrates. There are no pleural effusions. The heart is mildly enlarged and unchanged. The pulmonary vasculature is within normal limits.  IMPRESSIONS: Cardiomegaly. No evidence of acute disease within the chest.  This report was finalized on 10/15/2019 5:36 PM by Dr. Yifan Jensen M.D.      Ct Angiogram Chest    Result Date: 10/15/2019  CT ANGIOGRAPHY OF THE CHEST WITH INTRAVENOUS CONTRAST AND 3-D RECONSTRUCTIONS  HISTORY: Shortness of breath. Recent knee surgery.  FINDINGS: The CT scan was performed as an emergency procedure with CT angiography protocol using intravenous contrast and 3-D reconstructions and demonstrates the followin. The pulmonary arteries are well-opacified and there is no evidence of pulmonary embolus. The thoracic aorta shows no evidence of aneurysm or dissection. 2. The lungs are well-expanded and clear except for a calcified granuloma in the left lower lobe. There is no mediastinal or hilar or axillary adenopathy. 3. There may be a very tiny pericardial effusion. The CT images through the upper liver, spleen, and both adrenal glands were unremarkable.      Radiation dose reduction techniques were utilized, including automated exposure control and exposure modulation based on body size.  This report was finalized on 10/15/2019 6:34 PM by Dr. Ronni Roberto M.D.      Xr Chest Pa & Lateral    Result Date: 2019  XR CHEST PA AND LATERAL-, XR KNEE 1 OR 2 VIEW LEFT-  CLINICAL: Preop left knee surgery, arthritis.  LEFT KNEE FINDINGS: There is narrowing of the medial compartment. There is cement demonstrated within the medial tibial plateau, previous stress fracture. The lateral compartment is preserved. Patellofemoral articulation is within normal limits. There is a small joint effusion. No acute fracture or dislocation or bone  lesion identified.  CONCLUSION: Narrowing of the medial compartment and patellofemoral articulation with a joint effusion. Prior injection of cement into the medial tibial plateau for stress fracture.  CHEST FINDINGS: Cardiac size upper limits of normal. No pleural effusion, vascular congestion or acute airspace disease. Mediastinum and rodrigo are satisfactory in appearance.  CONCLUSION: No active cardiovascular or pulmonary process is demonstrated.  This report was finalized on 9/25/2019 9:42 AM by Dr. Alejandro Haong M.D.               Active Hospital Problems    Diagnosis  POA   • **Hypoxia [R09.02]  Yes   • Constipation [K59.00]  Unknown   • Status post left knee replacement [Z96.652]  Not Applicable   • Anxiety and depression [F41.9, F32.9]  Yes   • Hypertension [I10]  Yes   • Sleep apnea [G47.30]  Yes      Resolved Hospital Problems   No resolved problems to display.   Severe nocturnal hypoxemia over 3 hours with sats less than 89%      Assessment/Plan     1. Acute hypoxemic and hypercapnic respiratory failure probably related to narcotics and underlying sleep apnea may be there is some borderline obesity hypoventilation although she is not that large.  Her oxygenation is good now awake on room air she is running 94 to 96%.  2. Obstructive sleep apnea patient is going to get a repeat sleep study and get a Pap machine after discussion with Dr. Saravia and myself.  Patient has severe nocturnal hypoxemia.  I do not think oxygen is really the solution here I think she needs her Pap machine and she has been afraid to sleep because she has been dropping so.  She is agreeable to getting a Pap machine and running at the price is reasonable until she can get a formal sleep study to re-certify.  I have spoken with CCP and put in orders for an auto titrating CPAP.  I have put in orders if she stays for an auto CPAP at night here and a nocturnal oximetry on that so we can treat her as best we can until we can get a sleep  study.  3. Status post left total knee replacement.  Try to minimize narcotic use she really has to be extremely cautious with any sedative use.        Plan for disposition:    Danial Jeff MD  10/17/19  10:13 AM    Time:

## 2019-10-17 NOTE — DISCHARGE INSTR - APPOINTMENTS
Sleep Study Nov 5 @ 7:30PM.   [address], [address] [facility] [address], [place] [address].

## 2019-10-17 NOTE — PLAN OF CARE
Problem: Patient Care Overview  Goal: Plan of Care Review  Outcome: Ongoing (interventions implemented as appropriate)   10/17/19 3418   Coping/Psychosocial   Plan of Care Reviewed With patient   Plan of Care Review   Progress no change   OTHER   Outcome Summary No change noted, though pain seems to be staying at bay with minimal pain medication.Overnight oximetry preformed until around 0345 when pt desated requireing 2L 02. VSS , Will CTM and provide care.

## 2019-10-17 NOTE — PROGRESS NOTES
Continued Stay Note  [facility]     Patient Name: Moriah Weeks  MRN: [id number]  Today's Date: 10/17/2019    Admit Date: 10/15/2019    Discharge Plan     Row Name 10/17/19 1256       Plan    Plan  Plan is home with [facility].  CCP working on arranging a new CPAP thru [facility].    Plan Comments  Spoke w/ Dr Jeff who states pt needs a CPAP, but has not had a recent sleep study.  He has placed orders for sleep study and CPAP in [facility].  Sleep study arranged for Nov 5 @ 7:30pm at the sleep lab in [place].  Pt is in agreement.  Pt requests [facility] for CPAP.. Call placed to [facility] ([phone]) who states they cannot set up a CPAP without a sleep study, even if pt agrees to rent the machine.  Pt notified and is in agreement with CCP finding a company who can provide the CPAP.  Call placed to [facility] [employer] ([phone]) who states they can provide the CPAP prior to the sleep study.  Pt will need to sign an ABN form and provide a credit card on file.  They can deliver to the pt in hospital tomorrow.  Pt notified and is in agreement.         Discharge Codes    No documentation.             Nichelle Dhaliwal RN

## 2019-10-18 VITALS
RESPIRATION RATE: 18 BRPM | SYSTOLIC BLOOD PRESSURE: 144 MMHG | OXYGEN SATURATION: 97 % | BODY MASS INDEX: 36.05 KG/M2 | WEIGHT: 190.92 LBS | HEIGHT: 61 IN | TEMPERATURE: 98.9 F | DIASTOLIC BLOOD PRESSURE: 96 MMHG | HEART RATE: 81 BPM

## 2019-10-18 PROCEDURE — 94618 PULMONARY STRESS TESTING: CPT

## 2019-10-18 PROCEDURE — 97530 THERAPEUTIC ACTIVITIES: CPT

## 2019-10-18 PROCEDURE — 97110 THERAPEUTIC EXERCISES: CPT

## 2019-10-18 RX ADMIN — ACETAMINOPHEN 1000 MG: 500 TABLET, FILM COATED ORAL at 16:05

## 2019-10-18 RX ADMIN — ASPIRIN 81 MG: 81 TABLET, COATED ORAL at 09:35

## 2019-10-18 RX ADMIN — LISINOPRIL 40 MG: 40 TABLET ORAL at 09:35

## 2019-10-18 RX ADMIN — ACETAMINOPHEN 1000 MG: 500 TABLET, FILM COATED ORAL at 04:24

## 2019-10-18 RX ADMIN — HYDROCODONE BITARTRATE AND ACETAMINOPHEN 1 TABLET: 7.5; 325 TABLET ORAL at 09:35

## 2019-10-18 RX ADMIN — SODIUM CHLORIDE, PRESERVATIVE FREE 10 ML: 5 INJECTION INTRAVENOUS at 09:40

## 2019-10-18 RX ADMIN — DOCUSATE SODIUM 100 MG: 100 CAPSULE, LIQUID FILLED ORAL at 09:35

## 2019-10-18 RX ADMIN — CITALOPRAM 20 MG: 20 TABLET, FILM COATED ORAL at 06:37

## 2019-10-18 NOTE — PROGRESS NOTES
LOS: 2 days   Patient Care Team:  Eric Lemos MD as PCP - General    Subjective     Patient's breathing better breathing fine now she says she walked with physical therapy she does have a little bit of knee pain.  She use the Pap machine last night she had a lot of air leak she had a full facemask.  But aside from that she was able to relax a little better using the machine.    Review of Systems:          Objective     Vital Signs  Vital Sign Min/Max for last 24 hours  Temp  Min: 98.2 °F (36.8 °C)  Max: 98.7 °F (37.1 °C)   BP  Min: 134/90  Max: 152/97   Pulse  Min: 75  Max: 89   Resp  Min: 18  Max: 20   SpO2  Min: 90 %  Max: 98 %   Flow (L/min)  Min: 2  Max: 2   Weight  Min: 86.6 kg (190 lb 14.7 oz)  Max: 86.6 kg (190 lb 14.7 oz)        Ventilator/Non-Invasive Ventilation Settings (From admission, onward)    None                       Body mass index is 36.07 kg/m².  I/O last 3 completed shifts:  In: 840 [P.O.:840]  Out: -   I/O this shift:  In: 120 [P.O.:120]  Out: -         Physical Exam:  General Appearance: Well-developed overweight white female she is resting in bed she does not appear in any acute distress again her oxygen saturation are 94 to 96% on room air  Eyes: Conjunctiva clear and anicteric  ENT: Mucous membranes are moist no erythema or exudates she has a Mallampati type II airway   Neck: No adenopathy or thyromegaly no jugular venous distention trachea midline  Lungs: Clear nonlabored symmetric expansion no wheezes rales or rhonchi  Cardiac: Regular rate rhythm no murmur  Abdomen: Obese soft nontender no palpable hepatosplenomegaly or masses  : Not examined  Musculoskeletal: She has a dressing over the incision left anterior knee  Skin: No jaundice no petechiae skin is warm and dry to touch  Neuro: Alert oriented cooperative following commands grossly intact  Extremities/P Vascular: No clubbing no cyanosis no edema palpable radial and dorsalis pedis pulses bilaterally  MSE: Seems to  be in pretty good spirits currently  Exam is really completely unchanged from yesterday     Labs:  Results from last 7 days   Lab Units 10/17/19  0523 10/15/19  1624 10/12/19  0425   GLUCOSE mg/dL 119* 108* 124*   SODIUM mmol/L 146* 144 140   POTASSIUM mmol/L 3.4* 3.7 4.3   MAGNESIUM mg/dL 2.4  --   --    CO2 mmol/L 35.6* 33.7* 29.4*   CHLORIDE mmol/L 100 98 101   ANION GAP mmol/L 10.4 12.3 9.6   CREATININE mg/dL 0.56* 0.69 0.68   BUN mg/dL 10 20 12   BUN / CREAT RATIO  17.9 29.0* 17.6   CALCIUM mg/dL 9.0 9.0 8.8   EGFR IF NONAFRICN AM mL/min/1.73 110 87 88   ALK PHOS U/L  --  69  --    TOTAL PROTEIN g/dL  --  6.9  --    ALT (SGPT) U/L  --  21  --    AST (SGOT) U/L  --  17  --    BILIRUBIN mg/dL  --  0.3  --    ALBUMIN g/dL  --  4.20  --    GLOBULIN gm/dL  --  2.7  --      Estimated Creatinine Clearance: 106.8 mL/min (A) (by C-G formula based on SCr of 0.56 mg/dL (L)).      Results from last 7 days   Lab Units 10/17/19  0523 10/15/19  1624 10/12/19  0425   WBC 10*3/mm3 7.21 11.35* 10.42   RBC 10*6/mm3 3.81 4.43 4.03   HEMOGLOBIN g/dL 11.6* 13.3 12.3   HEMATOCRIT % 34.4 39.5 36.2   MCV fL 90.3 89.2 89.8   MCH pg 30.4 30.0 30.5   MCHC g/dL 33.7 33.7 34.0   RDW % 12.9 12.9 12.3   RDW-SD fl 42.6 41.5 40.1   MPV fL 11.3 11.6 11.6   PLATELETS 10*3/mm3 203 256 200   NEUTROPHIL % %  --  76.6* 80.7*   LYMPHOCYTE % %  --  15.2* 11.4*   MONOCYTES % %  --  5.7 7.2   EOSINOPHIL % %  --  1.2 0.0*   BASOPHIL % %  --  0.6 0.1   IMM GRAN % %  --  0.7* 0.6*   NEUTROS ABS 10*3/mm3  --  8.68* 8.41*   LYMPHS ABS 10*3/mm3  --  1.73 1.19   MONOS ABS 10*3/mm3  --  0.65 0.75   EOS ABS 10*3/mm3  --  0.14 0.00   BASOS ABS 10*3/mm3  --  0.07 0.01   IMMATURE GRANS (ABS) 10*3/mm3  --  0.08* 0.06*   NRBC /100 WBC  --  0.4* 0.0     Results from last 7 days   Lab Units 10/15/19  1812   PH, ARTERIAL pH units 7.382   PO2 ART mm Hg 63.3*   PCO2, ARTERIAL mm Hg 59.9*   HCO3 ART mmol/L 35.6*     Results from last 7 days   Lab Units 10/15/19  0037    TROPONIN T ng/mL <0.010                     Microbiology Results (last 10 days)     ** No results found for the last 240 hours. **                acetaminophen 1,000 mg Oral Q8H   aspirin 81 mg Oral Daily   citalopram 20 mg Oral QAM   docusate sodium 100 mg Oral BID   enoxaparin 40 mg Subcutaneous Q24H   lisinopril 40 mg Oral Q24H   sodium chloride 10 mL Intravenous Q12H          Diagnostics:  Xr Knee 1 Or 2 View Left    Result Date: 10/11/2019  PORTABLE JOINT X-RAY  HISTORY: Left knee arthritis and pain, postop arthroplasty.  Portable x-ray of the left knee is provided.  FINDINGS:  There is arthroplasty hardware, positioned as expected.  No periprosthetic fracture is identified.  There are expected post operative changes in the soft tissues.      Left knee arthroplasty as expected.  This report was finalized on 10/11/2019 9:45 AM by Dr. Jeremiah Ventura M.D.      Xr Knee 1 Or 2 View Left    Result Date: 9/25/2019  XR CHEST PA AND LATERAL-, XR KNEE 1 OR 2 VIEW LEFT-  CLINICAL: Preop left knee surgery, arthritis.  LEFT KNEE FINDINGS: There is narrowing of the medial compartment. There is cement demonstrated within the medial tibial plateau, previous stress fracture. The lateral compartment is preserved. Patellofemoral articulation is within normal limits. There is a small joint effusion. No acute fracture or dislocation or bone lesion identified.  CONCLUSION: Narrowing of the medial compartment and patellofemoral articulation with a joint effusion. Prior injection of cement into the medial tibial plateau for stress fracture.  CHEST FINDINGS: Cardiac size upper limits of normal. No pleural effusion, vascular congestion or acute airspace disease. Mediastinum and rodrigo are satisfactory in appearance.  CONCLUSION: No active cardiovascular or pulmonary process is demonstrated.  This report was finalized on 9/25/2019 9:42 AM by Dr. Alejandro Hoang M.D.      Xr Chest 1 View    Result Date: 10/15/2019  PORTABLE CHEST  10/15/1920 4:23 PM  CLINICAL HISTORY: Dyspnea.  Compared to the previous chest x-ray dated 2019.  The lungs are well-expanded and appear free of focal infiltrates. There are no pleural effusions. The heart is mildly enlarged and unchanged. The pulmonary vasculature is within normal limits.  IMPRESSIONS: Cardiomegaly. No evidence of acute disease within the chest.  This report was finalized on 10/15/2019 5:36 PM by Dr. Yifan Jensen M.D.      Ct Angiogram Chest    Result Date: 10/15/2019  CT ANGIOGRAPHY OF THE CHEST WITH INTRAVENOUS CONTRAST AND 3-D RECONSTRUCTIONS  HISTORY: Shortness of breath. Recent knee surgery.  FINDINGS: The CT scan was performed as an emergency procedure with CT angiography protocol using intravenous contrast and 3-D reconstructions and demonstrates the followin. The pulmonary arteries are well-opacified and there is no evidence of pulmonary embolus. The thoracic aorta shows no evidence of aneurysm or dissection. 2. The lungs are well-expanded and clear except for a calcified granuloma in the left lower lobe. There is no mediastinal or hilar or axillary adenopathy. 3. There may be a very tiny pericardial effusion. The CT images through the upper liver, spleen, and both adrenal glands were unremarkable.      Radiation dose reduction techniques were utilized, including automated exposure control and exposure modulation based on body size.  This report was finalized on 10/15/2019 6:34 PM by Dr. Ronni Roberto M.D.      Xr Chest Pa & Lateral    Result Date: 2019  XR CHEST PA AND LATERAL-, XR KNEE 1 OR 2 VIEW LEFT-  CLINICAL: Preop left knee surgery, arthritis.  LEFT KNEE FINDINGS: There is narrowing of the medial compartment. There is cement demonstrated within the medial tibial plateau, previous stress fracture. The lateral compartment is preserved. Patellofemoral articulation is within normal limits. There is a small joint effusion. No acute fracture or dislocation or bone lesion  identified.  CONCLUSION: Narrowing of the medial compartment and patellofemoral articulation with a joint effusion. Prior injection of cement into the medial tibial plateau for stress fracture.  CHEST FINDINGS: Cardiac size upper limits of normal. No pleural effusion, vascular congestion or acute airspace disease. Mediastinum and rodrigo are satisfactory in appearance.  CONCLUSION: No active cardiovascular or pulmonary process is demonstrated.  This report was finalized on 9/25/2019 9:42 AM by Dr. Alejandro Hoang M.D.               Active Hospital Problems    Diagnosis  POA   • **Hypoxia [R09.02]  Yes   • Constipation [K59.00]  Unknown   • Status post left knee replacement [Z96.652]  Not Applicable   • Anxiety and depression [F41.9, F32.9]  Yes   • Hypertension [I10]  Yes   • Sleep apnea [G47.30]  Yes      Resolved Hospital Problems   No resolved problems to display.   Severe nocturnal hypoxemia over 3 hours with sats less than 89%      Assessment/Plan     1. Acute hypoxemic and hypercapnic respiratory failure probably related to narcotics and underlying sleep apnea .  Her oxygenation is good now awake on room air she is running 94 to 96%.  And she had a normal exercise oximetry saturations went up with exertion on room air.  She will not require oxygen at discharge.  2. Obstructive sleep apnea patient is going to get a repeat sleep study and get a Pap machine after discussion with Dr. Saraiva and myself.  Patient has severe nocturnal hypoxemia.  I do not think oxygen is really the solution here I think she needs her Pap machine and she has been afraid to sleep because she has been dropping so.  She is agreeable to getting a Pap machine as long as the price is reasonable until she can get a formal sleep study to re-certify.  I have spoken with [facility] and put in orders for an auto titrating CPAP.  This is been arranged and should be here this afternoon.  Her sleep study has been scheduled for November 2 at [place] and she  will follow-up with Dr. Saravia after this.  3. Status post left total knee replacement.  Try to minimize narcotic use she really has to be extremely cautious with any sedative use.        Plan for disposition: Okay from pulmonary standpoint for discharge home .  We will see PRN thank you    Danial Jeff MD  10/18/19  11:55 AM    Time:

## 2019-10-18 NOTE — PLAN OF CARE
Problem: Patient Care Overview  Goal: Plan of Care Review  Outcome: Ongoing (interventions implemented as appropriate)   10/18/19 1227   Coping/Psychosocial   Plan of Care Reviewed With patient   Plan of Care Review   Progress no change   OTHER   Outcome Summary VSS with no narcotics or c/o pain. An auto titrating CPAP utilized for part of the night. Follow up out pt sleep sutberna scheduled for Nov 5th. Home today with [facility] and CPAP. will CTM and provide care.

## 2019-10-18 NOTE — PLAN OF CARE
Problem: Patient Care Overview  Goal: Plan of Care Review  Outcome: Ongoing (interventions implemented as appropriate)   10/18/19 7076   Coping/Psychosocial   Plan of Care Reviewed With patient   Plan of Care Review   Progress improving   OTHER   Outcome Summary Pt tolerated treatment well this date. Increased gait distance to 500ft w/ Rw and SV. Able to perform walking oximetry test w/ RT. Pt had no c/o SOA throughout, only taking 1 standing rest break. Plan to d/c home w/ HH soon.

## 2019-10-18 NOTE — PROGRESS NOTES
Exercise Oximetry    Patient Name:Moriah Weeks   MRN: [id number]   Date: 10/18/19             ROOM AIR BASELINE   SpO2% 91   Heart Rate 84   Blood Pressure      EXERCISE ON ROOM AIR SpO2% EXERCISE ON O2 @  LPM SpO2%   1 MINUTE 93 1 MINUTE    2 MINUTES 94 2 MINUTES    3 MINUTES 93 3 MINUTES    4 MINUTES 92 4 MINUTES    5 MINUTES 93 5 MINUTES    6 MINUTES 95 6 MINUTES               Distance Walked   Distance Walked   Dyspnea (Tiffany Scale)   Dyspnea (Tiffany Scale)   Fatigue (Tiffany Scale)   Fatigue (Tiffany Scale)   SpO2% Post Exercise  96 SpO2% Post Exercise   HR Post Exercise  86 HR Post Exercise   Time to Recovery   Time to Recovery     Comments: patient completed walk on room air. Sats were 91-96% during and after walk. Patient had no dyspnea dizziness etc during and after walk.  Thanks

## 2019-10-18 NOTE — PROGRESS NOTES
"DAILY PROGRESS NOTE  [facility]    Patient Identification:  Name: Moriah Weeks  Age: 59 y.o.  Sex: female  :  1960  MRN: [id number]         Primary Care Physician: Eric Lemos MD    Subjective: patient is sitting up feels a little better  Interval History: follow up for hypoxia, suspected sleep apnea, obesity, anemia, hypertension    Objective:    Scheduled Meds:  acetaminophen 1,000 mg Oral Q8H   aspirin 81 mg Oral Daily   citalopram 20 mg Oral QAM   docusate sodium 100 mg Oral BID   enoxaparin 40 mg Subcutaneous Q24H   lisinopril 40 mg Oral Q24H   sodium chloride 10 mL Intravenous Q12H     Continuous Infusions:     Vital signs in last 24 hours:  Temp:  [97.6 °F (36.4 °C)-98.7 °F (37.1 °C)] 98.7 °F (37.1 °C)  Heart Rate:  [77-98] 87  Resp:  [18-20] 18  BP: (134-156)/() 134/90    Intake/Output:    Intake/Output Summary (Last 24 hours) at 10/17/2019 2012  Last data filed at 10/17/2019 1700  Gross per 24 hour   Intake 840 ml   Output --   Net 840 ml       Exam:  /90 (BP Location: Left arm, Patient Position: Lying)   Pulse 87   Temp 98.7 °F (37.1 °C) (Oral)   Resp 18   Ht 154.9 cm (61\")   Wt 87.8 kg (193 lb 9 oz)   SpO2 90%   BMI 36.57 kg/m²     General Appearance:    Alert, cooperative, no distress, AAOx3                          Head:    Normocephalic, without obvious abnormality, atraumatic                           Eyes:    PERRL, conjunctiva/corneas clear, EOM's intact, both eyes                         Throat:   Lips, tongue, gums normal; oral mucosa pink and moist                           Neck:   Supple, symmetrical, trachea midline, no JVD                         Lungs:    Clear to auscultation bilaterally, respirations unlabored                 Chest Wall:    No tenderness or deformity                          Heart:    Regular rate and rhythm, S1 and S2 normal, no murmur,no  Rub                                      or gallop                  Abdomen:     " Soft, non-tender, bowel sounds active, no masses, no                                                        organomegaly                  Extremities:   Extremities normal, atraumatic, no cyanosis or edema                        Pulses:   Pulses palpable in all extremities                            Skin:   Skin is warm and dry,  no rashes or palpable lesions                  Neurologic:   CNII-XII intact, motor strength grossly intact, sensation grossly                                         intact to light touch, no focal deficits noted       Data Review:  Labs in chart were reviewed.  Lab Results   Component Value Date    WBC 7.21 10/17/2019    HGB 11.6 (L) 10/17/2019    HCT 34.4 10/17/2019     10/17/2019     Lab Results   Component Value Date     (H) 10/17/2019    K 3.4 (L) 10/17/2019     10/17/2019    CO2 35.6 (H) 10/17/2019    BUN 10 10/17/2019    CREATININE 0.56 (L) 10/17/2019    GLUCOSE 119 (H) 10/17/2019     Lab Results   Component Value Date    CALCIUM 9.0 10/17/2019    MG 2.4 10/17/2019     Lab Results   Component Value Date    AST 17 10/15/2019    ALT 21 10/15/2019    ALKPHOS 69 10/15/2019     No results found for: APTT, INR  Patient Active Problem List   Diagnosis Code   • Status post left knee replacement Z96.652   • Anxiety and depression F41.9, F32.9   • Arthritis M19.90   • Hypertension I10   • Sleep apnea G47.30   • Hypoxia R09.02   • Constipation K59.00       Assessment:    Hypoxia    Status post left knee replacement    Anxiety and depression    Hypertension    Sleep apnea    Constipation  obesity affecting all aspects of care    Plan:  cpap delivered  Trial tonight  Rep to do teaching tomorrow  Now stable on room air  Expect she will be stable for discharge tomorrow  Ronda patient  Notes reviewed  Medium risk    Jemma Vizcarra MD  10/17/2019  8:12 PM

## 2019-10-18 NOTE — DISCHARGE SUMMARY
PHYSICIAN DISCHARGE SUMMARY                                                                        [facility]    Patient Identification:  Name: Moriah Weeks  Age: 59 y.o.  Sex: female  :  1960  MRN: [id number]  Primary Care Physician: Eric Lemos MD    Admit date: 10/15/2019  Discharge date and time:10/18/19    Discharged Condition: good    Discharge Diagnoses:  Hypoxia    Status post left knee replacement    Anxiety and depression    Hypertension    Sleep apnea    Constipation   acute  Hypoxic respiratory failure poa resolved  Obesity affecting all aspects of care  Patient Active Problem List   Diagnosis Code   • Status post left knee replacement Z96.652   • Anxiety and depression F41.9, F32.9   • Arthritis M19.90   • Hypertension I10   • Sleep apnea G47.30   • Hypoxia R09.02   • Constipation K59.00       PMHX:   Past Medical History:   Diagnosis Date   • Anxiety and depression    • Arthritis    • Hypertension    • Knee pain, left    • Sleep apnea     DOES NOT WEAR CPAP      PSHX:   Past Surgical History:   Procedure Laterality Date   • CARPAL TUNNEL RELEASE Right    • CARPAL TUNNEL RELEASE Left    • COLONOSCOPY     • ENDOMETRIAL ABLATION     • HYSTERECTOMY     • KNEE ARTHROSCOPY Left 2019    Procedure: LEFT KNEE ARTHROSCOPY WITH PARTIAL MEDIAL MENISCETOMY, CHONDROPLASTY AND INTERNAL FIXATION TIBIAL PLATEAU INSUFFICIENCY FRACTURE;  Surgeon: Aaron Fleming MD;  [place] [facility] [place] [facility] [place]: [place]opedics       Hospital Course: Moriah Weeks  Was admitted with shortness of breath; she had recently undergone a knee replacement and was undergoing physical therapy; oxygen sats were low and she was sent to the hospital; she was hypoxic and oxygen was applied; she has a history of sleep apnea but was not using her cpap; she was admitted and seen by dr west; she was able to wean off oxygen while awake but  cpap was ordered for nighttime; she will be going with it and a formal sleep study is planned for     Consults:     Consults     Date and Time Order Name Status Description    10/15/2019 1958 Inpatient Pulmonology Consult Completed     10/15/2019 1810 [facility] (on-call MD unless specified) Details Completed         Results from last 7 days   Lab Units 10/17/19  0523   WBC 10*3/mm3 7.21   HEMOGLOBIN g/dL 11.6*   HEMATOCRIT % 34.4   PLATELETS 10*3/mm3 203     Results from last 7 days   Lab Units 10/17/19  0523   SODIUM mmol/L 146*   POTASSIUM mmol/L 3.4*   CHLORIDE mmol/L 100   CO2 mmol/L 35.6*   BUN mg/dL 10   CREATININE mg/dL 0.56*   GLUCOSE mg/dL 119*   CALCIUM mg/dL 9.0     Significant Diagnostic Studies: Labs in chart were reviewed.  Imaging Results (all)     Procedure Component Value Units Date/Time    CT Angiogram Chest [[id number]] Collected:  10/15/19 1752     Updated:  10/15/19 1837    Narrative:       CT ANGIOGRAPHY OF THE CHEST WITH INTRAVENOUS CONTRAST AND 3-D  RECONSTRUCTIONS     HISTORY: Shortness of breath. Recent knee surgery.     FINDINGS: The CT scan was performed as an emergency procedure with CT  angiography protocol using intravenous contrast and 3-D reconstructions  and demonstrates the followin. The pulmonary arteries are well-opacified and there is no evidence of  pulmonary embolus. The thoracic aorta shows no evidence of aneurysm or  dissection.  2. The lungs are well-expanded and clear except for a calcified  granuloma in the left lower lobe. There is no mediastinal or hilar or  axillary adenopathy.  3. There may be a very tiny pericardial effusion. The CT images through  the upper liver, spleen, and both adrenal glands were unremarkable.                 Radiation dose reduction techniques were utilized, including automated  exposure control and exposure modulation based on body size.     This report was finalized on 10/15/2019 6:34 PM by Dr. Ronni Roberto M.D.       XR Chest 1 View  "[[id number]] Collected:  10/15/19 1735     Updated:  10/15/19 1739    Narrative:       PORTABLE CHEST 10/15/1920 4:23 PM     CLINICAL HISTORY: Dyspnea.     Compared to the previous chest x-ray dated 09/24/2019.     The lungs are well-expanded and appear free of focal infiltrates. There  are no pleural effusions. The heart is mildly enlarged and unchanged.  The pulmonary vasculature is within normal limits.     IMPRESSIONS: Cardiomegaly. No evidence of acute disease within the  chest.     This report was finalized on 10/15/2019 5:36 PM by Dr. Yifan Jensen M.D.           /96 (BP Location: Left arm, Patient Position: Sitting)   Pulse 81   Temp 98.9 °F (37.2 °C) (Oral)   Resp 18   Ht 154.9 cm (61\")   Wt 86.6 kg (190 lb 14.7 oz)   SpO2 97%   BMI 36.07 kg/m²     Discharge Exam:  General Appearance:    Alert, cooperative, no distress, AAOx3                          Head:    Normocephalic, without obvious abnormality, atraumatic                          Eyes:    PERRL, conjunctiva/corneas clear, EOM's intact, both eyes                        Throat:   Lips, tongue, gums normal; oral mucosa pink and moist                          Neck:   Supple, symmetrical, trachea midline, no JVD                        Lungs:     Clear to auscultation bilaterally, respirations unlabored                Chest Wall:    No tenderness or deformity                        Heart:    Regular rate and rhythm, S1 and S2 normal, no murmur,no  Rub                                       or gallop                  Abdomen:     Soft, non-tender, bowel sounds active, no masses, no                                                        organomegaly                  Extremities:   Extremities normal, atraumatic, no cyanosis or edema, pulses                                           palpable in all extremities                             Skin:   Skin is warm and dry,  no rashes or palpable lesions                  Neurologic:   CNII-XII intact, " motor strength grossly intact, sensation grossly                                           intact to light touch, no focal deficits noted     Disposition:  Home with home health    Patient Instructions:      Discharge Medications      New Medications      Instructions Start Date   zolpidem 5 MG tablet  Commonly known as:  AMBIEN   Bring to sleep lab DO NOT use at home. PLEASE take if not asleep within 30 mins of start of study.         Continue These Medications      Instructions Start Date   acetaminophen 500 MG tablet  Commonly known as:  TYLENOL   500 mg, Oral, Every 6 Hours PRN      aspirin 81 MG EC tablet   81 mg, Oral, Every 12 Hours Scheduled      benazepril 40 MG tablet  Commonly known as:  LOTENSIN   40 mg, Oral, Every Morning      bisacodyl 5 MG EC tablet  Commonly known as:  DULCOLAX   10 mg, Oral, Daily PRN      citalopram 20 MG tablet  Commonly known as:  CeleXA   20 mg, Oral, Every Morning      docusate sodium 100 MG capsule  Commonly known as:  COLACE   100 mg, Oral, 2 Times Daily      HYDROcodone-acetaminophen 7.5-325 MG per tablet  Commonly known as:  NORCO   1 tablet, Oral, Every 4 Hours PRN      meloxicam 15 MG tablet  Commonly known as:  MOBIC   15 mg, Oral, Every Morning, HELD FOR OR      ondansetron 4 MG tablet  Commonly known as:  ZOFRAN   4 mg, Oral, Every 6 Hours PRN             Future Appointments   Date Time Provider Department Center   11/5/2019  7:30 PM [facility] SLEEP [facility]      Contact information for follow-up providers     Eric Lemos MD .    Specialty:  Family Medicine  Contact information:  [address]  [address]  [place] [address] [phone]                   Contact information for after-discharge care     Durable Medical Equipment     [facility] .    Service:  Durable Medical Equipment  Contact information:  [address]  [place] [address] [phone]                           Discharge Order (From admission,  onward)    Start     Ordered    10/18/19 1356  Discharge patient  Once     Expected Discharge Date:  10/18/19    Discharge Disposition:  Home-Health Care c    Physician of Record for Attribution - Please select from Treatment Team:  NATI VALDEZ [[id number]]    Review needed by CMO to determine Physician of Record:  No       Question Answer Comment   Physician of Record for Attribution - Please select from Treatment Team NATI VALDEZ    Review needed by CMO to determine Physician of Record No        10/18/19 1356        Follow up with pcp, pulmonary, ortho     Signed:  Jemma Vizcarra MD  10/18/2019  1:56 PM

## 2019-10-21 NOTE — PROGRESS NOTES
Case Management Discharge Note    Final Note: Confirmed with Mari/ Eleazar LAST that pt was DC'd home with [facility]     Destination      No service has been selected for the patient.      Durable Medical Equipment - Selection Complete      Service Provider Request Status Selected Services Address Phone Number Fax Number    [facility] Selected Durable Medical Equipment [address], [place] [id number] [phone]      Dialysis/Infusion      No service has been selected for the patient.      Home Medical Care      Service Provider Request Status Selected Services Address Phone Number Fax Number    [facility] [place] Selected Home Health Services [address] [facility] [address] [phone]      Therapy      No service has been selected for the patient.      Community Resources      No service has been selected for the patient.             Final Discharge Disposition Code: 06 - home with home health care([facility] LAST)

## 2019-11-05 ENCOUNTER — HOSPITAL ENCOUNTER (OUTPATIENT)
Dept: SLEEP MEDICINE | Facility: HOSPITAL | Age: 59
Discharge: HOME | End: 2019-11-05
Admitting: INTERNAL MEDICINE

## 2019-11-05 DIAGNOSIS — G47.33 OSA (OBSTRUCTIVE SLEEP APNEA): ICD-10-CM

## 2019-11-05 PROCEDURE — 95811 POLYSOM 6/>YRS CPAP 4/> PARM: CPT

## 2019-11-11 ENCOUNTER — TELEPHONE (OUTPATIENT)
Dept: SLEEP MEDICINE | Facility: HOSPITAL | Age: 59
End: 2019-11-11

## 2019-11-11 NOTE — TELEPHONE ENCOUNTER
Spoke with patient about sleep study results, sending orders to [facility] [place] ( they supplied CPAP until we could get a sleep study ). Follow up scheduled 12/6/19

## 2019-12-01 NOTE — PROGRESS NOTES
[facility] SLEEP MEDICINE  [address]  3rd [address]  [place] [address]  [phone]    PCP: Eric Lemos MD    Reason for visit:  Sleep disorders: AIMEE    Moriah is a 59 y.o.female who was seen in the [facility] today. Seen during hospitalization. Subsequent sleep study and now with CPAP through [employer]. She had previous dx and could not use ffm. She got a nasal mask, fits poorly and leaks near eyes. Has not tried to change. She sleeps from 11pm to 6:30am. She does feel more rested in AM and does not awaken as often with CPAP. She reports dry mouth - awakens her. She has ordered a chin strap but not tried yet.  Hughes Sleepiness Scale is 2. Caffeine 4 per day. Alcohol 0 per week.    Moriah  reports that she quit smoking about 15 years ago. Her smoking use included cigarettes. She quit after 10.00 years of use. She has never used smokeless tobacco.    Pertinent Positive Review of Systems of denies  Rest of Review of Systems was negative as recorded in Sleep Questionnaire.    Patient  has a past medical history of Anxiety and depression, Arthritis, Hypertension, Knee pain, left, and Sleep apnea.     Current Medications:    Current Outpatient Medications:   •  acetaminophen (TYLENOL) 500 MG tablet, Take 500 mg by mouth Every 6 (Six) Hours As Needed for Mild Pain ., Disp: , Rfl:   •  benazepril (LOTENSIN) 40 MG tablet, Take 40 mg by mouth Every Morning., Disp: , Rfl:   •  bisacodyl (DULCOLAX) 5 MG EC tablet, Take 2 tablets by mouth Daily As Needed for Constipation., Disp: 10 tablet, Rfl: 0  •  citalopram (CeleXA) 20 MG tablet, Take 20 mg by mouth Every Morning., Disp: , Rfl:   •  meloxicam (MOBIC) 15 MG tablet, Take 15 mg by mouth Every Morning. HELD FOR OR, Disp: , Rfl:   •  ondansetron (ZOFRAN) 4 MG tablet, Take 1 tablet by mouth Every 6 (Six) Hours As Needed for Nausea or Vomiting., Disp: 30 tablet, Rfl: 0  •  zolpidem (AMBIEN) 5 MG tablet, Bring to sleep lab DO NOT use at home.  "PLEASE take if not asleep within 30 mins of start of study., Disp: 2 tablet, Rfl: 0   also entered in Sleep Questionnaire         Vital Signs: /93   Pulse 83   Ht 154.9 cm (61\")   Wt 84.5 kg (186 lb 3.2 oz)   SpO2 94%   BMI 35.18 kg/m²     Body mass index is 35.18 kg/m².       Tongue: large      Dentition: good       Pharynx: Posterior pharyngeal pillars are narrow   Mallampatti: II (hard and soft palate, upper portion of tonsils anduvula visible)        General: Alert. Cooperative. Well developed. No acute distress.             Head:  Normocephalic. Symmetrical. Atraumatic.              Nose: No septal deviation. No drainage.          Throat: No oral lesions. No thrush. Moist mucous membranes.    Chest Wall:  Normal shape. Symmetric expansion with respiration. No tenderness.             Neck:  Trachea midline.           Lungs:  Clear to auscultation bilaterally. No wheezes. No rhonchi. No rales. Respirations regular, even and unlabored.            Heart:  Regular rhythm and normal rate. Normal S1 and S2. No murmur.     Abdomen:  Soft, non-tender and non-distended. Normal bowel sounds. No masses.  Extremities:  Moves all extremities well. No edema.    Psychiatric: Normal mood and affect.    Study:  · 11/5/19  Overnight split PSG.  Diagnostic study from 10:27pm to 12:13am. Sleep efficiency 83.7% with 1.49 hrs of TST. Sleep distribution shows absence of REM sleep. There is high density of SWS at 41%. AHI index is 32.8 with RDI 36.2. During supine sleep AHI is 57.3 with RDI 61.4. Due to absence of REM sleep severity may be underestimated. O2 saturation fewll below 89% for 11.6% of TST. PLM index is extremely high at 93.9. PLM arousal index is 7.4. Snoring noted for 91.37% of TST.  Titration study from 12:13am to 5:15am. Sleep efficiency 90.4% with 4.54 hrs of TST. Sleep distribution shows a rebound in REM sleep to 18.3% of TST. AHI index improved with CPAP device. O2 saturation remained above 88%. PLM " resolved with CPAP device. CPAP increased from 5 to 18 cwp. Maximum sleep at 13 cwp however slightly higher pressures re required to get AHI < 5. At pressures higher than 15 cms, the AHI index increases factitiously due to oral air leaks.    Testing:  · Compliance and set up is average 4 hours 23 minutes.  AHI 1.9.  Average pressure is 14 to 15 cm.  Auto CPAP is set between 10 and 15 cm.  AHI is 1.9.    DME Company: [email or web address]    Impression:  1. Obstructive sleep apnea    2. Obesity (BMI 30-39.9)    3. Dry mouth    4. Chronic narcotic use        Plan:  Moriah is having air leaks.  Based on her sleep study I will try fixed 12 to see if the leaks will come down.  Her AHI is well within normal limits on current settings.  Otherwise she is somewhat compliant.  She is having excessive dry mouth.  She cannot tolerate a full facemask.  She has a chinstrap and it was advised to try and use it.  She can also try a change in style of mask with her DME company.    Excessive dry mouth as above.    I reiterated the importance of effective treatment of obstructive sleep apnea with PAP machine.  Cardiovascular health risks of untreated sleep apnea were again reviewed.  Patient was asked to remain cautious if there is persistent hypersomnolence. The benefit of weight loss in reducing severity of obstructive sleep apnea was discussed.  Patient would benefit from adhering to a strict diet to achieve ideal BMI.     Change of PAP supplies regularly is important for effective use.  Change of cushion on the mask or plugs on nasal pillows along with disposable filters once every month and change of mask frame, tubing, headgear and Velcro straps every 6 months at the minimum was reiterated.    This patient is compliant with PAP machine and benefits from its use.  Apnea hypopneas index is corrected/improved.  Daytime hypersomnolence has resolved.     Patient will follow up in this clinic in 3 months  APRN.    Thank you for allowing me to  participate in your patient's care.    Ayo Saravia MD    Part of this note may be an electronic transcription/translation of spoken language to printed text using the Dragon Dictation System.

## 2019-12-06 ENCOUNTER — TELEPHONE (OUTPATIENT)
Dept: SLEEP MEDICINE | Facility: HOSPITAL | Age: 59
End: 2019-12-06

## 2019-12-06 ENCOUNTER — OFFICE VISIT (OUTPATIENT)
Dept: SLEEP MEDICINE | Facility: HOSPITAL | Age: 59
End: 2019-12-06

## 2019-12-06 VITALS
HEART RATE: 83 BPM | HEIGHT: 61 IN | SYSTOLIC BLOOD PRESSURE: 148 MMHG | BODY MASS INDEX: 35.16 KG/M2 | DIASTOLIC BLOOD PRESSURE: 93 MMHG | WEIGHT: 186.2 LBS | OXYGEN SATURATION: 94 %

## 2019-12-06 DIAGNOSIS — G47.33 OBSTRUCTIVE SLEEP APNEA: Primary | ICD-10-CM

## 2019-12-06 DIAGNOSIS — F11.90 CHRONIC NARCOTIC USE: ICD-10-CM

## 2019-12-06 DIAGNOSIS — R68.2 DRY MOUTH: ICD-10-CM

## 2019-12-06 DIAGNOSIS — E66.9 OBESITY (BMI 30-39.9): ICD-10-CM

## 2019-12-06 PROCEDURE — G0463 HOSPITAL OUTPT CLINIC VISIT: HCPCS

## 2020-03-02 ENCOUNTER — OFFICE VISIT (OUTPATIENT)
Dept: SLEEP MEDICINE | Facility: HOSPITAL | Age: 60
End: 2020-03-02

## 2020-03-02 VITALS
BODY MASS INDEX: 35.12 KG/M2 | SYSTOLIC BLOOD PRESSURE: 149 MMHG | HEART RATE: 77 BPM | WEIGHT: 186 LBS | DIASTOLIC BLOOD PRESSURE: 88 MMHG | OXYGEN SATURATION: 97 % | HEIGHT: 61 IN

## 2020-03-02 DIAGNOSIS — E66.9 OBESITY (BMI 30-39.9): ICD-10-CM

## 2020-03-02 DIAGNOSIS — G47.33 OBSTRUCTIVE SLEEP APNEA: Primary | ICD-10-CM

## 2020-03-02 DIAGNOSIS — R09.81 NASAL CONGESTION: ICD-10-CM

## 2020-03-02 DIAGNOSIS — J93.82 AIR LEAK: ICD-10-CM

## 2020-03-02 PROCEDURE — G0463 HOSPITAL OUTPT CLINIC VISIT: HCPCS

## 2020-03-02 NOTE — PROGRESS NOTES
[facility]  Telephone: [phone] / Fax: [phone] [place]  Telephone: [phone] / Fax: [phone] Marilyn Pardo    PCP: Eric Lemos MD    Reason for visit: AIMEE f/u    Moriah Weeks is a 59 y.o.female  was last seen at [facility] sleep lab in December 2019 and is here today to review her progress on the CPAP machine. Last visit her CPAP pressures were changed to fixed 12cm. She noticed that oral leaks have improved. However, she now c/o excessive air leak into her eyes. She uses nasal mask. It fits well. She also reports chronic left nostril congestion.  She otherwise benefits from the CPAP device use in terms of reduction of hypersomnia and snoring. There is no complaint of aerophagia. She reports dry mouth. Her sleep schedule is 11pm-6:30am. ESS is 3.    SH- no tobacco, no alcohol, 4 caffeine per day, no energy drinks.    ROS- +nasal congestion, rest is negative.    DME [employer]    Current Medications:    Current Outpatient Medications:   •  acetaminophen (TYLENOL) 500 MG tablet, Take 500 mg by mouth Every 6 (Six) Hours As Needed for Mild Pain ., Disp: , Rfl:   •  benazepril (LOTENSIN) 40 MG tablet, Take 40 mg by mouth Every Morning., Disp: , Rfl:   •  bisacodyl (DULCOLAX) 5 MG EC tablet, Take 2 tablets by mouth Daily As Needed for Constipation., Disp: 10 tablet, Rfl: 0  •  citalopram (CeleXA) 20 MG tablet, Take 20 mg by mouth Every Morning., Disp: , Rfl:   •  meloxicam (MOBIC) 15 MG tablet, Take 15 mg by mouth Every Morning. HELD FOR OR, Disp: , Rfl:   •  ondansetron (ZOFRAN) 4 MG tablet, Take 1 tablet by mouth Every 6 (Six) Hours As Needed for Nausea or Vomiting., Disp: 30 tablet, Rfl: 0  •  zolpidem (AMBIEN) 5 MG tablet, Bring to sleep lab DO NOT use at home. PLEASE take if not asleep within 30 mins of start of study., Disp: 2 tablet, Rfl: 0   also entered in Sleep Questionnaire    Patient  has a past medical history of Anxiety and depression, Arthritis, Hypertension, Knee  "pain, left, and Sleep apnea.    I have reviewed the Past Medical History, Past Surgical History, Social History and Family History.    Vital Signs /88   Pulse 77   Ht 154.9 cm (61\")   Wt 84.4 kg (186 lb)   SpO2 97%   BMI 35.14 kg/m²  Body mass index is 35.14 kg/m².    General Alert and oriented. No acute distress noted   Pharynx/Throat Class II Mallampati airway, large tongue, no evidence of redundant lateral pharyngeal tissue. No oral lesions. No thrush. Moist mucous membranes.   Head Normocephalic. Symmetrical. Atraumatic.    Nose No septal deviation. No drainage   Chest Wall Normal shape. Symmetric expansion with respiration. No tenderness.   Neck Trachea midline, no thyromegaly or adenopathy    Lungs Clear to auscultation bilaterally. No wheezes. No rhonchi. No rales. Respirations regular, even and unlabored.   Heart Regular rhythm and normal rate. Normal S1 and S2. No murmur   Abdomen Soft, non-tender and non-distended. Normal bowel sounds. No masses.   Extremities Moves all extremities well. No edema   Psychiatric Normal mood and affect.     Testing:  · Download 12/3/19-3/1/20- 81% use with average nightly use of 4 hours and 10 minutes on CPAP 12cm with AHI 2.5.    Study:  · 11/5/19  Overnight split PSG.  Diagnostic study from 10:27pm to 12:13am. Sleep efficiency 83.7% with 1.49 hrs of TST. Sleep distribution shows absence of REM sleep. There is high density of SWS at 41%. AHI index is 32.8 with RDI 36.2. During supine sleep AHI is 57.3 with RDI 61.4. Due to absence of REM sleep severity may be underestimated. O2 saturation fewll below 89% for 11.6% of TST. PLM index is extremely high at 93.9. PLM arousal index is 7.4. Snoring noted for 91.37% of TST.    Titration study from 12:13am to 5:15am. Sleep efficiency 90.4% with 4.54 hrs of TST. Sleep distribution shows a rebound in REM sleep to 18.3% of TST. AHI index improved with CPAP device. O2 saturation remained above 88%. PLM resolved with CPAP " device. CPAP increased from 5 to 18 cwp. Maximum sleep at 13 cwp however slightly higher pressures re required to get AHI < 5. At pressures higher than 15 cms, the AHI index increases factitiously due to oral air leaks.    Impression:  1. Obstructive sleep apnea    2. Obesity (BMI 30-39.9)    3. Air leak    4. Nasal congestion          Plan:  Reduce CPAP to 10cm as she now reports excessive air leak into the eyes.  If persists, try a different mask style. Start Flonase 2 sprays left nostril qhs   Increase humidity to 5 as she reports dry mouth.    Patient uses the CPAP device and benefits from its use in terms of reduction of hypersomnia and snoring.  AHI appears to be within adequate range. I reviewed download report and original sleep study report with the patient.     Weight loss will be strongly beneficial to reduce the severity of sleep-disordered breathing.  Caution during activities that require prolonged concentration is strongly advised if sleepiness returns.     Follow up with Dr. Saravia in 6 months    Thank you for allowing me to participate in your patient's care.      ASHA Damon  [facility]  Phone: [phone]      Part of this note may be an electronic transcription/translation of spoken language to printed text using the Dragon Dictation System.

## 2020-09-04 ENCOUNTER — APPOINTMENT (OUTPATIENT)
Dept: SLEEP MEDICINE | Facility: HOSPITAL | Age: 60
End: 2020-09-04

## 2020-10-16 ENCOUNTER — OFFICE VISIT (OUTPATIENT)
Dept: SLEEP MEDICINE | Facility: HOSPITAL | Age: 60
End: 2020-10-16

## 2020-10-16 VITALS
SYSTOLIC BLOOD PRESSURE: 159 MMHG | BODY MASS INDEX: 33.68 KG/M2 | DIASTOLIC BLOOD PRESSURE: 101 MMHG | WEIGHT: 183 LBS | HEART RATE: 69 BPM | HEIGHT: 62 IN | OXYGEN SATURATION: 96 %

## 2020-10-16 DIAGNOSIS — E66.9 OBESITY (BMI 30-39.9): ICD-10-CM

## 2020-10-16 DIAGNOSIS — G47.33 OBSTRUCTIVE SLEEP APNEA: Primary | ICD-10-CM

## 2020-10-16 PROCEDURE — G0463 HOSPITAL OUTPT CLINIC VISIT: HCPCS

## 2020-10-16 NOTE — PROGRESS NOTES
"[facility] SLEEP MEDICINE  [address] [facility]  [address] [facility] [address]  [phone]    PCP: Eric Lemos MD    Reason for visit:  Sleep disorders: AIMEE    Moriah is a 60 y.o.female who was seen in the Sleep Disorders Center today. She is compliant with her CPAP and benefits. She sleeps from 11pm to 6:30am. She wakes up rested and refreshed. Her nasal mask is comfortable and no leaks. Some dry mouth. No EDS.  [place] Sleepiness Scale is 1. Caffeine 3 per day. Alcohol 0 per week.    Moriah  reports that she quit smoking about 16 years ago. Her smoking use included cigarettes. She quit after 10.00 years of use. She has never used smokeless tobacco.    Pertinent Positive Review of Systems of denies  Rest of Review of Systems was negative as recorded in Sleep Questionnaire.    Patient  has a past medical history of Anxiety and depression, Arthritis, Hypertension, Knee pain, left, and Sleep apnea.     Current Medications:    Current Outpatient Medications:   •  acetaminophen (TYLENOL) 500 MG tablet, Take 500 mg by mouth Every 6 (Six) Hours As Needed for Mild Pain ., Disp: , Rfl:   •  benazepril (LOTENSIN) 40 MG tablet, Take 40 mg by mouth Every Morning., Disp: , Rfl:   •  bisacodyl (DULCOLAX) 5 MG EC tablet, Take 2 tablets by mouth Daily As Needed for Constipation., Disp: 10 tablet, Rfl: 0  •  citalopram (CeleXA) 20 MG tablet, Take 20 mg by mouth Every Morning., Disp: , Rfl:   •  meloxicam (MOBIC) 15 MG tablet, Take 15 mg by mouth Every Morning. HELD FOR OR, Disp: , Rfl:   •  ondansetron (ZOFRAN) 4 MG tablet, Take 1 tablet by mouth Every 6 (Six) Hours As Needed for Nausea or Vomiting., Disp: 30 tablet, Rfl: 0  •  zolpidem (AMBIEN) 5 MG tablet, Bring to sleep lab DO NOT use at home. PLEASE take if not asleep within 30 mins of start of study., Disp: 2 tablet, Rfl: 0   also entered in Sleep Questionnaire         Vital Signs: BP (!) 159/101   Pulse 69   Ht 156.8 cm (61.75\")   Wt 83 kg (183 lb)   " SpO2 96%   BMI 33.74 kg/m²     Body mass index is 33.74 kg/m².       Tongue: Normal       Dentition: good       Pharynx: Posterior pharyngeal pillars are narrow    Mallampatti: II (hard and soft palate, upper portion of tonsils anduvula visible)        General: Alert. Cooperative. Well developed. No acute distress.             Head:  Normocephalic. Symmetrical. Atraumatic.              Nose: No septal deviation. No drainage.          Throat: No oral lesions. No thrush. Moist mucous membranes.    Chest Wall:  Normal shape. Symmetric expansion with respiration. No tenderness.             Neck:  Trachea midline.           Lungs:  Clear to auscultation bilaterally. No wheezes. No rhonchi. No rales. Respirations regular, even and unlabored.            Heart:  Regular rhythm and normal rate. Normal S1 and S2. No murmur.     Abdomen:  Soft, non-tender and non-distended. Normal bowel sounds. No masses.  Extremities:  Moves all extremities well. No edema.    Psychiatric: Normal mood and affect.    Study:  · 11/5/19  Overnight split PSG.  Diagnostic study from 10:27pm to 12:13am. Sleep efficiency 83.7% with 1.49 hrs of TST. Sleep distribution shows absence of REM sleep. There is high density of SWS at 41%. AHI index is 32.8 with RDI 36.2. During supine sleep AHI is 57.3 with RDI 61.4. Due to absence of REM sleep severity may be underestimated. O2 saturation fewll below 89% for 11.6% of TST. PLM index is extremely high at 93.9. PLM arousal index is 7.4. Snoring noted for 91.37% of TST.  Titration study from 12:13am to 5:15am. Sleep efficiency 90.4% with 4.54 hrs of TST. Sleep distribution shows a rebound in REM sleep to 18.3% of TST. AHI index improved with CPAP device. O2 saturation remained above 88%. PLM resolved with CPAP device. CPAP increased from 5 to 18 cwp. Maximum sleep at 13 cwp however slightly higher pressures re required to get AHI < 5. At pressures higher than 15 cms, the AHI index increases factitiously due to  oral air leaks.    Testing:  · 100% compliance average usage 6 hours AHI 2.3 set pressure 10 cm    DME Company: [employer]    Impression:  1. Obstructive sleep apnea    2. Obesity (BMI 30-39.9)        Plan:  Moriah is compliant and benefits from CPAP.  She wakes up rested and refreshed and benefits from the device.  She was reminded to change her supplies regularly.  Otherwise continued good compliance emphasized due to underlying severity of sleep apnea.  Her PLM index was high on her diagnostic study but has resolved with treatment of CPAP machine.  She denies any restlessness.    I reiterated the importance of effective treatment of obstructive sleep apnea with PAP machine.  Cardiovascular health risks of untreated sleep apnea were again reviewed.  Patient was asked to remain cautious if there is persistent hypersomnolence. The benefit of weight loss in reducing severity of obstructive sleep apnea was discussed.  Patient would benefit from adhering to a strict diet to achieve ideal BMI.     Change of PAP supplies regularly is important for effective use.  Change of cushion on the mask or plugs on nasal pillows along with disposable filters once every month and change of mask frame, tubing, headgear and Velcro straps every 6 months at the minimum was reiterated.    This patient is compliant with PAP machine and benefits from its use.  Apnea hypopneas index is corrected/improved.  Daytime hypersomnolence has resolved.     Patient will follow up in this clinic in 1 year APRN    Thank you for allowing me to participate in your patient's care.    Electronically signed by Ayo Saravia MD, 10/16/20, 11:09 AM EDT.    Part of this note may be an electronic transcription/translation of spoken language to printed text using the Dragon Dictation System.

## 2021-10-04 ENCOUNTER — APPOINTMENT (OUTPATIENT)
Dept: SLEEP MEDICINE | Facility: HOSPITAL | Age: 61
End: 2021-10-04

## 2021-10-07 ENCOUNTER — TELEPHONE (OUTPATIENT)
Dept: SLEEP MEDICINE | Facility: HOSPITAL | Age: 61
End: 2021-10-07

## 2021-10-07 ENCOUNTER — OFFICE VISIT (OUTPATIENT)
Dept: SLEEP MEDICINE | Facility: HOSPITAL | Age: 61
End: 2021-10-07

## 2021-10-07 VITALS
SYSTOLIC BLOOD PRESSURE: 113 MMHG | HEIGHT: 61 IN | BODY MASS INDEX: 33.61 KG/M2 | WEIGHT: 178 LBS | DIASTOLIC BLOOD PRESSURE: 71 MMHG | OXYGEN SATURATION: 96 %

## 2021-10-07 DIAGNOSIS — J93.82 AIR LEAK: ICD-10-CM

## 2021-10-07 DIAGNOSIS — E66.9 OBESITY (BMI 30-39.9): ICD-10-CM

## 2021-10-07 DIAGNOSIS — G47.33 OSA (OBSTRUCTIVE SLEEP APNEA): Primary | ICD-10-CM

## 2021-10-07 PROCEDURE — G0463 HOSPITAL OUTPT CLINIC VISIT: HCPCS

## 2021-10-07 NOTE — PROGRESS NOTES
[facility]  Telephone: [phone] / Fax: [phone] [place]  Telephone: [phone] / Fax: [phone] Marilyn Pardo    PCP: Eric Lemos MD    Reason for visit: AIMEE f/u    Moriah Weeks is a 61 y.o.female  was last seen at [facility] sleep lab in October 2020. She has been using [employer]Station CPAP with nasal mask but notices increase in leak and wants to change to Dream[employer]ear nasal pillows. I have placed order to [employer]. She will also explore masks online. She has not yet registered her CPAP with [employer]. Her CPAP is set at 10cm, residual AHI is 2.2.   Sleep quality has improved on CPAP and excessive sleepiness/snoring is no longer an issue.  There is no complaint of aerophagia, excessive dry mouth or air leak.    SH- no tob, no alcohol    ROS- negative    DME [employer]    Current Medications:    Current Outpatient Medications:   •  acetaminophen (TYLENOL) 500 MG tablet, Take 500 mg by mouth Every 6 (Six) Hours As Needed for Mild Pain ., Disp: , Rfl:   •  benazepril (LOTENSIN) 40 MG tablet, Take 40 mg by mouth Every Morning., Disp: , Rfl:   •  bisacodyl (DULCOLAX) 5 MG EC tablet, Take 2 tablets by mouth Daily As Needed for Constipation., Disp: 10 tablet, Rfl: 0  •  citalopram (CeleXA) 20 MG tablet, Take 20 mg by mouth Every Morning., Disp: , Rfl:   •  meloxicam (MOBIC) 15 MG tablet, Take 15 mg by mouth Every Morning. HELD FOR OR, Disp: , Rfl:   •  ondansetron (ZOFRAN) 4 MG tablet, Take 1 tablet by mouth Every 6 (Six) Hours As Needed for Nausea or Vomiting., Disp: 30 tablet, Rfl: 0  •  zolpidem (AMBIEN) 5 MG tablet, Bring to sleep lab DO NOT use at home. PLEASE take if not asleep within 30 mins of start of study., Disp: 2 tablet, Rfl: 0   also entered in Sleep Questionnaire    Patient  has a past medical history of Anxiety and depression, Arthritis, Hypertension, Knee pain, left, and Sleep apnea.    I have reviewed the Past Medical History, Past Surgical History, Social History and Family  "History.    Vital Signs /71   Ht 154.9 cm (61\")   Wt 80.7 kg (178 lb)   SpO2 96%   BMI 33.63 kg/m²  Body mass index is 33.63 kg/m².    General Alert and oriented. No acute distress noted   Pharynx/Throat Class II Mallampati airway, large tongue, no evidence of redundant lateral pharyngeal tissue. No oral lesions. No thrush. Moist mucous membranes.   Head Normocephalic. Symmetrical. Atraumatic.    Nose No septal deviation. No drainage   Chest Wall Normal shape. Symmetric expansion with respiration. No tenderness.   Neck Trachea midline, no thyromegaly or adenopathy    Lungs Clear to auscultation bilaterally. No wheezes. No rhonchi. No rales. Respirations regular, even and unlabored.   Heart Regular rhythm and normal rate. Normal S1 and S2. No murmur   Abdomen Soft, non-tender and non-distended. Normal bowel sounds. No masses.   Extremities Moves all extremities well. No edema   Psychiatric Normal mood and affect.     Testing:  · Download last 90 d 98% use with avg nightly use of 4 hours and 19 min on CPAP 10cm, AHI 2.2, leak 0    Study:  · 11/5/19  Overnight split PSG.  Diagnostic study from 10:27pm to 12:13am. Sleep efficiency 83.7% with 1.49 hrs of TST. Sleep distribution shows absence of REM sleep. There is high density of SWS at 41%. AHI index is 32.8 with RDI 36.2. During supine sleep AHI is 57.3 with RDI 61.4. Due to absence of REM sleep severity may be underestimated. O2 saturation fewll below 89% for 11.6% of TST. PLM index is extremely high at 93.9. PLM arousal index is 7.4. Snoring noted for 91.37% of TST.     Titration study from 12:13am to 5:15am. Sleep efficiency 90.4% with 4.54 hrs of TST. Sleep distribution shows a rebound in REM sleep to 18.3% of TST. AHI index improved with CPAP device. O2 saturation remained above 88%. PLM resolved with CPAP device. CPAP increased from 5 to 18 cwp. Maximum sleep at 13 cwp however slightly higher pressures re required to get AHI < 5. At pressures higher " than 15 cms, the AHI index increases factitiously due to oral air leaks.    Impression:  1. AIMEE (obstructive sleep apnea)    2. Air leak    3. Obesity (BMI 30-39.9)          Plan:  I discussed recent [employer] recall with patient. I explained the short and long term effects of polyester-based polyurethane(PE-PUR) foam degradation. I have also discussed with patient the consequences of untreated moderate-to severe AIMEE .Patient was asked to register her device with [facility] to see if it is affected by recent recall. If it is, the device needs to be replaced.  Patient was given information regarding registration on [employer] website. She was asked to stop the humidifier.    Patient uses the CPAP device and benefits from its use in terms of reduction of hypersomnia and snoring.  AHI appears to be within adequate range. I reviewed download report and original sleep study report with the patient.     Weight loss will be strongly beneficial to reduce the severity of sleep-disordered breathing.  Caution during activities that require prolonged concentration is strongly advised if sleepiness returns. Changing of PAP supplies regularly is important for effective use.      Follow up with Dr. Saravia in one year    Thank you for allowing me to participate in your patient's care.      ASHA Damon  [facility]  Phone: [phone]      Part of this note may be an electronic transcription/translation of spoken language to printed text using the Dragon Dictation System.

## 2022-10-07 ENCOUNTER — OFFICE VISIT (OUTPATIENT)
Dept: SLEEP MEDICINE | Facility: HOSPITAL | Age: 62
End: 2022-10-07

## 2022-10-07 VITALS
DIASTOLIC BLOOD PRESSURE: 82 MMHG | SYSTOLIC BLOOD PRESSURE: 127 MMHG | WEIGHT: 185.2 LBS | HEIGHT: 61 IN | BODY MASS INDEX: 34.97 KG/M2

## 2022-10-07 DIAGNOSIS — R09.81 NASAL CONGESTION: ICD-10-CM

## 2022-10-07 DIAGNOSIS — G47.33 OBSTRUCTIVE SLEEP APNEA: Primary | ICD-10-CM

## 2022-10-07 DIAGNOSIS — E66.9 OBESITY (BMI 30-39.9): ICD-10-CM

## 2022-10-07 PROCEDURE — G0463 HOSPITAL OUTPT CLINIC VISIT: HCPCS

## 2022-10-07 NOTE — PROGRESS NOTES
"[facility] SLEEP MEDICINE  [address]  [address]  [place] [address] [phone]    PCP: Eric Lemos MD    Reason for visit:  Sleep disorders: AIMEE    Moriah is a 62 y.o.female who was seen in the Sleep Disorders Center today. Annual fu. She changed to nasal cushions and they fit well. Sleeps from 11pm to 6:30am. Wakes up rested. Has nasal congestion, uses flonase in morning, advised to use before bed. She got replacement from [employer].  [place] Sleepiness Scale is 2. Caffeine 2 per day. Alcohol 0 per week.    Moirah  reports that she quit smoking about 18 years ago. Her smoking use included cigarettes. She has never used smokeless tobacco.    Pertinent Positive Review of Systems of denies  Rest of Review of Systems was negative as recorded in Sleep Questionnaire.    Patient  has a past medical history of Anxiety and depression, Arthritis, Hypertension, Knee pain, left, and Sleep apnea.     Current Medications:    Current Outpatient Medications:   •  acetaminophen (TYLENOL) 500 MG tablet, Take 500 mg by mouth Every 6 (Six) Hours As Needed for Mild Pain ., Disp: , Rfl:   •  benazepril (LOTENSIN) 40 MG tablet, Take 40 mg by mouth Every Morning., Disp: , Rfl:   •  bisacodyl (DULCOLAX) 5 MG EC tablet, Take 2 tablets by mouth Daily As Needed for Constipation., Disp: 10 tablet, Rfl: 0  •  citalopram (CeleXA) 20 MG tablet, Take 20 mg by mouth Every Morning., Disp: , Rfl:   •  meloxicam (MOBIC) 15 MG tablet, Take 15 mg by mouth Every Morning. HELD FOR OR, Disp: , Rfl:   •  ondansetron (ZOFRAN) 4 MG tablet, Take 1 tablet by mouth Every 6 (Six) Hours As Needed for Nausea or Vomiting., Disp: 30 tablet, Rfl: 0  •  zolpidem (AMBIEN) 5 MG tablet, Bring to sleep lab DO NOT use at home. PLEASE take if not asleep within 30 mins of start of study., Disp: 2 tablet, Rfl: 0   also entered in Sleep Questionnaire         Vital Signs: /82   Ht 154.9 cm (61\")   Wt 84 kg (185 lb 3.2 oz)   BMI 34.99 " kg/m²     Body mass index is 34.99 kg/m².       Tongue: Normal       Dentition: good       Pharynx: Posterior pharyngeal pillars are wide   Mallampatti: I (soft palate, uvula, fauces, and tonsillar pillars visible)        General: Alert. Cooperative. Well developed. No acute distress.             Head:  Normocephalic. Symmetrical. Atraumatic.              Nose: No septal deviation. No drainage.          Throat: No oral lesions. No thrush. Moist mucous membranes.    Chest Wall:  Normal shape. Symmetric expansion with respiration. No tenderness.             Neck:  Trachea midline.           Lungs:  Clear to auscultation bilaterally. No wheezes. No rhonchi. No rales. Respirations regular, even and unlabored.            Heart:  Regular rhythm and normal rate. Normal S1 and S2. No murmur.     Abdomen:  Soft, non-tender and non-distended. Normal bowel sounds. No masses.  Extremities:  Moves all extremities well. No edema.    Psychiatric: Normal mood and affect.    Diagnostic data available to date is as below and was reviewed on current visit:  · 11/5/19  Overnight split PSG.  Diagnostic study from 10:27pm to 12:13am. Sleep efficiency 83.7% with 1.49 hrs of TST. Sleep distribution shows absence of REM sleep. There is high density of SWS at 41%. AHI index is 32.8 with RDI 36.2. During supine sleep AHI is 57.3 with RDI 61.4. Due to absence of REM sleep severity may be underestimated. O2 saturation fewll below 89% for 11.6% of TST. PLM index is extremely high at 93.9. PLM arousal index is 7.4. Snoring noted for 91.37% of TST.  Titration study from 12:13am to 5:15am. Sleep efficiency 90.4% with 4.54 hrs of TST. Sleep distribution shows a rebound in REM sleep to 18.3% of TST. AHI index improved with CPAP device. O2 saturation remained above 88%. PLM resolved with CPAP device. CPAP increased from 5 to 18 cwp. Maximum sleep at 13 cwp however slightly higher pressures re required to get AHI < 5. At pressures higher than 15 cms,  the AHI index increases factitiously due to oral air leaks.    Most current available usage data reviewed on 10/07/2022:  · 100% compliance average 3 hours 55 minutes AHI 5.8 with current set pressure 10 cm    DME Company: [employer]    Prescription to [facility] for replacement supplies as below:    nasal mask      Description Replacement    Nasal PILLOWS      A 7034 Nasal Pillows  every 3 mth    A 7033 Repl Nasal Pillows  2 per mth    Nasal MASK/CUSHION     x A 7034 Nasal Mask/Cushion  every 3 mth   x A 7032 Repl Nasal Mask/Cushion  2 per mth    Full Face MASK      A 7030 Full Face Mask  every 3 mth    A 7031 Repl Face Mask  1 per mth      A 4604 Heated Tubing  every 3 mth    A 7037 Standard Tubing  every 3 mth   x A 7035 Headgear  every 3 mth   x A 7046 Repl Humidifier Chamber  every 6 yrs   x A 7038 Disposable Filters  2 per mth   x A 7039 Non-disposable Filter  every 6 mth   x A 7036 Chin Strap  every 6 mth         No orders of the defined types were placed in this encounter.         Impression:  1. Obstructive sleep apnea    2. Obesity (BMI 30-39.9)    3. Nasal congestion        Plan:  Moriah is compliant. Increase ahi for unclear reasons. Weight is same. Change to auto 8-15.    She has nasal congestion and is using Flonase in the morning.  Advised to do so before bed.    I reiterated the importance of effective treatment of obstructive sleep apnea with PAP machine.  Cardiovascular health risks of untreated sleep apnea were again reviewed.  Patient was asked to remain cautious if there is persistent hypersomnolence. The benefit of weight loss in reducing severity of obstructive sleep apnea was discussed.  Patient would benefit from adhering to a strict diet to achieve ideal BMI.     Change of PAP supplies regularly is important for effective use.  Change of cushion on the mask or plugs on nasal pillows along with disposable filters once every month and change of mask frame, tubing, headgear and Velcro straps every 6  months at the minimum was reiterated.    This patient is compliant with PAP machine and benefits from its use.  Apnea hypopneas index is corrected/improved.  Daytime hypersomnolence has resolved.     Patient will follow up in this clinic in 1 year aprn    Thank you for allowing me to participate in your patient's care.    Electronically signed by Ayo Saravia MD, 10/07/22, 7:31 AM EDT.    Part of this note may be an electronic transcription/translation of spoken language to printed text using the Dragon Dictation System.

## 2023-11-13 ENCOUNTER — OFFICE VISIT (OUTPATIENT)
Dept: SLEEP MEDICINE | Facility: HOSPITAL | Age: 63
End: 2023-11-13
Payer: COMMERCIAL

## 2023-11-13 VITALS
OXYGEN SATURATION: 91 % | BODY MASS INDEX: 34.93 KG/M2 | HEART RATE: 73 BPM | DIASTOLIC BLOOD PRESSURE: 70 MMHG | WEIGHT: 185 LBS | SYSTOLIC BLOOD PRESSURE: 105 MMHG | HEIGHT: 61 IN

## 2023-11-13 DIAGNOSIS — E66.9 OBESITY (BMI 30-39.9): ICD-10-CM

## 2023-11-13 DIAGNOSIS — G47.33 OBSTRUCTIVE SLEEP APNEA: Primary | ICD-10-CM

## 2023-11-13 PROCEDURE — G0463 HOSPITAL OUTPT CLINIC VISIT: HCPCS

## 2023-11-13 NOTE — PROGRESS NOTES
[facility]  Telephone: [phone] / Fax: [phone] [place]  Telephone: [phone] / Fax: [phone] Marilyn Pardo    PCP: Eric Lemos MD    Reason for visit: AIMEE f/u    Moriah Weeks is a 63 y.o.female  was last seen at [facility] sleep lab in Oct 2022. Last visit CPAP pressures were changed to 8-15cm H2O to help improve slightly elevated residual AHI. She is here today to review response. Moriah tolerates higher CPAP pressures.  She finds the machine helpful and is in compliance despite not liking it.  If she does not use the machine she has increased snoring and daytime sleepiness. She had some questions about inspire today which I helped answer. After our discussion pt prefers to stay on CPAP. Her sleep schedule is 11pm-6:30am. ESS is 8.    SH- no tobacco, no alcohol, 3 caffeine per day, no energy drinks.    ROS- negative.    DME [employer].    Current Medications:    Current Outpatient Medications:     acetaminophen (TYLENOL) 500 MG tablet, Take 500 mg by mouth Every 6 (Six) Hours As Needed for Mild Pain ., Disp: , Rfl:     benazepril (LOTENSIN) 40 MG tablet, Take 40 mg by mouth Every Morning., Disp: , Rfl:     bisacodyl (DULCOLAX) 5 MG EC tablet, Take 2 tablets by mouth Daily As Needed for Constipation., Disp: 10 tablet, Rfl: 0    citalopram (CeleXA) 20 MG tablet, Take 20 mg by mouth Every Morning., Disp: , Rfl:     meloxicam (MOBIC) 15 MG tablet, Take 15 mg by mouth Every Morning. HELD FOR OR, Disp: , Rfl:     ondansetron (ZOFRAN) 4 MG tablet, Take 1 tablet by mouth Every 6 (Six) Hours As Needed for Nausea or Vomiting., Disp: 30 tablet, Rfl: 0    zolpidem (AMBIEN) 5 MG tablet, Bring to sleep lab DO NOT use at home. PLEASE take if not asleep within 30 mins of start of study., Disp: 2 tablet, Rfl: 0   also entered in Sleep Questionnaire    Patient  has a past medical history of Anxiety and depression, Arthritis, Hypertension, Knee pain, left, and Sleep apnea.    I have reviewed  "the Past Medical History, Past Surgical History, Social History and Family History.    Vital Signs /70   Pulse 73   Ht 154.9 cm (61\")   Wt 83.9 kg (185 lb)   SpO2 91%   BMI 34.96 kg/m²  Body mass index is 34.96 kg/m².    General Alert and oriented. No acute distress noted   Pharynx/Throat Class  II  Mallampati airway, large tongue, no evidence of redundant lateral pharyngeal tissue. No oral lesions. No thrush. Moist mucous membranes.   Head Normocephalic. Symmetrical. Atraumatic.    Nose No septal deviation. No drainage   Chest Wall Normal shape. Symmetric expansion with respiration. No tenderness.   Neck Trachea midline, no thyromegaly or adenopathy    Lungs Clear to auscultation bilaterally. No wheezes. No rhonchi. No rales. Respirations regular, even and unlabored.   Heart Regular rhythm and normal rate. Normal S1 and S2. No murmur   Abdomen Soft, non-tender and non-distended. Normal bowel sounds. No masses.   Extremities Moves all extremities well. No edema   Psychiatric Normal mood and affect.     Testing:  Download 8/11/23-11/8/23 62% use with average nightly use of 4 hours and 1 minute on auto CPAP 8-15cm H2O, avg pr is 15cm H2O, AHI 3.8/hr, leak is 6 min and 11 seconds.    Study-Diagnostic data available to date is as below and was reviewed on current visit:  11/5/19  Overnight split PSG.  Diagnostic study from 10:27pm to 12:13am. Sleep efficiency 83.7% with 1.49 hrs of TST. Sleep distribution shows absence of REM sleep. There is high density of SWS at 41%. AHI index is 32.8 with RDI 36.2. During supine sleep AHI is 57.3 with RDI 61.4. Due to absence of REM sleep severity may be underestimated. O2 saturation fewll below 89% for 11.6% of TST. PLM index is extremely high at 93.9. PLM arousal index is 7.4. Snoring noted for 91.37% of TST.    Titration study from 12:13am to 5:15am. Sleep efficiency 90.4% with 4.54 hrs of TST. Sleep distribution shows a rebound in REM sleep to 18.3% of TST. AHI index " improved with CPAP device. O2 saturation remained above 88%. PLM resolved with CPAP device. CPAP increased from 5 to 18 cwp. Maximum sleep at 13 cwp however slightly higher pressures re required to get AHI < 5. At pressures higher than 15 cms, the AHI index increases factitiously due to oral air leaks.    Impression:  1. Obstructive sleep apnea    2. Obesity (BMI 30-39.9)          Plan:  Pt was advised to increase compliance to nightly usage and keep the mask on at least 4 hours per night for optimal benefit. Otherwise she is doing well on auto 8-15cm H2O. I reviewed download report and original sleep study report with the patient. I advised pt to contact us if PAP pressures feel excessive or insufficient.    Weight loss will be strongly beneficial to reduce the severity of sleep-disordered breathing.  Caution during activities that require prolonged concentration is strongly advised if sleepiness returns.       Follow up with Dr. Saravia in one year    Thank you for allowing me to participate in your patient's care.      ASHA Damon  [facility]  Phone: [phone]      Part of this note may be an electronic transcription/translation of spoken language to printed text using the Dragon Dictation System.

## 2024-07-08 NOTE — DISCHARGE INSTRUCTIONS
2% CHLORAHEXIDINE GLUCONATE* CLOTH  Preparing or “prepping” skin before surgery can reduce the risk of infection at the surgical site. To make the process easier, [facility] has chosen disposable cloths moistened with a rinse-free, 2% Chlorhexidine Gluconate (CHG) antiseptic solution. The steps below outline the prepping process and should be carefully followed.        Use the prep cloth on the area that is circled in the diagram             Directions Night before Surgery  1) Shower using a fresh bar of anti-bacterial soap (such as Dial) and clean washcloth.  Use a clean towel to completely dry your skin.  2) Do not use any lotions, oils or creams on your skin.  3) Open the package and remove 1 cloth, wipe your skin for 30 seconds in a circular motion.  Allow to dry for 3 minutes.  4) Repeat #3 with second cloth.  5) Do not touch your eyes, ears, or mouth with the prep cloth.  6) Allow the wet prep solution to air dry.  7) Discard the prep cloth and wash your hands with soap and water.   8) Dress in clean bed clothes and sleep on fresh clean bed sheets.   9) You may experience some temporary itching after the prep.    Directions Day of Surgery  1) Repeat steps 1,2,3,4,5,6,7, and 9.   2) Dress in clean clothes before coming to the hospital.    BACTROBAN NASAL OINTMENT  There are many germs normally in your nose. Bactroban is an ointment that will help reduce these germs. Please follow these instructions for Bactroban use:      ____The day before surgery in the morning  Date________    ____The day before surgery in the evening              Date________    ____The day of surgery in the morning    Date________    **Squirt ½ package of Bactroban Ointment onto a cotton applicator and apply to inside of 1st nostril.  Squirt the remaining Bactroban and apply to the inside of the other nostril.      Take the following medications the morning of surgery with a small sip of water:  NONE    ARRIVAL TIME 0515  Peripheral IV removed without complications. AVS reviewed with patient and son. All questions answered.    TO MAIN OR         General Instructions:  • Do not eat solid food after midnight the night before surgery.  • You may drink clear liquids day of surgery but must stop at least one hour before your hospital arrival time - CUTOFF TIME 0415  • It is beneficial for you to have a clear drink that contains carbohydrates the day of surgery.  We suggest a 12 to 20 ounce bottle of Gatorade or Powerade for non-diabetic patients or a 12 to 20 ounce bottle of G2 or Powerade Zero for diabetic patients. (Pediatric patients, are not advised to drink a 12 to 20 ounce carbohydrate drink)    Clear liquids are liquids you can see through.  Nothing red in color.     Plain water                               Sports drinks  Sodas                                   Gelatin (Jell-O)  Fruit juices without pulp such as white grape juice and apple juice  Popsicles that contain no fruit or yogurt  Tea or coffee (no cream or milk added)  Gatorade / Powerade  G2 / Powerade Zero    • Infants may have breast milk up to four hours before surgery.  • Infants drinking formula may drink formula up to six hours before surgery.   • Patients who avoid smoking, chewing tobacco and alcohol for 4 weeks prior to surgery have a reduced risk of post-operative complications.  Quit smoking as many days before surgery as you can.  • Do not smoke, use chewing tobacco or drink alcohol the day of surgery.   • If applicable bring your C-PAP/ BI-PAP machine.  • Bring any papers given to you in the doctor’s office.  • Wear clean comfortable clothes and socks.  • Do not wear contact lenses, false eyelashes or make-up.  Bring a case for your glasses.   • Bring crutches or walker if applicable.  • Remove all piercings.  Leave jewelry and any other valuables at home.  • Hair extensions with metal clips must be removed prior to surgery.  • The Pre-Admission Testing nurse will instruct you to bring medications if unable to obtain an accurate list in Pre-Admission Testing.             Preventing a Surgical Site Infection:  • For 2 to 3 days before surgery, avoid shaving with a razor because the razor can irritate skin and make it easier to develop an infection.    • Any areas of open skin can increase the risk of a post-operative wound infection by allowing bacteria to enter and travel throughout the body.  Notify your surgeon if you have any skin wounds / rashes even if it is not near the expected surgical site.  The area will need assessed to determine if surgery should be delayed until it is healed.  • The night prior to surgery sleep in a clean bed with clean clothing.  Do not allow pets to sleep with you.  • Shower on the morning of surgery using a fresh bar of anti-bacterial soap (such as Dial) and clean washcloth.  Dry with a clean towel and dress in clean clothing.  • Ask your surgeon if you will be receiving antibiotics prior to surgery.  • Make sure you, your family, and all healthcare providers clean their hands with soap and water or an alcohol based hand  before caring for you or your wound.    Day of surgery:  Upon arrival, a Pre-op nurse and Anesthesiologist will review your health history, obtain vital signs, and answer questions you may have.  The only belongings needed at this time will be a list of your home medications and if applicable your C-PAP/BI-PAP machine.  If you are staying overnight your family can leave the rest of your belongings in the car and bring them to your room later.  A Pre-op nurse will start an IV and you may receive medication in preparation for surgery, including something to help you relax.  Your family will be able to see you in the Pre-op area.  While you are in surgery your family should notify the waiting room  if they leave the waiting room area and provide a contact phone number.    Please be aware that surgery does come with discomfort.  We want to make every effort to control your discomfort so please discuss any  uncontrolled symptoms with your nurse.   Your doctor will most likely have prescribed pain medications.      If you are going home after surgery you will receive individualized written care instructions before being discharged.  A responsible adult must drive you to and from the hospital on the day of your surgery and stay with you for 24 hours.    If you are staying overnight following surgery, you will be transported to your hospital room following the recovery period.  [facility] has all private rooms.    You have received a list of surgical assistants for your reference.  If you have any questions please call Pre-Admission Testing at [phone].  Deductibles and co-payments are collected on the day of service. Please be prepared to pay the required co-pay, deductible or deposit on the day of service as defined by your plan.

## 2024-11-19 ENCOUNTER — OFFICE VISIT (OUTPATIENT)
Dept: SLEEP MEDICINE | Facility: HOSPITAL | Age: 64
End: 2024-11-19
Payer: COMMERCIAL

## 2024-11-19 VITALS
SYSTOLIC BLOOD PRESSURE: 165 MMHG | HEIGHT: 61 IN | DIASTOLIC BLOOD PRESSURE: 93 MMHG | OXYGEN SATURATION: 92 % | BODY MASS INDEX: 34.93 KG/M2 | WEIGHT: 185 LBS | HEART RATE: 85 BPM

## 2024-11-19 DIAGNOSIS — R68.2 DRY MOUTH: ICD-10-CM

## 2024-11-19 DIAGNOSIS — Z91.199 POOR COMPLIANCE WITH CPAP TREATMENT: ICD-10-CM

## 2024-11-19 DIAGNOSIS — E66.9 OBESITY (BMI 30-39.9): ICD-10-CM

## 2024-11-19 DIAGNOSIS — G47.33 OBSTRUCTIVE SLEEP APNEA, ADULT: Primary | ICD-10-CM

## 2024-11-19 DIAGNOSIS — Z78.9 DIFFICULTY WITH CPAP USE: ICD-10-CM

## 2024-11-19 PROCEDURE — G0463 HOSPITAL OUTPT CLINIC VISIT: HCPCS

## 2024-11-19 NOTE — PROGRESS NOTES
"[facility]  [address]  [place] [address] [phone]    PCP: Eric Lemos MD    Reason for visit:  Sleep disorders: AIMEE    Moriah is a 64 y.o.female who was seen in the Sleep Disorders Center today. Annual fu. She sleeps from 11pm to 6:30am. Lately not compliant with device. Has dry mouth. Gets nasal congestion. Using nasal pillows. Sleeps from 11pm to 6:30am. Her humidifier is off. She does NOT want HNS. She does admit to feeling more rested when she uses the CPAP device. She feels her sleep is very restless - has hip arthritis.  [place] Sleepiness Scale is 3. Caffeine 2 per day. Alcohol 0 per week.    Moriah  reports that she quit smoking about 20 years ago. Her smoking use included cigarettes. She started smoking about 30 years ago. She has never used smokeless tobacco.    Pertinent Positive Review of Systems of cough  Rest of Review of Systems was negative as recorded in Sleep Questionnaire.    Patient  has a past medical history of Anxiety and depression, Arthritis, Hypertension, Knee pain, left, and Sleep apnea.     Current Medications:    Current Outpatient Medications:     acetaminophen (TYLENOL) 500 MG tablet, Take 500 mg by mouth Every 6 (Six) Hours As Needed for Mild Pain ., Disp: , Rfl:     benazepril (LOTENSIN) 40 MG tablet, Take 40 mg by mouth Every Morning., Disp: , Rfl:     bisacodyl (DULCOLAX) 5 MG EC tablet, Take 2 tablets by mouth Daily As Needed for Constipation., Disp: 10 tablet, Rfl: 0    citalopram (CeleXA) 20 MG tablet, Take 20 mg by mouth Every Morning., Disp: , Rfl:     meloxicam (MOBIC) 15 MG tablet, Take 15 mg by mouth Every Morning. HELD FOR OR, Disp: , Rfl:     ondansetron (ZOFRAN) 4 MG tablet, Take 1 tablet by mouth Every 6 (Six) Hours As Needed for Nausea or Vomiting., Disp: 30 tablet, Rfl: 0   also entered in Sleep Questionnaire         Vital Signs: /93   Pulse 85   Ht 154.9 cm (61\")   Wt 83.9 kg (185 lb)   SpO2 " "92%   BMI 34.96 kg/m²     Body mass index is 34.96 kg/m².       Tongue: Large       Dentition: good       Pharynx: Posterior pharyngeal pillars are narrow   Mallampatti: III (soft and hard palate and base of uvula visible)        General: Alert. Cooperative. Well developed. No acute distress.             Nose: No septal deviation. No drainage.          Throat: No oral lesions. No thrush. Moist mucous membranes.           Lungs:  Clear to auscultation bilaterally.            Heart:  Regular rhythm and normal rate. No murmur.     Diagnostic data available to date is as below and was reviewed on current visit:  11/5/19 : Overnight split PSG.  Diagnostic study from 10:27pm to 12:13am. Sleep efficiency 83.7% with 1.49 hrs of TST. Sleep distribution shows absence of REM sleep. There is high density of SWS at 41%. AHI index is 32.8 with RDI 36.2. During supine sleep AHI is 57.3 with RDI 61.4. Due to absence of REM sleep severity may be underestimated. O2 saturation fewll below 89% for 11.6% of TST. PLM index is extremely high at 93.9. PLM arousal index is 7.4. Snoring noted for 91.37% of TST.  Titration study from 12:13am to 5:15am. Sleep efficiency 90.4% with 4.54 hrs of TST. Sleep distribution shows a rebound in REM sleep to 18.3% of TST. AHI index improved with CPAP device. O2 saturation remained above 88%. PLM resolved with CPAP device. CPAP increased from 5 to 18 cwp. Maximum sleep at 13 cwp however slightly higher pressures re required to get AHI < 5. At pressures higher than 15 cms, the AHI index increases factitiously due to oral air leaks.    No results found for: \"IRON\", \"TIBC\", \"FERRITIN\"    Most current available usage data reviewed on 11/19/2024:        [employer]: [employer]    Prescription to [employer] for replacement supplies as below:    nasal pillow      Description Replacement    Nasal PILLOWS     x A 7034 Nasal Pillows  every 3 mth   x A 7033 Repl Nasal Pillows  2 per mth    Nasal MASK/CUSHION      A 7034 Nasal " Mask/Cushion  every 3 mth    A 7032 Repl Nasal Mask/Cushion  2 per mth    Full Face MASK      A 7030 Full Face Mask  every 3 mth    A 7031 Repl Face Mask  1 per mth      A 4604 Heated Tubing  every 3 mth    A 7037 Standard Tubing  every 3 mth   x A 7035 Headgear  every 3 mth   x A 7046 Repl Humidifier Chamber  every 6 yrs   x A 7038 Disposable Filters  2 per mth   x A 7039 Non-disposable Filter  every 6 mth   x A 7036 Chin Strap  every 6 mth     Orders Placed This Encounter   Procedures    Pulmonary Results Scan          Impression:  1. Obstructive sleep apnea, adult    2. Obesity (BMI 30-39.9)    3. Dry mouth    4. Poor compliance with CPAP treatment    5. Difficulty with CPAP use        Plan:  Moriah has poor compliance with CPAP. I reviewed her sleep study with her again. Can use Tylenol qhs for hip pain. I don't think she needs PLMD meds yet. She does not want HNS.  She is having excessive dry mouth but her humidifier appears to be off.  Switch on humidifier and use heated tube.  If persistent symptoms then can use chinstrap.    I reiterated the importance of effective treatment of obstructive sleep apnea with PAP machine.  Cardiovascular health risks of untreated sleep apnea were again reviewed.  Patient was asked to remain cautious if there is persistent hypersomnolence. The benefit of weight loss in reducing severity of obstructive sleep apnea was discussed.  Patient would benefit from adhering to a strict diet to achieve ideal BMI.     Change of PAP supplies regularly is important for effective use.  Change of cushion on the mask or plugs on nasal pillows along with disposable filters once every month and change of mask frame, tubing, headgear and Velcro straps every 6 months at the minimum was reiterated.    Patient will follow up in this clinic in 6 months  APRN    Thank you for allowing me to participate in your patient's care.    Electronically signed by Ayo Saravia MD, 11/19/24, 9:20 AM EST.    Part of  this note may be an electronic transcription/translation of spoken language to printed text using the Dragon Dictation System.

## 2024-12-19 ENCOUNTER — TELEPHONE (OUTPATIENT)
Dept: SLEEP MEDICINE | Facility: HOSPITAL | Age: 64
End: 2024-12-19
Payer: COMMERCIAL

## 2024-12-19 NOTE — TELEPHONE ENCOUNTER
Pt is requesting an order for a new Resmed cpap machine. She was just recently seen. Message sent to Karen YARBROUGH for an order. Will fax to [employer] once received.

## 2024-12-23 ENCOUNTER — TELEPHONE (OUTPATIENT)
Dept: SLEEP MEDICINE | Facility: HOSPITAL | Age: 64
End: 2024-12-23
Payer: COMMERCIAL

## 2025-05-19 ENCOUNTER — OFFICE VISIT (OUTPATIENT)
Dept: SLEEP MEDICINE | Facility: HOSPITAL | Age: 65
End: 2025-05-19
Payer: COMMERCIAL

## 2025-05-19 VITALS
OXYGEN SATURATION: 92 % | WEIGHT: 185 LBS | HEIGHT: 61 IN | BODY MASS INDEX: 34.93 KG/M2 | SYSTOLIC BLOOD PRESSURE: 129 MMHG | HEART RATE: 76 BPM | DIASTOLIC BLOOD PRESSURE: 83 MMHG

## 2025-05-19 DIAGNOSIS — R68.2 DRY MOUTH: ICD-10-CM

## 2025-05-19 DIAGNOSIS — G47.33 OBSTRUCTIVE SLEEP APNEA, ADULT: Primary | ICD-10-CM

## 2025-05-19 DIAGNOSIS — Z78.9 DIFFICULTY WITH CPAP USE: ICD-10-CM

## 2025-05-19 PROCEDURE — G0463 HOSPITAL OUTPT CLINIC VISIT: HCPCS

## 2025-05-19 NOTE — PROGRESS NOTES
"[facility]  Telephone: [phone] / Fax: [phone] [place]  Telephone: [phone] / Fax: [phone] Marilyn Pardo    PCP: Eric Lemos MD    Reason for visit: AIMEE f/u    Moriah Weeks is a 65 y.o.female  was last seen at [facility] sleep lab in  November 2024. She continues to struggle with CPAP device despite using the least invasive mask. She reports instances of leaks at night when pressures ramp up too high.  She has severe underlying AIMEE with pre treatment AHI 32/hr, supine AHI 57/hr. AHI on treatment with CPAP 8-15cm H2O is down to 4.3/hr.    SH- no tobacco, no alcohol, 3 caffeine per day, no energy drinks.    ROS- negative.    DME [employer]    Current Medications:    Current Outpatient Medications:     acetaminophen (TYLENOL) 500 MG tablet, Take 500 mg by mouth Every 6 (Six) Hours As Needed for Mild Pain ., Disp: , Rfl:     benazepril (LOTENSIN) 40 MG tablet, Take 40 mg by mouth Every Morning., Disp: , Rfl:     bisacodyl (DULCOLAX) 5 MG EC tablet, Take 2 tablets by mouth Daily As Needed for Constipation., Disp: 10 tablet, Rfl: 0    citalopram (CeleXA) 20 MG tablet, Take 20 mg by mouth Every Morning., Disp: , Rfl:     meloxicam (MOBIC) 15 MG tablet, Take 15 mg by mouth Every Morning. HELD FOR OR, Disp: , Rfl:     ondansetron (ZOFRAN) 4 MG tablet, Take 1 tablet by mouth Every 6 (Six) Hours As Needed for Nausea or Vomiting., Disp: 30 tablet, Rfl: 0   also entered in Sleep Questionnaire    Patient  has a past medical history of Anxiety and depression, Arthritis, Hypertension, Knee pain, left, and Sleep apnea.    I have reviewed the Past Medical History, Past Surgical History, Social History and Family History.    Vital Signs /83   Pulse 76   Ht 154.9 cm (61\")   Wt 83.9 kg (185 lb)   SpO2 92%   BMI 34.96 kg/m²  Body mass index is 34.96 kg/m².    General Alert and oriented. No acute distress noted   Pharynx/Throat Class III  Mallampati airway, large tongue, no evidence of " redundant lateral pharyngeal tissue. No oral lesions. No thrush. Moist mucous membranes.   Head Normocephalic. Symmetrical. Atraumatic.    Nose No septal deviation. No drainage   Chest Wall Normal shape. Symmetric expansion with respiration. No tenderness.   Neck Trachea midline, no thyromegaly or adenopathy    Lungs Clear to auscultation bilaterally. No wheezes. No rhonchi. No rales. Respirations regular, even and unlabored.   Heart Regular rhythm and normal rate. Normal S1 and S2. No murmur   Abdomen Soft, non-tender and non-distended. Normal bowel sounds. No masses.   Extremities Moves all extremities well. No edema   Psychiatric Normal mood and affect.     Testing:  Download 2/15/25-5/15/25 77% use with average nightly use of 3 hours and 6 minutes on auto CPAP 8-15cm H2O, avg pressure 13.7cm H2O, AHI 4.3/hr, leak is 24.1 L/min.    Study-Diagnostic data available to date is as below and was reviewed on current visit:  11/5/19 : Overnight split PSG.  Diagnostic study from 10:27pm to 12:13am. Sleep efficiency 83.7% with 1.49 hrs of TST. Sleep distribution shows absence of REM sleep. There is high density of SWS at 41%. AHI index is 32.8 with RDI 36.2. During supine sleep AHI is 57.3 with RDI 61.4. Due to absence of REM sleep severity may be underestimated. O2 saturation fewll below 89% for 11.6% of TST. PLM index is extremely high at 93.9. PLM arousal index is 7.4. Snoring noted for 91.37% of TST.  Titration study from 12:13am to 5:15am. Sleep efficiency 90.4% with 4.54 hrs of TST. Sleep distribution shows a rebound in REM sleep to 18.3% of TST. AHI index improved with CPAP device. O2 saturation remained above 88%. PLM resolved with CPAP device. CPAP increased from 5 to 18 cwp. Maximum sleep at 13 cwp however slightly higher pressures re required to get AHI < 5. At pressures higher than 15 cms, the AHI index increases factitiously due to oral air leaks.    Impression:  1. Obstructive sleep apnea, adult    2.  Difficulty with CPAP use    3. Dry mouth          Plan:  Reduce CPAP pressures to 8-13 cm H2O to help improve leaks and improve CPAP tolerance.    I advised Moriah to adjust level of humidification on CPAP to help improve dry mouth.    I reviewed original sleep study and recent download report with patient. She benefits from treatment. I advised her to increase compliance. Renew prescription for all CPAP accessories.    RTC 1 year    Thank you for allowing me to participate in your patient's care.      ASHA Damon  [facility]  Phone: [phone]      Part of this note may be an electronic transcription/translation of spoken language to printed text using the Dragon Dictation System.

## 2025-05-20 ENCOUNTER — TELEPHONE (OUTPATIENT)
Dept: SLEEP MEDICINE | Facility: HOSPITAL | Age: 65
End: 2025-05-20
Payer: COMMERCIAL

## (undated) DEVICE — BNDG ESMARK STRL 6INX12FT LF

## (undated) DEVICE — GLV SURG BIOGEL LTX PF 8

## (undated) DEVICE — DRAPE,REIN 53X77,STERILE: Brand: MEDLINE

## (undated) DEVICE — SCRW HEX PERSONA FML 2.5X25MM PK/2
Type: IMPLANTABLE DEVICE | Site: KNEE | Status: NON-FUNCTIONAL
Removed: 2019-10-11

## (undated) DEVICE — GLV SURG TRIUMPH CLASSIC PF LTX 8 STRL

## (undated) DEVICE — KT DRN EVAC WND PVC PCH WTROC RND 10F400

## (undated) DEVICE — SOL ISO/ALC RUB 70PCT 4OZ

## (undated) DEVICE — 3M™ STERI-STRIP™ REINFORCED ADHESIVE SKIN CLOSURES, R1547, 1/2 IN X 4 IN (12 MM X 100 MM), 6 STRIPS/ENVELOPE: Brand: 3M™ STERI-STRIP™

## (undated) DEVICE — SYR CONTRL LUERLOK 10CC

## (undated) DEVICE — Device

## (undated) DEVICE — ZIPPERED TOGA, 2X LARGE: Brand: FLYTE, SURGICOOL

## (undated) DEVICE — Device: Brand: ACCUMIX® MIXING SYSTEM

## (undated) DEVICE — DRSNG WND GEL FIBR OPTICELL AG PLS W/SLV LF 4X5IN  STRL

## (undated) DEVICE — DUAL CUT SAGITTAL BLADE

## (undated) DEVICE — UNDERCAST PADDING: Brand: DEROYAL

## (undated) DEVICE — NDL SPINE 22G 31/2IN BLK

## (undated) DEVICE — DRP C/ARM 41X74IN

## (undated) DEVICE — KT KN SCP W/ACCUPORT DS

## (undated) DEVICE — CONTAINER,SPECIMEN,OR STERILE,4OZ: Brand: MEDLINE

## (undated) DEVICE — TBG ARTHSCP PUMP W CONN/REDUC 8FT

## (undated) DEVICE — DISPOSABLE TOURNIQUET CUFF SINGLE BLADDER, SINGLE PORT AND QUICK CONNECT CONNECTOR: Brand: COLOR CUFF

## (undated) DEVICE — DRSNG GZ PETROLTM XEROFORM CURAD 1X8IN STRL

## (undated) DEVICE — SUT ETHIB 0 CT1 CR8 18IN CX21D

## (undated) DEVICE — SKIN PREP TRAY W/CHG: Brand: MEDLINE INDUSTRIES, INC.

## (undated) DEVICE — 4.5 MM FULL RADIUS STRAIGHT                                    BLADES, POWER/EP-1, YELLOW, PACKAGED                                    6 PER BOX, STERILE: Brand: DYONICS

## (undated) DEVICE — PK KN TOTL 40

## (undated) DEVICE — GLV SURG PREMIERPRO ORTHO LTX PF SZ8 BRN

## (undated) DEVICE — PIN DRL NOHEAD TROC 3.2X75MM BX/4

## (undated) DEVICE — TBG ARTHSCP PT W CONN/REDUC 8FT

## (undated) DEVICE — OPTIFOAM GENTLE SA, POSTOP, 4X12: Brand: MEDLINE

## (undated) DEVICE — APPL CHLORAPREP W/TINT 26ML ORNG

## (undated) DEVICE — ANTIBACTERIAL UNDYED BRAIDED (POLYGLACTIN 910), SYNTHETIC ABSORBABLE SUTURE: Brand: COATED VICRYL

## (undated) DEVICE — BNDG ELAS ELITE V/CLOSE 6IN 5YD LF STRL

## (undated) DEVICE — PK ARTHSCP 40

## (undated) DEVICE — SUT ETHLN 3/0 PC5 18IN 1893G